# Patient Record
Sex: FEMALE | Race: WHITE | Employment: FULL TIME | URBAN - NONMETROPOLITAN AREA
[De-identification: names, ages, dates, MRNs, and addresses within clinical notes are randomized per-mention and may not be internally consistent; named-entity substitution may affect disease eponyms.]

---

## 2017-06-08 ENCOUNTER — TELEPHONE (OUTPATIENT)
Dept: INTERNAL MEDICINE | Age: 57
End: 2017-06-08

## 2017-06-08 DIAGNOSIS — Z12.31 SCREENING MAMMOGRAM, ENCOUNTER FOR: Primary | ICD-10-CM

## 2017-07-07 ENCOUNTER — HOSPITAL ENCOUNTER (OUTPATIENT)
Dept: WOMENS IMAGING | Age: 57
Discharge: HOME | End: 2017-07-07
Payer: COMMERCIAL

## 2017-07-07 DIAGNOSIS — Z12.31 SCREENING MAMMOGRAM, ENCOUNTER FOR: ICD-10-CM

## 2017-07-07 PROCEDURE — 77063 BREAST TOMOSYNTHESIS BI: CPT

## 2017-07-19 ENCOUNTER — HOSPITAL ENCOUNTER (OUTPATIENT)
Dept: WOMENS IMAGING | Age: 57
Discharge: HOME | End: 2017-07-19
Payer: COMMERCIAL

## 2017-07-19 DIAGNOSIS — R92.2 BREAST DENSITY: ICD-10-CM

## 2017-07-19 DIAGNOSIS — N64.89 BREAST ASYMMETRY: ICD-10-CM

## 2017-07-19 PROCEDURE — G0279 TOMOSYNTHESIS, MAMMO: HCPCS

## 2017-07-19 PROCEDURE — 76642 ULTRASOUND BREAST LIMITED: CPT

## 2017-07-20 ENCOUNTER — TELEPHONE (OUTPATIENT)
Dept: INTERNAL MEDICINE | Age: 57
End: 2017-07-20

## 2017-07-24 ENCOUNTER — TELEPHONE (OUTPATIENT)
Dept: INTERNAL MEDICINE | Age: 57
End: 2017-07-24

## 2017-07-24 DIAGNOSIS — R92.8 ABNORMAL MAMMOGRAM OF LEFT BREAST: Primary | ICD-10-CM

## 2017-07-25 ENCOUNTER — TELEPHONE (OUTPATIENT)
Dept: WOMENS IMAGING | Age: 57
End: 2017-07-25

## 2017-07-26 ENCOUNTER — OFFICE VISIT (OUTPATIENT)
Dept: SURGERY | Age: 57
End: 2017-07-26
Payer: COMMERCIAL

## 2017-07-26 VITALS
WEIGHT: 149 LBS | HEART RATE: 80 BPM | DIASTOLIC BLOOD PRESSURE: 72 MMHG | HEIGHT: 63 IN | SYSTOLIC BLOOD PRESSURE: 118 MMHG | BODY MASS INDEX: 26.4 KG/M2 | RESPIRATION RATE: 16 BRPM

## 2017-07-26 DIAGNOSIS — N63.0 BREAST MASS: Primary | ICD-10-CM

## 2017-07-26 PROCEDURE — 99202 OFFICE O/P NEW SF 15 MIN: CPT | Performed by: PHYSICIAN ASSISTANT

## 2017-07-26 RX ORDER — METFORMIN HYDROCHLORIDE 750 MG/1
750 TABLET, EXTENDED RELEASE ORAL
Refills: 2 | COMMUNITY
Start: 2017-05-04 | End: 2017-09-11 | Stop reason: SDUPTHER

## 2017-07-26 RX ORDER — ATORVASTATIN CALCIUM 40 MG/1
40 TABLET, FILM COATED ORAL DAILY
Refills: 2 | COMMUNITY
Start: 2017-05-04 | End: 2017-09-11 | Stop reason: SDUPTHER

## 2017-07-26 RX ORDER — IRBESARTAN 75 MG/1
TABLET ORAL DAILY
Refills: 2 | COMMUNITY
Start: 2017-05-04 | End: 2017-09-11 | Stop reason: SDUPTHER

## 2017-08-01 ENCOUNTER — HOSPITAL ENCOUNTER (OUTPATIENT)
Dept: WOMENS IMAGING | Age: 57
Discharge: HOME | End: 2017-08-01
Payer: COMMERCIAL

## 2017-08-01 DIAGNOSIS — N63.0 BREAST MASS: ICD-10-CM

## 2017-08-01 PROCEDURE — 19083 BX BREAST 1ST LESION US IMAG: CPT | Performed by: SURGERY

## 2017-08-01 PROCEDURE — G0206 DX MAMMO INCL CAD UNI: HCPCS

## 2017-08-01 PROCEDURE — 88305 TISSUE EXAM BY PATHOLOGIST: CPT

## 2017-08-01 PROCEDURE — 19083 BX BREAST 1ST LESION US IMAG: CPT

## 2017-08-04 ENCOUNTER — TELEPHONE (OUTPATIENT)
Dept: SURGERY | Age: 57
End: 2017-08-04

## 2017-08-08 ENCOUNTER — OFFICE VISIT (OUTPATIENT)
Dept: OBSTETRICS AND GYNECOLOGY | Facility: CLINIC | Age: 57
End: 2017-08-08

## 2017-08-08 VITALS
HEIGHT: 63 IN | SYSTOLIC BLOOD PRESSURE: 118 MMHG | BODY MASS INDEX: 26.05 KG/M2 | WEIGHT: 147 LBS | DIASTOLIC BLOOD PRESSURE: 82 MMHG

## 2017-08-08 DIAGNOSIS — Z01.419 WELL WOMAN EXAM WITH ROUTINE GYNECOLOGICAL EXAM: Primary | ICD-10-CM

## 2017-08-08 DIAGNOSIS — K42.9 UMBILICAL HERNIA WITHOUT OBSTRUCTION AND WITHOUT GANGRENE: ICD-10-CM

## 2017-08-08 PROCEDURE — G0123 SCREEN CERV/VAG THIN LAYER: HCPCS | Performed by: NURSE PRACTITIONER

## 2017-08-08 PROCEDURE — 99396 PREV VISIT EST AGE 40-64: CPT | Performed by: NURSE PRACTITIONER

## 2017-08-08 RX ORDER — METFORMIN HYDROCHLORIDE 750 MG/1
TABLET, EXTENDED RELEASE ORAL
Refills: 2 | COMMUNITY
Start: 2017-05-04

## 2017-08-08 RX ORDER — ATORVASTATIN CALCIUM 40 MG/1
TABLET, FILM COATED ORAL
Refills: 2 | COMMUNITY
Start: 2017-05-04

## 2017-08-08 RX ORDER — LIRAGLUTIDE 6 MG/ML
INJECTION SUBCUTANEOUS
Refills: 2 | COMMUNITY
Start: 2017-05-04

## 2017-08-08 RX ORDER — IRBESARTAN 75 MG/1
TABLET ORAL
Refills: 2 | COMMUNITY
Start: 2017-05-04

## 2017-08-08 NOTE — PROGRESS NOTES
Subjective   Carolina Pérez is a 57 y.o. female  YOB: 1960    Est PT is here today for yearly visit.  Pt states that she is doing good with no c/o  Pt does need order for Mammo today as well      Chief Complaint   Patient presents with   • Gynecologic Exam     Here for annual exam, pap smear.  LPS 16 WNL. Just had breast biopsy., negative. ([facility]).        HPI    The following portions of the patient's history were reviewed and updated as appropriate: allergies, current medications, past family history, past medical history, past social history, past surgical history and problem list.    No Known Allergies    Past Medical History:   Diagnosis Date   • Abnormal Pap smear of cervix    • Diabetes     Type 2   • Hyperlipidemia        Family History   Problem Relation Age of Onset   • No Known Problems Brother    • No Known Problems Sister    • Breast cancer Neg Hx    • Ovarian cancer Neg Hx    • Colon cancer Neg Hx        Social History     Social History   • Marital status:      Spouse name: N/A   • Number of children: N/A   • Years of education: N/A     Occupational History   • Not on file.     Social History Main Topics   • Smoking status: Never Smoker   • Smokeless tobacco: Never Used   • Alcohol use Yes      Comment: occ   • Drug use: No   • Sexual activity: Not on file     Other Topics Concern   • Not on file     Social History Narrative   • No narrative on file         Current Outpatient Prescriptions:   •  atorvastatin (LIPITOR) 40 MG tablet, , Disp: , Rfl: 2  •  irbesartan (AVAPRO) 75 MG tablet, , Disp: , Rfl: 2  •  metFORMIN ER (GLUCOPHAGE-XR) 750 MG 24 hr tablet, , Disp: , Rfl: 2  •  VICTOZA 18 MG/3ML solution pen-injector, , Disp: , Rfl: 2    Menstrual History:  OB History      Para Term  AB TAB SAB Ectopic Multiple Living    3 0 0 0 0 0 0 0 0 3           No LMP recorded. Patient is postmenopausal.    Sexual History:         Could not be calculated    Past Surgical  History:   Procedure Laterality Date   • BREAST BIOPSY Left     negative   • CHOLECYSTECTOMY     • WISDOM TOOTH EXTRACTION         Review of Systems   Constitutional: Negative for activity change, appetite change, fatigue and fever.   HENT: Negative for ear pain, hearing loss, sore throat and trouble swallowing.    Eyes: Negative for pain, discharge and visual disturbance.   Respiratory: Negative for apnea, cough, chest tightness and shortness of breath.    Cardiovascular: Negative for chest pain and palpitations.   Gastrointestinal: Negative for abdominal distention, abdominal pain, blood in stool, constipation, diarrhea, nausea and vomiting.   Endocrine: Negative for heat intolerance, polydipsia and polyuria.   Genitourinary: Negative for difficulty urinating, dyspareunia, dysuria, frequency, menstrual problem, pelvic pain, urgency, vaginal bleeding, vaginal discharge and vaginal pain.   Musculoskeletal: Negative for arthralgias, back pain, joint swelling and myalgias.   Skin: Negative for rash and wound.   Allergic/Immunologic: Negative for environmental allergies and immunocompromised state.   Neurological: Negative for dizziness, tremors, seizures, numbness and headaches.   Hematological: Negative for adenopathy. Does not bruise/bleed easily.   Psychiatric/Behavioral: Negative for agitation, confusion and sleep disturbance. The patient is not nervous/anxious.        Objective   Physical Exam   Constitutional: She is oriented to person, place, and time. She appears well-developed and well-nourished. No distress.   HENT:   Head: Normocephalic.   Right Ear: External ear normal.   Left Ear: External ear normal.   Eyes: Conjunctivae are normal. Right eye exhibits no discharge. Left eye exhibits no discharge. No scleral icterus.   Neck: Normal range of motion. Neck supple. Carotid bruit is not present. No tracheal deviation present. No thyromegaly present.   Cardiovascular: Normal rate, regular rhythm, normal heart  sounds and intact distal pulses.    No murmur heard.  Pulmonary/Chest: Effort normal and breath sounds normal. No respiratory distress. She has no wheezes. Right breast exhibits no inverted nipple, no mass, no nipple discharge, no skin change and no tenderness. Left breast exhibits no inverted nipple, no mass, no nipple discharge, no skin change and no tenderness. Breasts are symmetrical. There is no breast swelling.       Site of recent biopsy   Abdominal: Soft. She exhibits no distension and no mass. There is no tenderness. There is no guarding. No hernia. Hernia confirmed negative in the right inguinal area and confirmed negative in the left inguinal area.   Genitourinary: Rectum normal, vagina normal and uterus normal. Rectal exam shows no mass. No breast tenderness, discharge or bleeding. Pelvic exam was performed with patient supine. There is no rash, tenderness, lesion or injury on the right labia. There is no rash, tenderness, lesion or injury on the left labia. Uterus is not enlarged, not fixed and not tender. Cervix exhibits no motion tenderness, no discharge and no friability. Right adnexum displays no mass, no tenderness and no fullness. Left adnexum displays no mass, no tenderness and no fullness. No erythema, tenderness or bleeding in the vagina. No foreign body in the vagina. No signs of injury around the vagina. No vaginal discharge found.   Genitourinary Comments:   BSU normal  Urethral meatus  Normal  Perineum  Normal   Musculoskeletal: Normal range of motion. She exhibits no edema or tenderness.   Lymphadenopathy:        Head (right side): No submental, no submandibular, no tonsillar, no preauricular, no posterior auricular and no occipital adenopathy present.        Head (left side): No submental, no submandibular, no tonsillar, no preauricular, no posterior auricular and no occipital adenopathy present.     She has no cervical adenopathy.        Right cervical: No superficial cervical, no deep  "cervical and no posterior cervical adenopathy present.       Left cervical: No superficial cervical, no deep cervical and no posterior cervical adenopathy present.     She has no axillary adenopathy.        Right: No inguinal adenopathy present.        Left: No inguinal adenopathy present.   Neurological: She is alert and oriented to person, place, and time. Coordination normal.   Skin: Skin is warm and dry. No bruising and no rash noted. She is not diaphoretic. No erythema.   Psychiatric: She has a normal mood and affect. Her behavior is normal. Judgment and thought content normal.   Nursing note and vitals reviewed.        Vitals:    08/08/17 1001   BP: 118/82   BP Location: Left arm   Patient Position: Sitting   Cuff Size: Adult   Weight: 147 lb (66.7 kg)   Height: 63\" (160 cm)       Carolina was seen today for gynecologic exam.    Diagnoses and all orders for this visit:    Well woman exam with routine gynecological exam  Comments:  Normal well woman exam.  Thin prep done.    Orders:  -     Liquid-based Pap Smear, Screening    Umbilical hernia without obstruction and without gangrene  Comments:  Patient has an umbilical hernia that she has never had evaluated.  Referral made to gastroenterology.   Orders:  -     Ambulatory Referral to Gastroenterology        Body mass index is 26.04 kg/(m^2).     Non-Smoker    MyChart Instructions Given       "

## 2017-08-09 LAB
GEN CATEG CVX/VAG CYTO-IMP: NORMAL
LAB AP CASE REPORT: NORMAL
LAB AP GYN ADDITIONAL INFORMATION: NORMAL
Lab: NORMAL
PATH INTERP SPEC-IMP: NORMAL
STAT OF ADQ CVX/VAG CYTO-IMP: NORMAL

## 2017-08-14 ENCOUNTER — OFFICE VISIT (OUTPATIENT)
Dept: SURGERY | Age: 57
End: 2017-08-14
Payer: COMMERCIAL

## 2017-08-14 VITALS — SYSTOLIC BLOOD PRESSURE: 120 MMHG | DIASTOLIC BLOOD PRESSURE: 60 MMHG | HEART RATE: 80 BPM

## 2017-08-14 DIAGNOSIS — N60.12 FIBROCYSTIC CHANGES OF LEFT BREAST: Primary | ICD-10-CM

## 2017-08-14 PROCEDURE — 99212 OFFICE O/P EST SF 10 MIN: CPT | Performed by: PHYSICIAN ASSISTANT

## 2017-08-23 ENCOUNTER — OFFICE VISIT (OUTPATIENT)
Dept: GASTROENTEROLOGY | Facility: CLINIC | Age: 57
End: 2017-08-23

## 2017-08-23 VITALS
WEIGHT: 142 LBS | BODY MASS INDEX: 25.16 KG/M2 | HEART RATE: 76 BPM | TEMPERATURE: 98.2 F | SYSTOLIC BLOOD PRESSURE: 120 MMHG | DIASTOLIC BLOOD PRESSURE: 80 MMHG | HEIGHT: 63 IN

## 2017-08-23 DIAGNOSIS — R19.7 DIARRHEA, UNSPECIFIED TYPE: Primary | ICD-10-CM

## 2017-08-23 DIAGNOSIS — R10.30 LOWER ABDOMINAL PAIN: ICD-10-CM

## 2017-08-23 PROCEDURE — 99213 OFFICE O/P EST LOW 20 MIN: CPT | Performed by: NURSE PRACTITIONER

## 2017-08-23 RX ORDER — CHLORAL HYDRATE 500 MG
CAPSULE ORAL
COMMUNITY

## 2017-08-23 RX ORDER — PHENOL 1.4 %
600 AEROSOL, SPRAY (ML) MUCOUS MEMBRANE DAILY
COMMUNITY

## 2017-08-23 RX ORDER — MONTELUKAST SODIUM 4 MG/1
1 TABLET, CHEWABLE ORAL 3 TIMES DAILY
Qty: 90 TABLET | Refills: 1 | Status: SHIPPED | OUTPATIENT
Start: 2017-08-23 | End: 2017-09-05 | Stop reason: SDUPTHER

## 2017-08-23 NOTE — PROGRESS NOTES
"Chief Complaint   Patient presents with   • Diarrhea     had diarrhea bad last week not been eating anything much was told she has a hernia per dr. becker       Subjective     HPI    Pt states she was referred to our office by PCP to have \"hernia evaluated.\"  Pt currently denies heartburn or indigestion.  No dysphagia or odynophagia.  She has experienced sudden onset of diarrhea over past 2 weeks.  Stools alternate between watery and loose.  Diarrhea occurs whether or not she eats.  She has woke up a few times during night to pass stool.  Associated lower abdominal cramping.  Cramping is relieved after BM.  Cramping only occurs with diarrhea.  Denies use of NSAIDs.  No N/V.      CScope (Dr Chavez) 8/2016-normal (hx of tubular adenoma polyp in 2013)    Past Medical History:   Diagnosis Date   • Abnormal Pap smear of cervix    • Diabetes     Type 2   • Hyperlipidemia        Past Surgical History:   Procedure Laterality Date   • BREAST BIOPSY Left     negative   • CHOLECYSTECTOMY     • WISDOM TOOTH EXTRACTION         Outpatient Prescriptions Marked as Taking for the 8/23/17 encounter (Office Visit) with NOREEN Shabazz   Medication Sig Dispense Refill   • atorvastatin (LIPITOR) 40 MG tablet   2   • calcium carbonate (OS-NAHED) 600 MG tablet Take 600 mg by mouth Daily.     • Flaxseed, Linseed, (FLAX SEEDS PO) Take  by mouth.     • irbesartan (AVAPRO) 75 MG tablet   2   • metFORMIN ER (GLUCOPHAGE-XR) 750 MG 24 hr tablet   2   • Omega-3 Fatty Acids (FISH OIL) 1000 MG capsule capsule Take  by mouth Daily With Breakfast.     • VICTOZA 18 MG/3ML solution pen-injector   2       No Known Allergies    Social History     Social History   • Marital status:      Spouse name: N/A   • Number of children: N/A   • Years of education: N/A     Occupational History   • Not on file.     Social History Main Topics   • Smoking status: Never Smoker   • Smokeless tobacco: Never Used   • Alcohol use Yes      Comment: occ   • Drug " "use: No   • Sexual activity: Not on file     Other Topics Concern   • Not on file     Social History Narrative       Family History   Problem Relation Age of Onset   • Prostate cancer Father    • No Known Problems Brother    • No Known Problems Sister    • Breast cancer Neg Hx    • Ovarian cancer Neg Hx    • Colon cancer Neg Hx        Review of Systems   Constitutional: Negative for fatigue, fever and unexpected weight change.   HENT: Negative for hearing loss, sore throat and voice change.    Eyes: Negative for visual disturbance.   Respiratory: Negative for cough, shortness of breath and wheezing.    Cardiovascular: Negative for chest pain and palpitations.   Gastrointestinal: Negative for abdominal pain, blood in stool and vomiting.   Endocrine: Negative for polydipsia and polyuria.   Genitourinary: Negative for difficulty urinating, dysuria, hematuria and urgency.   Musculoskeletal: Negative for joint swelling and myalgias.   Skin: Negative for color change, rash and wound.   Neurological: Negative for dizziness, tremors, seizures and syncope.   Hematological: Does not bruise/bleed easily.   Psychiatric/Behavioral: Negative for agitation and confusion. The patient is not nervous/anxious.        Objective     Vitals:    08/23/17 0924   BP: 120/80   Pulse: 76   Temp: 98.2 °F (36.8 °C)   Weight: 142 lb (64.4 kg)   Height: 63\" (160 cm)     Body mass index is 25.15 kg/(m^2).    Physical Exam   Constitutional: She is oriented to person, place, and time. She appears well-developed and well-nourished.   HENT:   Head: Normocephalic and atraumatic.   Eyes: Conjunctivae are normal. Pupils are equal, round, and reactive to light. No scleral icterus.   Neck: No JVD present. No thyroid mass and no thyromegaly present.   Cardiovascular: Normal rate, regular rhythm and normal heart sounds.  Exam reveals no gallop and no friction rub.    No murmur heard.  Pulmonary/Chest: Effort normal and breath sounds normal. No accessory " muscle usage. No respiratory distress. She has no wheezes. She has no rales.   Abdominal: Soft. Bowel sounds are normal. She exhibits no distension, no ascites and no mass. There is no splenomegaly or hepatomegaly. There is no tenderness. There is no rebound and no guarding.   Genitourinary:   Genitourinary Comments: Rectal-Did not examine   Musculoskeletal: Normal range of motion. She exhibits no edema.   Neurological: She is alert and oriented to person, place, and time.   Deemed a reliable historian, able to converse without difficulty and able to move all extremities without difficulty   Skin: Skin is warm and dry.   Psychiatric: She has a normal mood and affect. Her behavior is normal.       Imaging Results (most recent)     None          Assessment/Plan     Carolina was seen today for diarrhea.    Diagnoses and all orders for this visit:    Diarrhea, unspecified type  -     Gastrointestinal Panel, PCR  -     Occult Blood X 1, Stool  -     colestipol (COLESTID) 1 g tablet; Take 1 tablet by mouth 3 (Three) Times a Day.    Lower abdominal pain    I will request records from PCP as pt is not currently exhibiting symptoms to warrant EGD.  I will discuss case further with Dr Chavez.  Instructed pt to take colestid 2x daily, 30 min prior to meal.  She may increase to three pills per day if sx persist.  * Surgery not found *    There are no Patient Instructions on file for this visit.

## 2017-08-24 ENCOUNTER — APPOINTMENT (OUTPATIENT)
Dept: LAB | Facility: HOSPITAL | Age: 57
End: 2017-08-24

## 2017-08-24 LAB — HEMOCCULT STL QL: NEGATIVE

## 2017-08-24 PROCEDURE — 82272 OCCULT BLD FECES 1-3 TESTS: CPT | Performed by: NURSE PRACTITIONER

## 2017-08-28 ENCOUNTER — TELEPHONE (OUTPATIENT)
Dept: GASTROENTEROLOGY | Facility: CLINIC | Age: 57
End: 2017-08-28

## 2017-08-28 PROCEDURE — 87077 CULTURE AEROBIC IDENTIFY: CPT | Performed by: NURSE PRACTITIONER

## 2017-08-28 PROCEDURE — 87081 CULTURE SCREEN ONLY: CPT | Performed by: NURSE PRACTITIONER

## 2017-08-28 PROCEDURE — 87507 IADNA-DNA/RNA PROBE TQ 12-25: CPT | Performed by: NURSE PRACTITIONER

## 2017-08-28 NOTE — TELEPHONE ENCOUNTER
"Please let her know stool was neg for blood, GI panel is still pending as she has not brought in sample for that.      I spoke to lab and they were not able to run both test off same sample due to \"solution\" added to GI panel container.  I still have not received records from her PCP.      At this time I do not have a reason to perform an EGD.  If her diarrhea persist and GI panel is neg a colonoscopy could be considered.  I did review case with Dr Chavez and he was in agreement.    ty  "

## 2017-08-29 ENCOUNTER — TELEPHONE (OUTPATIENT)
Dept: GASTROENTEROLOGY | Facility: CLINIC | Age: 57
End: 2017-08-29

## 2017-08-29 ENCOUNTER — APPOINTMENT (OUTPATIENT)
Dept: LAB | Facility: HOSPITAL | Age: 57
End: 2017-08-29

## 2017-08-29 ENCOUNTER — TRANSCRIBE ORDERS (OUTPATIENT)
Dept: ADMINISTRATIVE | Facility: HOSPITAL | Age: 57
End: 2017-08-29

## 2017-08-29 DIAGNOSIS — R19.7 DIARRHEA, UNSPECIFIED TYPE: Primary | ICD-10-CM

## 2017-08-29 LAB
ADV 40+41 DNA STL QL NAA+NON-PROBE: NOT DETECTED
ASTRO TYP 1-8 RNA STL QL NAA+NON-PROBE: NOT DETECTED
C CAYETANENSIS DNA STL QL NAA+NON-PROBE: NOT DETECTED
C DIFF TOX GENS STL QL NAA+PROBE: NOT DETECTED
CAMPY SP DNA.DIARRHEA STL QL NAA+PROBE: NOT DETECTED
CRYPTOSP STL CULT: NOT DETECTED
E COLI DNA SPEC QL NAA+PROBE: NOT DETECTED
E HISTOLYT AG STL-ACNC: NOT DETECTED
EAEC PAA PLAS AGGR+AATA ST NAA+NON-PRB: NOT DETECTED
EC STX1+STX2 GENES STL QL NAA+NON-PROBE: NOT DETECTED
EPEC EAE GENE STL QL NAA+NON-PROBE: NOT DETECTED
ETEC LTA+ST1A+ST1B TOX ST NAA+NON-PROBE: NOT DETECTED
G LAMBLIA DNA SPEC QL NAA+PROBE: NOT DETECTED
NOROVIRUS GI+II RNA STL QL NAA+NON-PROBE: NOT DETECTED
P SHIGELLOIDES DNA STL QL NAA+NON-PROBE: NOT DETECTED
RV RNA STL NAA+PROBE: NOT DETECTED
SALMONELLA DNA SPEC QL NAA+PROBE: DETECTED
SAPO I+II+IV+V RNA STL QL NAA+NON-PROBE: NOT DETECTED
SHIGELLA SP+EIEC IPAH ST NAA+NON-PROBE: NOT DETECTED
V CHOLERAE DNA SPEC QL NAA+PROBE: NOT DETECTED
VIBRIO DNA SPEC NAA+PROBE: NOT DETECTED
YERSINIA STL CULT: NOT DETECTED

## 2017-08-29 RX ORDER — CIPROFLOXACIN 500 MG/1
500 TABLET, FILM COATED ORAL 2 TIMES DAILY
Qty: 20 TABLET | Refills: 0 | Status: SHIPPED | OUTPATIENT
Start: 2017-08-29 | End: 2017-08-29 | Stop reason: SDUPTHER

## 2017-08-29 RX ORDER — CIPROFLOXACIN 500 MG/1
500 TABLET, FILM COATED ORAL 2 TIMES DAILY
Qty: 14 TABLET | Refills: 0 | Status: SHIPPED | OUTPATIENT
Start: 2017-08-29 | End: 2017-09-05

## 2017-08-29 NOTE — TELEPHONE ENCOUNTER
Cee from [facility] called and stated that they have the results of the gastrointestinal panel and this shoed she had salmonella

## 2017-08-29 NOTE — TELEPHONE ENCOUNTER
Pt notified of results.    She continues to experience at least 5 episodes of diarrhea daily.  No blood or melena stools  Antibiotics called to pharmacy,   Reviewed results/medications with dr plummer

## 2017-09-05 ENCOUNTER — TELEPHONE (OUTPATIENT)
Dept: GASTROENTEROLOGY | Facility: CLINIC | Age: 57
End: 2017-09-05

## 2017-09-05 DIAGNOSIS — R19.7 DIARRHEA, UNSPECIFIED TYPE: ICD-10-CM

## 2017-09-05 RX ORDER — MONTELUKAST SODIUM 4 MG/1
1 TABLET, CHEWABLE ORAL 3 TIMES DAILY
Qty: 90 TABLET | Refills: 1 | Status: SHIPPED | OUTPATIENT
Start: 2017-09-05 | End: 2018-08-09

## 2017-09-05 NOTE — TELEPHONE ENCOUNTER
Pt called and stated that she has finished her last ciprofloxacin and she does not feel any better she stated she id still having diarrhea 5 to 6 times a day and at night she can not control it that she is going in her sleep she also stated that her  has the same antibiotic and if you wanted her to take more of the same medication that she did not need anything called in but she stated this med did not seem to work

## 2017-09-05 NOTE — TELEPHONE ENCOUNTER
Spoke with pt  She will take Colestid and call in one week if not improved  She has been on lactose free diet

## 2017-09-05 NOTE — TELEPHONE ENCOUNTER
Do I need to schedule for Cscope (neg procedure 2016)  GI panel pos for salmonella    Further direction, please    ad

## 2017-09-06 RX ORDER — ASCORBIC ACID 500 MG
500 TABLET ORAL DAILY
COMMUNITY

## 2017-09-06 RX ORDER — PERPHENAZINE/AMITRIPTYLINE HCL 4MG-10MG
TABLET ORAL DAILY
COMMUNITY
End: 2017-09-11

## 2017-09-06 RX ORDER — FOLIC ACID 1 MG/1
1 TABLET ORAL DAILY
COMMUNITY

## 2017-09-06 RX ORDER — CHLORAL HYDRATE 500 MG
3000 CAPSULE ORAL DAILY
COMMUNITY
End: 2020-01-08

## 2017-09-06 RX ORDER — ECHINACEA 400 MG
CAPSULE ORAL
COMMUNITY
End: 2017-09-11

## 2017-09-08 DIAGNOSIS — E11.9 TYPE 2 DIABETES MELLITUS WITHOUT COMPLICATION, UNSPECIFIED LONG TERM INSULIN USE STATUS: ICD-10-CM

## 2017-09-08 DIAGNOSIS — E78.2 MIXED HYPERLIPIDEMIA: ICD-10-CM

## 2017-09-08 DIAGNOSIS — I10 ESSENTIAL HYPERTENSION: Primary | ICD-10-CM

## 2017-09-11 ENCOUNTER — OFFICE VISIT (OUTPATIENT)
Dept: INTERNAL MEDICINE | Age: 57
End: 2017-09-11
Payer: COMMERCIAL

## 2017-09-11 VITALS
BODY MASS INDEX: 25.52 KG/M2 | OXYGEN SATURATION: 98 % | SYSTOLIC BLOOD PRESSURE: 118 MMHG | HEIGHT: 63 IN | WEIGHT: 144 LBS | DIASTOLIC BLOOD PRESSURE: 64 MMHG | HEART RATE: 86 BPM

## 2017-09-11 DIAGNOSIS — K43.9 VENTRAL HERNIA WITHOUT OBSTRUCTION OR GANGRENE: ICD-10-CM

## 2017-09-11 DIAGNOSIS — E11.9 TYPE 2 DIABETES MELLITUS WITHOUT COMPLICATION, WITHOUT LONG-TERM CURRENT USE OF INSULIN (HCC): ICD-10-CM

## 2017-09-11 DIAGNOSIS — E78.2 MIXED HYPERLIPIDEMIA: ICD-10-CM

## 2017-09-11 DIAGNOSIS — E11.9 TYPE 2 DIABETES MELLITUS WITHOUT COMPLICATION, UNSPECIFIED LONG TERM INSULIN USE STATUS: ICD-10-CM

## 2017-09-11 DIAGNOSIS — I10 ESSENTIAL HYPERTENSION: ICD-10-CM

## 2017-09-11 DIAGNOSIS — I10 ESSENTIAL HYPERTENSION: Primary | ICD-10-CM

## 2017-09-11 LAB
ALBUMIN SERPL-MCNC: 4.6 G/DL (ref 3.5–5.2)
ALP BLD-CCNC: 97 U/L (ref 35–104)
ALT SERPL-CCNC: 39 U/L (ref 5–33)
ANION GAP SERPL CALCULATED.3IONS-SCNC: 17 MMOL/L (ref 7–19)
AST SERPL-CCNC: 26 U/L (ref 5–32)
BACTERIA SPEC AEROBE CULT: ABNORMAL
BILIRUB SERPL-MCNC: 1.2 MG/DL (ref 0.2–1.2)
BUN BLDV-MCNC: 9 MG/DL (ref 6–20)
CALCIUM SERPL-MCNC: 10.4 MG/DL (ref 8.6–10)
CHLORIDE BLD-SCNC: 102 MMOL/L (ref 98–111)
CO2: 27 MMOL/L (ref 22–29)
CREAT SERPL-MCNC: 0.4 MG/DL (ref 0.5–0.9)
GFR NON-AFRICAN AMERICAN: >60
GLUCOSE BLD-MCNC: 112 MG/DL (ref 74–109)
HBA1C MFR BLD: 6.2 %
LDL CHOLESTEROL DIRECT: 64 MG/DL
POTASSIUM SERPL-SCNC: 4 MMOL/L (ref 3.5–5)
SODIUM BLD-SCNC: 146 MMOL/L (ref 136–145)
TOTAL PROTEIN: 7.9 G/DL (ref 6.6–8.7)

## 2017-09-11 PROCEDURE — 99214 OFFICE O/P EST MOD 30 MIN: CPT | Performed by: INTERNAL MEDICINE

## 2017-09-11 RX ORDER — METFORMIN HYDROCHLORIDE 750 MG/1
750 TABLET, EXTENDED RELEASE ORAL
Qty: 90 TABLET | Refills: 3 | Status: SHIPPED | OUTPATIENT
Start: 2017-09-11 | End: 2018-03-14 | Stop reason: SDUPTHER

## 2017-09-11 RX ORDER — IRBESARTAN 75 MG/1
TABLET ORAL
Qty: 45 TABLET | Refills: 3 | Status: SHIPPED | OUTPATIENT
Start: 2017-09-11 | End: 2017-09-13 | Stop reason: SDUPTHER

## 2017-09-11 RX ORDER — MONTELUKAST SODIUM 4 MG/1
1 TABLET, CHEWABLE ORAL 3 TIMES DAILY
Refills: 0 | COMMUNITY
Start: 2017-09-05 | End: 2018-03-14

## 2017-09-11 RX ORDER — ATORVASTATIN CALCIUM 40 MG/1
40 TABLET, FILM COATED ORAL DAILY
Qty: 90 TABLET | Refills: 3 | Status: SHIPPED | OUTPATIENT
Start: 2017-09-11 | End: 2018-03-14 | Stop reason: SDUPTHER

## 2017-09-11 ASSESSMENT — PATIENT HEALTH QUESTIONNAIRE - PHQ9
2. FEELING DOWN, DEPRESSED OR HOPELESS: 0
SUM OF ALL RESPONSES TO PHQ QUESTIONS 1-9: 0
SUM OF ALL RESPONSES TO PHQ9 QUESTIONS 1 & 2: 0
1. LITTLE INTEREST OR PLEASURE IN DOING THINGS: 0

## 2017-09-11 ASSESSMENT — ENCOUNTER SYMPTOMS
TROUBLE SWALLOWING: 0
SHORTNESS OF BREATH: 0
ABDOMINAL PAIN: 0
COUGH: 0
RHINORRHEA: 0
SORE THROAT: 0
NAUSEA: 0

## 2017-09-13 DIAGNOSIS — E78.2 MIXED HYPERLIPIDEMIA: ICD-10-CM

## 2017-09-13 DIAGNOSIS — K43.9 VENTRAL HERNIA WITHOUT OBSTRUCTION OR GANGRENE: ICD-10-CM

## 2017-09-13 DIAGNOSIS — I10 ESSENTIAL HYPERTENSION: ICD-10-CM

## 2017-09-13 DIAGNOSIS — E11.9 TYPE 2 DIABETES MELLITUS WITHOUT COMPLICATION, WITHOUT LONG-TERM CURRENT USE OF INSULIN (HCC): ICD-10-CM

## 2017-09-13 RX ORDER — IRBESARTAN 75 MG/1
TABLET ORAL
Qty: 45 TABLET | Refills: 3 | Status: SHIPPED | OUTPATIENT
Start: 2017-09-13 | End: 2018-03-14 | Stop reason: SDUPTHER

## 2017-09-18 DIAGNOSIS — K43.9 VENTRAL HERNIA WITHOUT OBSTRUCTION OR GANGRENE: Primary | ICD-10-CM

## 2017-10-12 RX ORDER — PEN NEEDLE, DIABETIC 32GX 5/32"
NEEDLE, DISPOSABLE MISCELLANEOUS
Qty: 100 EACH | Refills: 5 | Status: SHIPPED | OUTPATIENT
Start: 2017-10-12 | End: 2019-02-13 | Stop reason: SDUPTHER

## 2017-10-13 ENCOUNTER — TELEPHONE (OUTPATIENT)
Dept: INTERNAL MEDICINE | Age: 57
End: 2017-10-13

## 2017-10-13 RX ORDER — AMOXICILLIN AND CLAVULANATE POTASSIUM 875; 125 MG/1; MG/1
1 TABLET, FILM COATED ORAL 2 TIMES DAILY
Qty: 14 TABLET | Refills: 0 | Status: SHIPPED | OUTPATIENT
Start: 2017-10-13 | End: 2017-10-20

## 2017-10-13 NOTE — TELEPHONE ENCOUNTER
Pt called saying they have a sore throat and coughing wants an antibiotic called in for them.  Please call at [phone]

## 2018-01-03 ENCOUNTER — TELEPHONE (OUTPATIENT)
Dept: SURGERY | Age: 58
End: 2018-01-03

## 2018-01-03 NOTE — TELEPHONE ENCOUNTER
Called patient to give her the appt information for her mammogram and follow up appt with Felisa Ospina. She was driving and asked if I would just give her the dates and then leave the appt information on her vm. When I gave her the dates she said she was working on that day so I gave her the number to call to reschedule appts.

## 2018-01-10 ENCOUNTER — HOSPITAL ENCOUNTER (OUTPATIENT)
Dept: WOMENS IMAGING | Age: 58
Discharge: HOME | End: 2018-01-10
Payer: COMMERCIAL

## 2018-01-10 DIAGNOSIS — N60.12 FIBROCYSTIC CHANGES OF LEFT BREAST: ICD-10-CM

## 2018-01-10 PROCEDURE — 77065 DX MAMMO INCL CAD UNI: CPT

## 2018-01-19 ENCOUNTER — TELEPHONE (OUTPATIENT)
Dept: SURGERY | Age: 58
End: 2018-01-19

## 2018-03-14 ENCOUNTER — OFFICE VISIT (OUTPATIENT)
Dept: INTERNAL MEDICINE | Age: 58
End: 2018-03-14
Payer: COMMERCIAL

## 2018-03-14 VITALS
SYSTOLIC BLOOD PRESSURE: 124 MMHG | DIASTOLIC BLOOD PRESSURE: 74 MMHG | OXYGEN SATURATION: 97 % | HEIGHT: 63 IN | WEIGHT: 146 LBS | BODY MASS INDEX: 25.87 KG/M2 | HEART RATE: 70 BPM

## 2018-03-14 DIAGNOSIS — I65.22 ATHEROSCLEROSIS OF LEFT CAROTID ARTERY: ICD-10-CM

## 2018-03-14 DIAGNOSIS — I10 ESSENTIAL HYPERTENSION: ICD-10-CM

## 2018-03-14 DIAGNOSIS — K43.9 VENTRAL HERNIA WITHOUT OBSTRUCTION OR GANGRENE: ICD-10-CM

## 2018-03-14 DIAGNOSIS — E78.2 MIXED HYPERLIPIDEMIA: ICD-10-CM

## 2018-03-14 DIAGNOSIS — Z98.890 HISTORY OF BREAST BIOPSY: ICD-10-CM

## 2018-03-14 DIAGNOSIS — E11.9 TYPE 2 DIABETES MELLITUS WITHOUT COMPLICATION, WITHOUT LONG-TERM CURRENT USE OF INSULIN (HCC): ICD-10-CM

## 2018-03-14 LAB
ALBUMIN SERPL-MCNC: 4.4 G/DL (ref 3.5–5.2)
ALP BLD-CCNC: 130 U/L (ref 35–104)
ALT SERPL-CCNC: 23 U/L (ref 5–33)
ANION GAP SERPL CALCULATED.3IONS-SCNC: 16 MMOL/L (ref 7–19)
AST SERPL-CCNC: 20 U/L (ref 5–32)
BILIRUB SERPL-MCNC: 0.9 MG/DL (ref 0.2–1.2)
BUN BLDV-MCNC: 11 MG/DL (ref 8–23)
CALCIUM SERPL-MCNC: 9.9 MG/DL (ref 8.8–10.2)
CHLORIDE BLD-SCNC: 100 MMOL/L (ref 98–111)
CHOLESTEROL, TOTAL: 158 MG/DL (ref 160–199)
CO2: 26 MMOL/L (ref 22–29)
CREAT SERPL-MCNC: <0.5 MG/DL (ref 0.5–0.9)
CREATININE URINE: 26.6 MG/DL (ref 4.2–622)
GFR NON-AFRICAN AMERICAN: >60
GLUCOSE BLD-MCNC: 142 MG/DL (ref 74–109)
HBA1C MFR BLD: 7.2 %
HCT VFR BLD CALC: 42.4 % (ref 37–47)
HDLC SERPL-MCNC: 49 MG/DL (ref 65–121)
HEMOGLOBIN: 14.1 G/DL (ref 12–16)
LDL CHOLESTEROL CALCULATED: 87 MG/DL
MCH RBC QN AUTO: 30.8 PG (ref 27–31)
MCHC RBC AUTO-ENTMCNC: 33.3 G/DL (ref 33–37)
MCV RBC AUTO: 92.6 FL (ref 81–99)
MICROALBUMIN UR-MCNC: <1.2 MG/DL (ref 0–19)
MICROALBUMIN/CREAT UR-RTO: NORMAL MG/G
PDW BLD-RTO: 12.7 % (ref 11.5–14.5)
PLATELET # BLD: 295 K/UL (ref 130–400)
PMV BLD AUTO: 9.6 FL (ref 9.4–12.3)
POTASSIUM SERPL-SCNC: 4.2 MMOL/L (ref 3.5–5)
RBC # BLD: 4.58 M/UL (ref 4.2–5.4)
SODIUM BLD-SCNC: 142 MMOL/L (ref 136–145)
TOTAL PROTEIN: 7.2 G/DL (ref 6.6–8.7)
TRIGL SERPL-MCNC: 112 MG/DL (ref 0–149)
TSH SERPL DL<=0.05 MIU/L-ACNC: 1.14 UIU/ML (ref 0.27–4.2)
WBC # BLD: 7.3 K/UL (ref 4.8–10.8)

## 2018-03-14 PROCEDURE — 99214 OFFICE O/P EST MOD 30 MIN: CPT | Performed by: INTERNAL MEDICINE

## 2018-03-14 RX ORDER — IRBESARTAN 75 MG/1
TABLET ORAL
Qty: 45 TABLET | Refills: 3 | Status: SHIPPED | OUTPATIENT
Start: 2018-03-14 | End: 2018-08-07 | Stop reason: SDUPTHER

## 2018-03-14 RX ORDER — ATORVASTATIN CALCIUM 40 MG/1
40 TABLET, FILM COATED ORAL DAILY
Qty: 90 TABLET | Refills: 3 | Status: SHIPPED | OUTPATIENT
Start: 2018-03-14 | End: 2018-08-07 | Stop reason: SDUPTHER

## 2018-03-14 RX ORDER — METFORMIN HYDROCHLORIDE 750 MG/1
750 TABLET, EXTENDED RELEASE ORAL
Qty: 90 TABLET | Refills: 3 | Status: SHIPPED | OUTPATIENT
Start: 2018-03-14 | End: 2018-08-07 | Stop reason: SDUPTHER

## 2018-03-14 ASSESSMENT — ENCOUNTER SYMPTOMS
NAUSEA: 0
COUGH: 0
SHORTNESS OF BREATH: 0
ABDOMINAL PAIN: 0
TROUBLE SWALLOWING: 0
RHINORRHEA: 0
SORE THROAT: 0

## 2018-03-14 NOTE — PROGRESS NOTES
[facility] Internal Medicine    Chief Complaint   Patient presents with    Other     Pt is here for 5 month follow up of HTN and DM and review of recent labs. Pt denies any c/o. HPI:Gel returns reassessment of several problems, including carotid artery disease, hypertension, osteopenia, type II diabetes. She is doing very well on a regimen of metformin and Victoza, tolerating this, with excellent glycemic control. Blood pressures in a good range, she has no current vaginal symptoms such as chest pain, numbness, unilateral symptoms. Her blood pressure is nicely controlled    Dr. fregoso in [facility] examines her eyes      Past Medical History:   Diagnosis Date    Abdominal hernia     Carotid artery plaque      <50% rt;  50-69% left    Essential hypertension     H/O abnormal cervical Papanicolaou smear     Mixed hyperlipidemia     Osteopenia     Simple goiter     Type 2 diabetes mellitus without complication (HCC)     Umbilical hernia       Family History   Problem Relation Age of Onset    Diabetes Father     Heart Disease Father     High Blood Pressure Father     Cancer Father      Pancreatic cancer-  at 80    Coronary Art Dis Father     Coronary Art Dis Mother       Social History     Social History    Marital status:      Spouse name: N/A    Number of children: N/A    Years of education: N/A     Occupational History    Not on file.      Social History Main Topics    Smoking status: Never Smoker    Smokeless tobacco: Never Used    Alcohol use No    Drug use: No    Sexual activity: Not on file     Other Topics Concern    Not on file     Social History Narrative    No narrative on file      No Known Allergies   Current Outpatient Prescriptions   Medication Sig Dispense Refill    irbesartan (AVAPRO) 75 MG tablet 1/2 tablet daily 45 tablet 3    atorvastatin (LIPITOR) 40 MG tablet Take 1 tablet by mouth daily 90 tablet 3    Liraglutide (VICTOZA) 18 MG/3ML [facility]

## 2018-06-22 ENCOUNTER — TELEPHONE (OUTPATIENT)
Dept: SURGERY | Age: 58
End: 2018-06-22

## 2018-07-18 ENCOUNTER — HOSPITAL ENCOUNTER (OUTPATIENT)
Dept: WOMENS IMAGING | Age: 58
Discharge: HOME | End: 2018-07-18
Payer: COMMERCIAL

## 2018-07-18 DIAGNOSIS — Z12.31 ENCOUNTER FOR SCREENING MAMMOGRAM FOR MALIGNANT NEOPLASM OF BREAST: ICD-10-CM

## 2018-07-18 PROCEDURE — 77063 BREAST TOMOSYNTHESIS BI: CPT

## 2018-07-20 ENCOUNTER — TELEPHONE (OUTPATIENT)
Dept: WOMENS IMAGING | Age: 58
End: 2018-07-20

## 2018-07-24 ENCOUNTER — HOSPITAL ENCOUNTER (OUTPATIENT)
Dept: WOMENS IMAGING | Age: 58
Discharge: HOME | End: 2018-07-24
Payer: COMMERCIAL

## 2018-07-24 DIAGNOSIS — N63.0 BREAST MASS: Primary | ICD-10-CM

## 2018-07-24 DIAGNOSIS — N64.89 BREAST ASYMMETRY: ICD-10-CM

## 2018-07-24 PROCEDURE — G0279 TOMOSYNTHESIS, MAMMO: HCPCS

## 2018-07-24 PROCEDURE — 76642 ULTRASOUND BREAST LIMITED: CPT

## 2018-08-01 ENCOUNTER — HOSPITAL ENCOUNTER (OUTPATIENT)
Dept: WOMENS IMAGING | Age: 58
Discharge: HOME | End: 2018-08-01
Payer: COMMERCIAL

## 2018-08-01 DIAGNOSIS — N63.0 BREAST MASS: ICD-10-CM

## 2018-08-01 PROCEDURE — 88342 IMHCHEM/IMCYTCHM 1ST ANTB: CPT

## 2018-08-01 PROCEDURE — 88305 TISSUE EXAM BY PATHOLOGIST: CPT

## 2018-08-01 PROCEDURE — 77065 DX MAMMO INCL CAD UNI: CPT

## 2018-08-01 PROCEDURE — 2709999900 US BREAST BIOPSY W LOC DEVICE 1ST LESION LEFT

## 2018-08-03 ENCOUNTER — TELEPHONE (OUTPATIENT)
Dept: SURGERY | Age: 58
End: 2018-08-03

## 2018-08-03 ENCOUNTER — LAB REQUISITION (OUTPATIENT)
Dept: LAB | Facility: HOSPITAL | Age: 58
End: 2018-08-03

## 2018-08-03 DIAGNOSIS — Z00.00 ROUTINE GENERAL MEDICAL EXAMINATION AT A HEALTH CARE FACILITY: ICD-10-CM

## 2018-08-03 DIAGNOSIS — C50.412 MALIGNANT NEOPLASM OF UPPER-OUTER QUADRANT OF LEFT FEMALE BREAST, UNSPECIFIED ESTROGEN RECEPTOR STATUS (HCC): Primary | ICD-10-CM

## 2018-08-03 PROCEDURE — 88342 IMHCHEM/IMCYTCHM 1ST ANTB: CPT

## 2018-08-03 NOTE — TELEPHONE ENCOUNTER
Spoke with pt. Let her know that report was not back but preliminary looked positive for cancer. I'll call her back on Monday to give her report.

## 2018-08-07 ENCOUNTER — OFFICE VISIT (OUTPATIENT)
Dept: INTERNAL MEDICINE | Age: 58
End: 2018-08-07
Payer: COMMERCIAL

## 2018-08-07 VITALS
WEIGHT: 144.5 LBS | DIASTOLIC BLOOD PRESSURE: 70 MMHG | HEART RATE: 84 BPM | BODY MASS INDEX: 25.6 KG/M2 | SYSTOLIC BLOOD PRESSURE: 132 MMHG | HEIGHT: 63 IN | OXYGEN SATURATION: 98 %

## 2018-08-07 DIAGNOSIS — Z00.00 ROUTINE GENERAL MEDICAL EXAMINATION AT A HEALTH CARE FACILITY: Primary | ICD-10-CM

## 2018-08-07 DIAGNOSIS — I65.22 ATHEROSCLEROSIS OF LEFT CAROTID ARTERY: ICD-10-CM

## 2018-08-07 DIAGNOSIS — E78.2 MIXED HYPERLIPIDEMIA: ICD-10-CM

## 2018-08-07 DIAGNOSIS — I10 ESSENTIAL HYPERTENSION: ICD-10-CM

## 2018-08-07 DIAGNOSIS — Z23 NEED FOR PROPHYLACTIC VACCINATION AND INOCULATION AGAINST VARICELLA: ICD-10-CM

## 2018-08-07 DIAGNOSIS — Z23 NEED FOR PROPHYLACTIC VACCINATION AGAINST STREPTOCOCCUS PNEUMONIAE (PNEUMOCOCCUS): ICD-10-CM

## 2018-08-07 DIAGNOSIS — K43.9 VENTRAL HERNIA WITHOUT OBSTRUCTION OR GANGRENE: ICD-10-CM

## 2018-08-07 DIAGNOSIS — Z23 NEED FOR PROPHYLACTIC VACCINATION AGAINST DIPHTHERIA-TETANUS-PERTUSSIS (DTP): ICD-10-CM

## 2018-08-07 DIAGNOSIS — Z98.890 HISTORY OF BREAST BIOPSY: ICD-10-CM

## 2018-08-07 DIAGNOSIS — E11.9 TYPE 2 DIABETES MELLITUS WITHOUT COMPLICATION, WITHOUT LONG-TERM CURRENT USE OF INSULIN (HCC): ICD-10-CM

## 2018-08-07 DIAGNOSIS — C50.412 MALIGNANT NEOPLASM OF UPPER-OUTER QUADRANT OF LEFT FEMALE BREAST, UNSPECIFIED ESTROGEN RECEPTOR STATUS (HCC): Primary | ICD-10-CM

## 2018-08-07 DIAGNOSIS — C50.412 CARCINOMA OF UPPER-OUTER QUADRANT OF LEFT FEMALE BREAST, UNSPECIFIED ESTROGEN RECEPTOR STATUS (HCC): ICD-10-CM

## 2018-08-07 LAB
ALBUMIN SERPL-MCNC: 4.4 G/DL (ref 3.5–5.2)
ALP BLD-CCNC: 119 U/L (ref 35–104)
ALT SERPL-CCNC: 41 U/L (ref 5–33)
ANION GAP SERPL CALCULATED.3IONS-SCNC: 14 MMOL/L (ref 7–19)
AST SERPL-CCNC: 30 U/L (ref 5–32)
BASOPHILS ABSOLUTE: 0 K/UL (ref 0–0.2)
BASOPHILS RELATIVE PERCENT: 0.7 % (ref 0–1)
BILIRUB SERPL-MCNC: 1.1 MG/DL (ref 0.2–1.2)
BUN BLDV-MCNC: 12 MG/DL (ref 8–23)
CALCIUM SERPL-MCNC: 10 MG/DL (ref 8.8–10.2)
CHLORIDE BLD-SCNC: 102 MMOL/L (ref 98–111)
CHOLESTEROL, TOTAL: 113 MG/DL (ref 160–199)
CO2: 25 MMOL/L (ref 22–29)
CREAT SERPL-MCNC: 0.5 MG/DL (ref 0.5–0.9)
CREATININE URINE: 107.5 MG/DL (ref 4.2–622)
EOSINOPHILS ABSOLUTE: 0.2 K/UL (ref 0–0.6)
EOSINOPHILS RELATIVE PERCENT: 2.7 % (ref 0–5)
GFR NON-AFRICAN AMERICAN: >60
GLUCOSE BLD-MCNC: 125 MG/DL (ref 74–109)
HBA1C MFR BLD: 6.8 % (ref 4–6)
HCT VFR BLD CALC: 39.8 % (ref 37–47)
HDLC SERPL-MCNC: 40 MG/DL (ref 65–121)
HEMOGLOBIN: 13.2 G/DL (ref 12–16)
LDL CHOLESTEROL CALCULATED: 54 MG/DL
LYMPHOCYTES ABSOLUTE: 2.6 K/UL (ref 1.1–4.5)
LYMPHOCYTES RELATIVE PERCENT: 44.6 % (ref 20–40)
MCH RBC QN AUTO: 30.9 PG (ref 27–31)
MCHC RBC AUTO-ENTMCNC: 33.2 G/DL (ref 33–37)
MCV RBC AUTO: 93.2 FL (ref 81–99)
MICROALBUMIN UR-MCNC: <1.2 MG/DL (ref 0–19)
MICROALBUMIN/CREAT UR-RTO: NORMAL MG/G
MONOCYTES ABSOLUTE: 0.5 K/UL (ref 0–0.9)
MONOCYTES RELATIVE PERCENT: 8 % (ref 0–10)
NEUTROPHILS ABSOLUTE: 2.6 K/UL (ref 1.5–7.5)
NEUTROPHILS RELATIVE PERCENT: 43.8 % (ref 50–65)
PDW BLD-RTO: 12.4 % (ref 11.5–14.5)
PLATELET # BLD: 269 K/UL (ref 130–400)
PMV BLD AUTO: 9.7 FL (ref 9.4–12.3)
POTASSIUM SERPL-SCNC: 3.9 MMOL/L (ref 3.5–5)
RBC # BLD: 4.27 M/UL (ref 4.2–5.4)
SODIUM BLD-SCNC: 141 MMOL/L (ref 136–145)
TOTAL PROTEIN: 7.2 G/DL (ref 6.6–8.7)
TRIGL SERPL-MCNC: 94 MG/DL (ref 0–149)
WBC # BLD: 5.9 K/UL (ref 4.8–10.8)

## 2018-08-07 PROCEDURE — 90472 IMMUNIZATION ADMIN EACH ADD: CPT | Performed by: PHYSICIAN ASSISTANT

## 2018-08-07 PROCEDURE — 90471 IMMUNIZATION ADMIN: CPT | Performed by: PHYSICIAN ASSISTANT

## 2018-08-07 PROCEDURE — 90732 PPSV23 VACC 2 YRS+ SUBQ/IM: CPT | Performed by: PHYSICIAN ASSISTANT

## 2018-08-07 PROCEDURE — 99214 OFFICE O/P EST MOD 30 MIN: CPT | Performed by: PHYSICIAN ASSISTANT

## 2018-08-07 PROCEDURE — 90715 TDAP VACCINE 7 YRS/> IM: CPT | Performed by: PHYSICIAN ASSISTANT

## 2018-08-07 RX ORDER — IRBESARTAN 75 MG/1
TABLET ORAL
Qty: 45 TABLET | Refills: 3 | Status: SHIPPED | OUTPATIENT
Start: 2018-08-07 | End: 2019-01-09 | Stop reason: SDUPTHER

## 2018-08-07 RX ORDER — METFORMIN HYDROCHLORIDE 750 MG/1
750 TABLET, EXTENDED RELEASE ORAL
Qty: 90 TABLET | Refills: 3 | Status: SHIPPED | OUTPATIENT
Start: 2018-08-07 | End: 2019-01-09 | Stop reason: SDUPTHER

## 2018-08-07 RX ORDER — GLUCOSAMINE HCL/CHONDROITIN SU 500-400 MG
CAPSULE ORAL
Qty: 100 STRIP | Refills: 3 | Status: SHIPPED | OUTPATIENT
Start: 2018-08-07 | End: 2021-09-30 | Stop reason: SDUPTHER

## 2018-08-07 RX ORDER — ATORVASTATIN CALCIUM 40 MG/1
40 TABLET, FILM COATED ORAL DAILY
Qty: 90 TABLET | Refills: 3 | Status: SHIPPED | OUTPATIENT
Start: 2018-08-07 | End: 2019-01-09 | Stop reason: SDUPTHER

## 2018-08-07 ASSESSMENT — ENCOUNTER SYMPTOMS
WHEEZING: 0
CONSTIPATION: 0
EYE PAIN: 0
COLOR CHANGE: 0
DIARRHEA: 0
RHINORRHEA: 0
PHOTOPHOBIA: 0
VOMITING: 0
SHORTNESS OF BREATH: 0
CHEST TIGHTNESS: 0
ABDOMINAL PAIN: 0
SINUS PRESSURE: 0
SORE THROAT: 0
NAUSEA: 0
COUGH: 0
EYE REDNESS: 0

## 2018-08-07 NOTE — PROGRESS NOTES
[facility]  [address] [address]  Dept: [phone]  Dept Fax: [phone]: [phone]      [facility] INTERNAL MEDICINE  OFFICE NOTE      Chief Complaint   Patient presents with    Other     5 month f/u , needs new script for blood sugar monitor        Humble Junior is a 59y.o. year old female who is seen for 5 month follow-up for chronic conditions hypertension, hyperlipidemia, type 2 diabetes, coronary artery disease.,  Breast cancer. Patient's blood pressure seems to be stable at this time. Patient's current medication regiment seems to be adequate. Patient denies any chest pain, shortness of breath, palpitations. Patient states compliant with taking medication. Patient's blood pressure seems fairly well controlled on Avapro 75 mg half a tablet daily. Patient had carotid ultrasound with a 50% to 69% stenosis on the left carotid artery we will go and repeat the ultrasound of the carotid arteries to make sure there is no further progression. Patient's hyperlipidemia seems to be fairly well controlled on current medication regimen continue as directed. Patient denies any side effects from lipid lowering medication. Patient states trying to properly eat and exercise as directed. Patient's diabetes seems fairly well controlled on current medication regimen. Patient's current A1c   Lab Results   Component Value Date    LABA1C 6.8 (H) 08/07/2018    . patient denies any episodes of hypoglycemia. Patient continues to follow proper diabetic diet. Patient is exercising regularly. Patient states regular checking feet. Patient doing well on metformin and Victoza. Patient's currently seeing Dr. Karolina Barton in [facility] for eyes. Patient states that he saw him on 29 June 2018. Patient stated that she has follow-up with him. Patient states he saw something in her left eye but she didn't going to details.   Patient states does need a new tocometer machine and strips. Patient states was diagnosed with breast cancer as a 7 mm nodule and is scheduled to have a mammogram.  Patient is being seen by Dr. Sarwat Serrano. Patient states had a biopsy last year. And had an abdominal and after abnormal ultrasound. Pathology showed infiltrating mammillary carcinoma favor grade 1 0.7 cm in greatest linear dimension and both cores. Patient states is been eating well and going to the gym. Active Ambulatory Problems     Diagnosis Date Noted    Type 2 diabetes mellitus without complication (HCC)     Mixed hyperlipidemia     Essential hypertension     Abdominal hernia     History of breast biopsy 03/14/2018    Carotid artery plaque      Resolved Ambulatory Problems     Diagnosis Date Noted    Breast mass      Past Medical History:   Diagnosis Date    Abdominal hernia     [facility] ([facility]     Carotid artery plaque     Essential hypertension     H/O abnormal cervical Papanicolaou smear     Mixed hyperlipidemia     Osteopenia     Simple goiter     Type 2 diabetes mellitus without complication ([address]     Umbilical hernia        Past Medical History:   Diagnosis Date    Abdominal hernia     Cancer ([facility]     breast cancer    Carotid artery plaque     9/16 <50% rt;  50-69% left    Essential hypertension     H/O abnormal cervical Papanicolaou smear     Mixed hyperlipidemia     Osteopenia     Simple goiter     Type 2 diabetes mellitus without complication ([address]     Umbilical hernia        Prior to Visit Medications    Medication Sig Taking?  Authorizing Provider   atorvastatin (LIPITOR) 40 MG tablet Take 1 tablet by mouth daily Yes FLAQUITA Gibbs   irbesartan (AVAPRO) 75 MG tablet 1/2 tablet daily Yes FLAQUITA Gibbs   Liraglutide (VICTOZA) 18 MG/3ML SOPN SC injection Inject 1.2 mg into the skin daily Yes FLAQUITA Gibbs   metFORMIN (GLUCOPHAGE-XR) 750 MG extended release tablet Take 1 tablet by mouth daily (with BUN 12 2018    CREATININE 0.5 2018    GLUCOSE 125 (H) 2018    CALCIUM 10.0 2018    PROT 7.2 2018    LABALBU 4.4 2018    BILITOT 1.1 2018    ALKPHOS 119 (H) 2018    AST 30 2018    ALT 41 (H) 2018    LABGLOM >60 2018       Lab Results   Component Value Date    WBC 5.9 2018    HGB 13.2 2018    HCT 39.8 2018    MCV 93.2 2018     2018     Lab Results   Component Value Date    LABA1C 6.8 (H) 2018     No results found for: EAG  Lab Results   Component Value Date    CHOL 113 (L) 2018    CHOL 158 (L) 2018     Lab Results   Component Value Date    TRIG 94 2018    TRIG 112 2018     Lab Results   Component Value Date    HDL 40 (L) 2018    HDL 49 (L) 2018     Lab Results   Component Value Date    LDLCALC 54 2018    LDLCALC 87 2018     No results found for: LABVLDL, VLDL  No results found for: [facility]  Lab Results   Component Value Date    TSH 1.140 2018  9:49 AM - [address]     [facility]                                       [address] [address]  Department of Pathology  FINAL SURGICAL PATHOLOGY REPORT  Patient Name: Jaxon Gusman               Accession No:  [id number]   Age Sex:   1960    58 Y F        Pt Type:  O     [facility]                                             Location:  Account #     [de-identified]                 Collected:     2018  Med Rec #      [id number]                    Received:      2018  Attend Phys:                               Completed:     2018  Perform Phys: Betsy Collins    FINAL DIAGNOSIS:    Breast, left breast mass core biopsies at 2:00 position:   1.  Infiltrating mammary carcinoma, no special type, favor grade 1.   2.  Invasive carcinoma measures 0.7 cm in greatest linear dimension and  is present in both cores. COMMENT:   The specimen will be sent for immunoprognostic markers and  those results will follow as a separate report.      CBG/CBG    PREOP DIAGNOSIS:  LEFT BREAST MASS    SPECIMEN(S):  BREAST, CORE BIOPSY, LEFT BREAST BIOPSY 2 O'CLOCK    GROSS DESCRIPTION:   A specimen labeled with the patient's name and  designated \"left breast mass 2 o'clock\" is received fresh on moistened  filter paper in a petri dish and consists of 2 cylindrical cores of  yellow/tan fibrofatty breast parenchyma ranging in length from 1.8 down  to 1.7 cm and they reach 0.3 cm in diameter. No radiograph accompanies  the cores, so they are entirely submitted otherwise unaltered in a single  block. The estimated cold ischemic time is approximately 25 minutes and  the total formalin fixation time is approximately 9 hours, 45 minutes. CBG/WOLKHADAR    MICROSCOPIC DESCRIPTION:  Microscopic examination reveals sections of  cores of breast parenchyma demonstrating an infiltrative epithelial  lesion comprised of single file infiltrating strands of atypical  epithelial cells with high nuclear to cytoplasmic ratios and mildly  pleomorphic round hyperchromatic nuclei.  After reviewing the H&E stained  slides, an immunohistochemical stain for E-cadherin is performed using an  appropriately staining positive control.  The tumor cells stain  positively. CPT: 13628 X1   [id number] Andrea Ha MD  (Electronic Signature)  08/06/2018     Narrative   MAMMOGRAM POST BX CLIP PLACEMENT LEFT 8/1/2018 8:36 AM   HISTORY: N63.0   COMPARISON: 7/18/2018 and 7/24/2018   FINDINGS:   Standard 2-D mammography of the left breast was obtained in the CC and   MLO projections. Images demonstrate a biopsy clip in the region of the   previously seen mass. Postbiopsy changes are seen around the biopsy   clip. No other significant changes are noted.       Impression   1.  Postbiopsy changes with clip placement in the

## 2018-08-07 NOTE — TELEPHONE ENCOUNTER
Patient was informed:    MRI scheduled on 8/20/2018 at 10:00 Am -All instructions for test were given.   Left Breast US Scheduled at [facility] on 8/29/2018 @ 4 Pm and   Breast talk with Dr. Yesenia Neumann will follow at 5 PM

## 2018-08-09 ENCOUNTER — OFFICE VISIT (OUTPATIENT)
Dept: OBSTETRICS AND GYNECOLOGY | Facility: CLINIC | Age: 58
End: 2018-08-09

## 2018-08-09 VITALS
SYSTOLIC BLOOD PRESSURE: 130 MMHG | HEIGHT: 63 IN | WEIGHT: 143 LBS | BODY MASS INDEX: 25.34 KG/M2 | DIASTOLIC BLOOD PRESSURE: 84 MMHG

## 2018-08-09 DIAGNOSIS — Z12.4 CERVICAL CANCER SCREENING: ICD-10-CM

## 2018-08-09 DIAGNOSIS — Z01.419 WELL WOMAN EXAM WITH ROUTINE GYNECOLOGICAL EXAM: Primary | ICD-10-CM

## 2018-08-09 PROCEDURE — 99396 PREV VISIT EST AGE 40-64: CPT | Performed by: NURSE PRACTITIONER

## 2018-08-09 PROCEDURE — G0123 SCREEN CERV/VAG THIN LAYER: HCPCS | Performed by: NURSE PRACTITIONER

## 2018-08-09 RX ORDER — LANCETS
EACH MISCELLANEOUS
COMMUNITY

## 2018-08-09 RX ORDER — FOLIC ACID 1 MG/1
1 TABLET ORAL DAILY
COMMUNITY

## 2018-08-09 NOTE — PROGRESS NOTES
Attempted to obtain health maintenance information, patient unable to provide answers for the following items Hep C Screening.

## 2018-08-09 NOTE — PROGRESS NOTES
Tracey Pérez is a 58 y.o. female  YOB: 1960        Chief Complaint   Patient presents with   • Gynecologic Exam     Patient here for yearly exam. Last pap was done on 08/08/2017 which was normal. Patient has been having mammograms through Dr Merrill at [facility]. Patient has been recently dx with Grade I breast cancer in her left breast. Patient is being followed by Dr Figueroa at [facility].        Gynecologic Exam   The patient's pertinent negatives include no pelvic pain or vaginal discharge. Pertinent negatives include no back pain, constipation, diarrhea or rash.       The following portions of the patient's history were reviewed and updated as appropriate: allergies, current medications, past family history, past medical history, past social history, past surgical history and problem list.    No Known Allergies    Past Medical History:   Diagnosis Date   • Abnormal Pap smear of cervix     LGSIL   • Breast cancer (CMS/[facility])    • Cancer (CMS/HCC) 08/2018    left breast   • Cervical dysplasia    • Diabetes (CMS/[facility])     Type 2   • Hyperlipidemia        Family History   Problem Relation Age of Onset   • Prostate cancer Father    • No Known Problems Brother    • No Known Problems Sister    • Breast cancer Neg Hx    • Ovarian cancer Neg Hx    • Colon cancer Neg Hx    • Uterine cancer Neg Hx    • Melanoma Neg Hx        Social History     Social History   • Marital status:      Spouse name: N/A   • Number of children: N/A   • Years of education: N/A     Occupational History   • Not on file.     Social History Main Topics   • Smoking status: Never Smoker   • Smokeless tobacco: Never Used   • Alcohol use Yes      Comment: occ   • Drug use: No   • Sexual activity: Not on file     Other Topics Concern   • Not on file     Social History Narrative   • No narrative on file         Current Outpatient Prescriptions:   •  atorvastatin (LIPITOR) 40 MG tablet, , Disp: , Rfl: 2  •  calcium carbonate  (OS-NAHED) 600 MG tablet, Take 600 mg by mouth Daily., Disp: , Rfl:   •  Flaxseed, Linseed, (FLAX SEEDS PO), Take  by mouth., Disp: , Rfl:   •  folic acid (FOLVITE) 1 MG tablet, Take 1 mg by mouth Daily., Disp: , Rfl:   •  irbesartan (AVAPRO) 75 MG tablet, , Disp: , Rfl: 2  •  Lancets (ONETOUCH ULTRASOFT) lancets, OneTouch UltraSoft Lancets, Disp: , Rfl:   •  metFORMIN ER (GLUCOPHAGE-XR) 750 MG 24 hr tablet, , Disp: , Rfl: 2  •  Omega-3 Fatty Acids (FISH OIL) 1000 MG capsule capsule, Take  by mouth Daily With Breakfast., Disp: , Rfl:   •  Tdap (BOOSTRIX) 5-2.5-18.5 LF-MCG/0.5 injection, Boostrix Tdap 2.5 Lf unit-8 mcg-5 Lf/0.5 mL intramuscular syringe, Disp: , Rfl:   •  VICTOZA 18 MG/3ML solution pen-injector, , Disp: , Rfl: 2    No LMP recorded. Patient is postmenopausal.    Sexual History:         Could not be calculated    Past Surgical History:   Procedure Laterality Date   • BREAST BIOPSY Left     negative   • CHOLECYSTECTOMY     • COLPOSCOPY      LGSIL PAP   • WISDOM TOOTH EXTRACTION         Review of Systems   Constitutional: Negative for activity change and unexpected weight loss.   HENT: Negative for congestion.    Cardiovascular: Negative for chest pain.   Gastrointestinal: Negative for blood in stool, constipation and diarrhea.   Endocrine: Negative for cold intolerance and heat intolerance.   Genitourinary: Negative for dyspareunia, pelvic pain and vaginal discharge.   Musculoskeletal: Negative for arthralgias, back pain, neck pain and neck stiffness.   Skin: Negative for rash.   Neurological: Negative for dizziness and headache.   Psychiatric/Behavioral: Negative for sleep disturbance. The patient is not nervous/anxious.        Objective   Physical Exam   Constitutional: She is oriented to person, place, and time. She appears well-developed and well-nourished. No distress.   HENT:   Head: Normocephalic.   Right Ear: External ear normal.   Left Ear: External ear normal.   Nose: Nose normal.   Mouth/Throat:  Oropharynx is clear and moist.   Eyes: Conjunctivae are normal. Right eye exhibits no discharge. Left eye exhibits no discharge. No scleral icterus.   Neck: Normal range of motion. Neck supple. Carotid bruit is not present. No tracheal deviation present. No thyromegaly present.   Cardiovascular: Normal rate, regular rhythm, normal heart sounds and intact distal pulses.    No murmur heard.  Pulmonary/Chest: Effort normal and breath sounds normal. No respiratory distress. She has no wheezes. Right breast exhibits no inverted nipple, no mass, no nipple discharge, no skin change and no tenderness. Left breast exhibits no inverted nipple, no mass, no nipple discharge, no skin change and no tenderness. Breasts are symmetrical. There is no breast swelling.   Abdominal: Soft. She exhibits no distension and no mass. There is no tenderness. There is no guarding. No hernia. Hernia confirmed negative in the right inguinal area and confirmed negative in the left inguinal area.   Genitourinary: Rectum normal, vagina normal and uterus normal. Rectal exam shows no mass. No breast tenderness, discharge or bleeding. Pelvic exam was performed with patient supine. There is no rash, tenderness, lesion or injury on the right labia. There is no rash, tenderness, lesion or injury on the left labia. Uterus is not enlarged, not fixed and not tender. Cervix exhibits no motion tenderness, no discharge and no friability. Right adnexum displays no mass, no tenderness and no fullness. Left adnexum displays no mass, no tenderness and no fullness. No erythema, tenderness or bleeding in the vagina. No foreign body in the vagina. No signs of injury around the vagina. No vaginal discharge found.   Genitourinary Comments:   BSU normal  Urethral meatus  Normal  Perineum  Normal   Musculoskeletal: Normal range of motion. She exhibits no edema or tenderness.   Lymphadenopathy:        Head (right side): No submental, no submandibular, no tonsillar, no  "preauricular, no posterior auricular and no occipital adenopathy present.        Head (left side): No submental, no submandibular, no tonsillar, no preauricular, no posterior auricular and no occipital adenopathy present.     She has no cervical adenopathy.        Right cervical: No superficial cervical, no deep cervical and no posterior cervical adenopathy present.       Left cervical: No superficial cervical, no deep cervical and no posterior cervical adenopathy present.     She has no axillary adenopathy.        Right: No inguinal adenopathy present.        Left: No inguinal adenopathy present.   Neurological: She is alert and oriented to person, place, and time. Coordination normal.   Skin: Skin is warm and dry. No bruising and no rash noted. She is not diaphoretic. No erythema.   Psychiatric: She has a normal mood and affect. Her behavior is normal. Judgment and thought content normal.   Nursing note and vitals reviewed.        Vitals:    08/09/18 1014   BP: 130/84   Weight: 64.9 kg (143 lb)   Height: 160 cm (63\")       Carolina was seen today for gynecologic exam.    Diagnoses and all orders for this visit:    Well woman exam with routine gynecological exam  Comments:  Normal well woman exam.  Thin prep pap smear done.    Orders:  -     Liquid-based Pap Smear, Screening    Cervical cancer screening  Comments:  Thin prep pap smear done.   Orders:  -     Liquid-based Pap Smear, Screening          Patient's Body mass index is 25.33 kg/m². BMI is within normal parameters. No follow-up required.             Non-Smoker    MyChart Instructions Given       "

## 2018-08-10 LAB
LAB AP CASE REPORT: NORMAL
PATH REPORT.FINAL DX SPEC: NORMAL

## 2018-08-13 ENCOUNTER — HOSPITAL ENCOUNTER (OUTPATIENT)
Dept: VASCULAR LAB | Age: 58
Discharge: HOME | End: 2018-08-13
Payer: COMMERCIAL

## 2018-08-13 DIAGNOSIS — I65.22 ATHEROSCLEROSIS OF LEFT CAROTID ARTERY: ICD-10-CM

## 2018-08-13 PROCEDURE — 93880 EXTRACRANIAL BILAT STUDY: CPT

## 2018-08-15 LAB
GEN CATEG CVX/VAG CYTO-IMP: NORMAL
LAB AP CASE REPORT: NORMAL
LAB AP GYN ADDITIONAL INFORMATION: NORMAL
LAB AP GYN OTHER FINDINGS: NORMAL
PATH INTERP SPEC-IMP: NORMAL
STAT OF ADQ CVX/VAG CYTO-IMP: NORMAL

## 2018-08-20 ENCOUNTER — HOSPITAL ENCOUNTER (OUTPATIENT)
Dept: MRI IMAGING | Age: 58
Discharge: HOME | End: 2018-08-20
Payer: COMMERCIAL

## 2018-08-20 DIAGNOSIS — C50.412 MALIGNANT NEOPLASM OF UPPER-OUTER QUADRANT OF LEFT FEMALE BREAST, UNSPECIFIED ESTROGEN RECEPTOR STATUS (HCC): ICD-10-CM

## 2018-08-20 PROCEDURE — A9577 INJ MULTIHANCE: HCPCS | Performed by: PHYSICIAN ASSISTANT

## 2018-08-20 PROCEDURE — 6360000004 HC RX CONTRAST MEDICATION: Performed by: PHYSICIAN ASSISTANT

## 2018-08-20 PROCEDURE — C8908 MRI W/O FOL W/CONT, BREAST,: HCPCS

## 2018-08-20 RX ADMIN — GADOBENATE DIMEGLUMINE 13 ML: 529 INJECTION, SOLUTION INTRAVENOUS at 11:13

## 2018-08-21 ENCOUNTER — TELEPHONE (OUTPATIENT)
Dept: OTHER | Age: 58
End: 2018-08-21

## 2018-08-29 ENCOUNTER — INITIAL CONSULT (OUTPATIENT)
Dept: SURGERY | Age: 58
End: 2018-08-29
Payer: COMMERCIAL

## 2018-08-29 ENCOUNTER — HOSPITAL ENCOUNTER (OUTPATIENT)
Dept: WOMENS IMAGING | Age: 58
Discharge: HOME | End: 2018-08-29
Payer: COMMERCIAL

## 2018-08-29 DIAGNOSIS — C50.412 MALIGNANT NEOPLASM OF UPPER-OUTER QUADRANT OF LEFT FEMALE BREAST, UNSPECIFIED ESTROGEN RECEPTOR STATUS (HCC): ICD-10-CM

## 2018-08-29 DIAGNOSIS — C50.812 MALIGNANT NEOPLASM OF OVERLAPPING SITES OF LEFT BREAST IN FEMALE, ESTROGEN RECEPTOR POSITIVE (HCC): ICD-10-CM

## 2018-08-29 DIAGNOSIS — Z17.0 MALIGNANT NEOPLASM OF OVERLAPPING SITES OF LEFT BREAST IN FEMALE, ESTROGEN RECEPTOR POSITIVE (HCC): ICD-10-CM

## 2018-08-29 PROCEDURE — 99215 OFFICE O/P EST HI 40 MIN: CPT | Performed by: SURGERY

## 2018-08-29 PROCEDURE — 99354 PR PROLONGED SVC OUTPATIENT SETTING 1ST HOUR: CPT | Performed by: SURGERY

## 2018-09-11 PROBLEM — Z17.0 MALIGNANT NEOPLASM OF OVERLAPPING SITES OF LEFT BREAST IN FEMALE, ESTROGEN RECEPTOR POSITIVE (HCC): Status: ACTIVE | Noted: 2018-09-11

## 2018-09-11 PROBLEM — C50.812 MALIGNANT NEOPLASM OF OVERLAPPING SITES OF LEFT BREAST IN FEMALE, ESTROGEN RECEPTOR POSITIVE (HCC): Status: ACTIVE | Noted: 2018-09-11

## 2018-09-18 ENCOUNTER — HOSPITAL ENCOUNTER (OUTPATIENT)
Dept: PREADMISSION TESTING | Age: 58
Discharge: HOME | End: 2018-09-22
Payer: COMMERCIAL

## 2018-09-18 VITALS — HEIGHT: 63 IN | WEIGHT: 145 LBS | BODY MASS INDEX: 25.69 KG/M2

## 2018-09-18 LAB
EKG P AXIS: 15 DEGREES
EKG P-R INTERVAL: 198 MS
EKG Q-T INTERVAL: 384 MS
EKG QRS DURATION: 92 MS
EKG QTC CALCULATION (BAZETT): 398 MS
EKG T AXIS: -19 DEGREES

## 2018-09-18 PROCEDURE — 93005 ELECTROCARDIOGRAM TRACING: CPT

## 2018-09-27 ENCOUNTER — HOSPITAL ENCOUNTER (OUTPATIENT)
Dept: WOMENS IMAGING | Age: 58
Discharge: HOME | End: 2018-09-27
Payer: COMMERCIAL

## 2018-09-27 DIAGNOSIS — C50.412 MALIGNANT NEOPLASM OF UPPER-OUTER QUADRANT OF LEFT FEMALE BREAST, UNSPECIFIED ESTROGEN RECEPTOR STATUS (HCC): ICD-10-CM

## 2018-09-27 PROCEDURE — 76642 ULTRASOUND BREAST LIMITED: CPT

## 2018-09-28 ENCOUNTER — HOSPITAL ENCOUNTER (OUTPATIENT)
Dept: NUCLEAR MEDICINE | Age: 58
Discharge: HOME | End: 2018-09-30
Payer: COMMERCIAL

## 2018-09-28 ENCOUNTER — ANESTHESIA (OUTPATIENT)
Dept: OPERATING ROOM | Age: 58
End: 2018-09-28
Payer: COMMERCIAL

## 2018-09-28 ENCOUNTER — HOSPITAL ENCOUNTER (OUTPATIENT)
Age: 58
Setting detail: OUTPATIENT SURGERY
Discharge: HOME | End: 2018-09-28
Attending: SURGERY | Admitting: SURGERY
Payer: COMMERCIAL

## 2018-09-28 ENCOUNTER — HOSPITAL ENCOUNTER (OUTPATIENT)
Dept: ULTRASOUND IMAGING | Age: 58
Discharge: HOME | End: 2018-09-28
Payer: COMMERCIAL

## 2018-09-28 ENCOUNTER — ANESTHESIA EVENT (OUTPATIENT)
Dept: OPERATING ROOM | Age: 58
End: 2018-09-28
Payer: COMMERCIAL

## 2018-09-28 VITALS
SYSTOLIC BLOOD PRESSURE: 168 MMHG | TEMPERATURE: 97.6 F | OXYGEN SATURATION: 96 % | HEART RATE: 82 BPM | WEIGHT: 140 LBS | BODY MASS INDEX: 24.8 KG/M2 | RESPIRATION RATE: 16 BRPM | DIASTOLIC BLOOD PRESSURE: 73 MMHG | HEIGHT: 63 IN

## 2018-09-28 VITALS
SYSTOLIC BLOOD PRESSURE: 136 MMHG | TEMPERATURE: 97 F | DIASTOLIC BLOOD PRESSURE: 52 MMHG | RESPIRATION RATE: 2 BRPM | OXYGEN SATURATION: 97 %

## 2018-09-28 DIAGNOSIS — C50.412 MALIGNANT NEOPLASM OF UPPER-OUTER QUADRANT OF LEFT FEMALE BREAST, UNSPECIFIED ESTROGEN RECEPTOR STATUS (HCC): ICD-10-CM

## 2018-09-28 DIAGNOSIS — Z17.0 MALIGNANT NEOPLASM OF OVERLAPPING SITES OF LEFT BREAST IN FEMALE, ESTROGEN RECEPTOR POSITIVE (HCC): Primary | ICD-10-CM

## 2018-09-28 DIAGNOSIS — C50.812 MALIGNANT NEOPLASM OF OVERLAPPING SITES OF LEFT BREAST IN FEMALE, ESTROGEN RECEPTOR POSITIVE (HCC): Primary | ICD-10-CM

## 2018-09-28 LAB
GLUCOSE BLD-MCNC: 153 MG/DL (ref 70–99)
PERFORMED ON: ABNORMAL

## 2018-09-28 PROCEDURE — 3600000005 HC SURGERY LEVEL 5 BASE: Performed by: SURGERY

## 2018-09-28 PROCEDURE — 19301 PARTIAL MASTECTOMY: CPT | Performed by: SURGERY

## 2018-09-28 PROCEDURE — 2580000003 HC RX 258: Performed by: ANESTHESIOLOGY

## 2018-09-28 PROCEDURE — 7100000001 HC PACU RECOVERY - ADDTL 15 MIN: Performed by: SURGERY

## 2018-09-28 PROCEDURE — 14301 TIS TRNFR ANY 30.1-60 SQ CM: CPT | Performed by: SURGERY

## 2018-09-28 PROCEDURE — 19285 PERQ DEV BREAST 1ST US IMAG: CPT

## 2018-09-28 PROCEDURE — 2709999900 HC NON-CHARGEABLE SUPPLY: Performed by: SURGERY

## 2018-09-28 PROCEDURE — 6360000002 HC RX W HCPCS

## 2018-09-28 PROCEDURE — 19499 UNLISTED PROCEDURE BREAST: CPT | Performed by: SURGERY

## 2018-09-28 PROCEDURE — 2500000003 HC RX 250 WO HCPCS

## 2018-09-28 PROCEDURE — 14302 TIS TRNFR ADDL 30 SQ CM: CPT | Performed by: SURGERY

## 2018-09-28 PROCEDURE — 77424 IO RAD TX DELIVERY BY X-RAY: CPT | Performed by: SURGERY

## 2018-09-28 PROCEDURE — 7100000000 HC PACU RECOVERY - FIRST 15 MIN: Performed by: SURGERY

## 2018-09-28 PROCEDURE — C1728 CATH, BRACHYTX SEED ADM: HCPCS | Performed by: SURGERY

## 2018-09-28 PROCEDURE — 38792 RA TRACER ID OF SENTINL NODE: CPT

## 2018-09-28 PROCEDURE — 6370000000 HC RX 637 (ALT 250 FOR IP): Performed by: ANESTHESIOLOGY

## 2018-09-28 PROCEDURE — 82948 REAGENT STRIP/BLOOD GLUCOSE: CPT

## 2018-09-28 PROCEDURE — 3600000015 HC SURGERY LEVEL 5 ADDTL 15MIN: Performed by: SURGERY

## 2018-09-28 PROCEDURE — A9520 TC99 TILMANOCEPT DIAG 0.5MCI: HCPCS | Performed by: SURGERY

## 2018-09-28 PROCEDURE — 3700000001 HC ADD 15 MINUTES (ANESTHESIA): Performed by: SURGERY

## 2018-09-28 PROCEDURE — 19285 PERQ DEV BREAST 1ST US IMAG: CPT | Performed by: SURGERY

## 2018-09-28 PROCEDURE — 3700000000 HC ANESTHESIA ATTENDED CARE: Performed by: SURGERY

## 2018-09-28 PROCEDURE — 2580000003 HC RX 258: Performed by: SURGERY

## 2018-09-28 PROCEDURE — 6360000002 HC RX W HCPCS: Performed by: ANESTHESIOLOGY

## 2018-09-28 PROCEDURE — 88307 TISSUE EXAM BY PATHOLOGIST: CPT

## 2018-09-28 PROCEDURE — 7100000010 HC PHASE II RECOVERY - FIRST 15 MIN: Performed by: SURGERY

## 2018-09-28 PROCEDURE — 3430000000 HC RX DIAGNOSTIC RADIOPHARMACEUTICAL: Performed by: SURGERY

## 2018-09-28 PROCEDURE — 7100000011 HC PHASE II RECOVERY - ADDTL 15 MIN: Performed by: SURGERY

## 2018-09-28 PROCEDURE — 6360000002 HC RX W HCPCS: Performed by: SURGERY

## 2018-09-28 PROCEDURE — A4648 IMPLANTABLE TISSUE MARKER: HCPCS | Performed by: SURGERY

## 2018-09-28 PROCEDURE — 19297 PLACE BREAST CATH FOR RAD: CPT | Performed by: SURGERY

## 2018-09-28 PROCEDURE — 2500000003 HC RX 250 WO HCPCS: Performed by: SURGERY

## 2018-09-28 DEVICE — THE MARKER IS A RADIOGRAPHIC IMPLANTABLE MARKER USED TO MARK SOFT TISSUE.IT IS COMPRISED OF A BIOABSORBABLE SPACER THAT HOLDS RADIOPAQUE MARKER CLIPS.
Type: IMPLANTABLE DEVICE | Site: BREAST | Status: FUNCTIONAL
Brand: BIOZORB MARKER

## 2018-09-28 RX ORDER — HYDROCODONE BITARTRATE AND ACETAMINOPHEN 5; 325 MG/1; MG/1
1 TABLET ORAL EVERY 6 HOURS PRN
Qty: 20 TABLET | Refills: 0 | Status: SHIPPED | OUTPATIENT
Start: 2018-09-28 | End: 2018-11-02

## 2018-09-28 RX ORDER — SODIUM CHLORIDE, SODIUM LACTATE, POTASSIUM CHLORIDE, CALCIUM CHLORIDE 600; 310; 30; 20 MG/100ML; MG/100ML; MG/100ML; MG/100ML
INJECTION, SOLUTION INTRAVENOUS CONTINUOUS
Status: DISCONTINUED | OUTPATIENT
Start: 2018-09-28 | End: 2018-09-28 | Stop reason: HOSPADM

## 2018-09-28 RX ORDER — MIDAZOLAM HYDROCHLORIDE 1 MG/ML
2 INJECTION INTRAMUSCULAR; INTRAVENOUS
Status: DISCONTINUED | OUTPATIENT
Start: 2018-09-28 | End: 2018-09-28 | Stop reason: HOSPADM

## 2018-09-28 RX ORDER — PROMETHAZINE HYDROCHLORIDE 25 MG/ML
6.25 INJECTION, SOLUTION INTRAMUSCULAR; INTRAVENOUS
Status: DISCONTINUED | OUTPATIENT
Start: 2018-09-28 | End: 2018-09-28 | Stop reason: HOSPADM

## 2018-09-28 RX ORDER — HYDRALAZINE HYDROCHLORIDE 20 MG/ML
5 INJECTION INTRAMUSCULAR; INTRAVENOUS EVERY 10 MIN PRN
Status: DISCONTINUED | OUTPATIENT
Start: 2018-09-28 | End: 2018-09-28 | Stop reason: HOSPADM

## 2018-09-28 RX ORDER — SODIUM CHLORIDE 0.9 % (FLUSH) 0.9 %
10 SYRINGE (ML) INJECTION PRN
Status: DISCONTINUED | OUTPATIENT
Start: 2018-09-28 | End: 2018-09-28 | Stop reason: HOSPADM

## 2018-09-28 RX ORDER — ISOSULFAN BLUE 50 MG/5ML
INJECTION, SOLUTION SUBCUTANEOUS PRN
Status: DISCONTINUED | OUTPATIENT
Start: 2018-09-28 | End: 2018-09-28 | Stop reason: HOSPADM

## 2018-09-28 RX ORDER — PROPOFOL 10 MG/ML
INJECTION, EMULSION INTRAVENOUS PRN
Status: DISCONTINUED | OUTPATIENT
Start: 2018-09-28 | End: 2018-09-28 | Stop reason: SDUPTHER

## 2018-09-28 RX ORDER — MORPHINE SULFATE/0.9% NACL/PF 1 MG/ML
4 SYRINGE (ML) INJECTION EVERY 5 MIN PRN
Status: DISCONTINUED | OUTPATIENT
Start: 2018-09-28 | End: 2018-09-28 | Stop reason: HOSPADM

## 2018-09-28 RX ORDER — LIDOCAINE HYDROCHLORIDE 10 MG/ML
INJECTION, SOLUTION EPIDURAL; INFILTRATION; INTRACAUDAL; PERINEURAL PRN
Status: DISCONTINUED | OUTPATIENT
Start: 2018-09-28 | End: 2018-09-28 | Stop reason: SDUPTHER

## 2018-09-28 RX ORDER — FENTANYL CITRATE 50 UG/ML
INJECTION, SOLUTION INTRAMUSCULAR; INTRAVENOUS PRN
Status: DISCONTINUED | OUTPATIENT
Start: 2018-09-28 | End: 2018-09-28 | Stop reason: SDUPTHER

## 2018-09-28 RX ORDER — METOCLOPRAMIDE HYDROCHLORIDE 5 MG/ML
10 INJECTION INTRAMUSCULAR; INTRAVENOUS
Status: DISCONTINUED | OUTPATIENT
Start: 2018-09-28 | End: 2018-09-28 | Stop reason: HOSPADM

## 2018-09-28 RX ORDER — DIPHENHYDRAMINE HYDROCHLORIDE 50 MG/ML
12.5 INJECTION INTRAMUSCULAR; INTRAVENOUS
Status: DISCONTINUED | OUTPATIENT
Start: 2018-09-28 | End: 2018-09-28 | Stop reason: HOSPADM

## 2018-09-28 RX ORDER — SCOLOPAMINE TRANSDERMAL SYSTEM 1 MG/1
1 PATCH, EXTENDED RELEASE TRANSDERMAL
Status: DISCONTINUED | OUTPATIENT
Start: 2018-09-28 | End: 2018-09-28 | Stop reason: HOSPADM

## 2018-09-28 RX ORDER — CEPHALEXIN 500 MG/1
500 CAPSULE ORAL 3 TIMES DAILY
Qty: 30 CAPSULE | Refills: 0 | Status: SHIPPED | OUTPATIENT
Start: 2018-09-28 | End: 2018-10-08

## 2018-09-28 RX ORDER — ONDANSETRON 2 MG/ML
INJECTION INTRAMUSCULAR; INTRAVENOUS PRN
Status: DISCONTINUED | OUTPATIENT
Start: 2018-09-28 | End: 2018-09-28 | Stop reason: SDUPTHER

## 2018-09-28 RX ORDER — MIDAZOLAM HYDROCHLORIDE 1 MG/ML
INJECTION INTRAMUSCULAR; INTRAVENOUS PRN
Status: DISCONTINUED | OUTPATIENT
Start: 2018-09-28 | End: 2018-09-28 | Stop reason: SDUPTHER

## 2018-09-28 RX ORDER — ENALAPRILAT 2.5 MG/2ML
1.25 INJECTION INTRAVENOUS
Status: DISCONTINUED | OUTPATIENT
Start: 2018-09-28 | End: 2018-09-28 | Stop reason: HOSPADM

## 2018-09-28 RX ORDER — MORPHINE SULFATE/0.9% NACL/PF 1 MG/ML
2 SYRINGE (ML) INJECTION EVERY 5 MIN PRN
Status: DISCONTINUED | OUTPATIENT
Start: 2018-09-28 | End: 2018-09-28 | Stop reason: HOSPADM

## 2018-09-28 RX ORDER — SODIUM CHLORIDE 0.9 % (FLUSH) 0.9 %
10 SYRINGE (ML) INJECTION EVERY 12 HOURS SCHEDULED
Status: DISCONTINUED | OUTPATIENT
Start: 2018-09-28 | End: 2018-09-28 | Stop reason: HOSPADM

## 2018-09-28 RX ORDER — FENTANYL CITRATE 50 UG/ML
50 INJECTION, SOLUTION INTRAMUSCULAR; INTRAVENOUS
Status: DISCONTINUED | OUTPATIENT
Start: 2018-09-28 | End: 2018-09-28 | Stop reason: HOSPADM

## 2018-09-28 RX ORDER — FENTANYL CITRATE 50 UG/ML
25 INJECTION, SOLUTION INTRAMUSCULAR; INTRAVENOUS
Status: DISCONTINUED | OUTPATIENT
Start: 2018-09-28 | End: 2018-09-28 | Stop reason: HOSPADM

## 2018-09-28 RX ORDER — MEPERIDINE HYDROCHLORIDE 50 MG/ML
12.5 INJECTION INTRAMUSCULAR; INTRAVENOUS; SUBCUTANEOUS EVERY 5 MIN PRN
Status: DISCONTINUED | OUTPATIENT
Start: 2018-09-28 | End: 2018-09-28 | Stop reason: HOSPADM

## 2018-09-28 RX ORDER — METOCLOPRAMIDE HYDROCHLORIDE 5 MG/ML
10 INJECTION INTRAMUSCULAR; INTRAVENOUS ONCE
Status: COMPLETED | OUTPATIENT
Start: 2018-09-28 | End: 2018-09-28

## 2018-09-28 RX ORDER — LABETALOL HYDROCHLORIDE 5 MG/ML
5 INJECTION, SOLUTION INTRAVENOUS EVERY 10 MIN PRN
Status: DISCONTINUED | OUTPATIENT
Start: 2018-09-28 | End: 2018-09-28 | Stop reason: HOSPADM

## 2018-09-28 RX ORDER — LIDOCAINE HYDROCHLORIDE 10 MG/ML
1 INJECTION, SOLUTION EPIDURAL; INFILTRATION; INTRACAUDAL; PERINEURAL
Status: DISCONTINUED | OUTPATIENT
Start: 2018-09-28 | End: 2018-09-28 | Stop reason: HOSPADM

## 2018-09-28 RX ORDER — DEXAMETHASONE SODIUM PHOSPHATE 10 MG/ML
INJECTION INTRAMUSCULAR; INTRAVENOUS PRN
Status: DISCONTINUED | OUTPATIENT
Start: 2018-09-28 | End: 2018-09-28 | Stop reason: SDUPTHER

## 2018-09-28 RX ADMIN — FENTANYL CITRATE 50 MCG: 50 INJECTION INTRAMUSCULAR; INTRAVENOUS at 12:40

## 2018-09-28 RX ADMIN — LIDOCAINE HYDROCHLORIDE 50 MG: 10 INJECTION, SOLUTION EPIDURAL; INFILTRATION; INTRACAUDAL; PERINEURAL at 12:16

## 2018-09-28 RX ADMIN — SODIUM CHLORIDE, SODIUM LACTATE, POTASSIUM CHLORIDE, AND CALCIUM CHLORIDE: 600; 310; 30; 20 INJECTION, SOLUTION INTRAVENOUS at 13:05

## 2018-09-28 RX ADMIN — HYDROMORPHONE HYDROCHLORIDE 1 MG: 1 INJECTION, SOLUTION INTRAMUSCULAR; INTRAVENOUS; SUBCUTANEOUS at 15:04

## 2018-09-28 RX ADMIN — ONDANSETRON HYDROCHLORIDE 4 MG: 2 INJECTION, SOLUTION INTRAMUSCULAR; INTRAVENOUS at 15:04

## 2018-09-28 RX ADMIN — METOCLOPRAMIDE 10 MG: 5 INJECTION, SOLUTION INTRAMUSCULAR; INTRAVENOUS at 09:52

## 2018-09-28 RX ADMIN — FENTANYL CITRATE 50 MCG: 50 INJECTION INTRAMUSCULAR; INTRAVENOUS at 12:00

## 2018-09-28 RX ADMIN — SODIUM CHLORIDE, SODIUM LACTATE, POTASSIUM CHLORIDE, AND CALCIUM CHLORIDE: 600; 310; 30; 20 INJECTION, SOLUTION INTRAVENOUS at 09:47

## 2018-09-28 RX ADMIN — FENTANYL CITRATE 50 MCG: 50 INJECTION INTRAMUSCULAR; INTRAVENOUS at 13:05

## 2018-09-28 RX ADMIN — MIDAZOLAM 2 MG: 1 INJECTION INTRAMUSCULAR; INTRAVENOUS at 11:55

## 2018-09-28 RX ADMIN — FENTANYL CITRATE 50 MCG: 50 INJECTION INTRAMUSCULAR; INTRAVENOUS at 14:00

## 2018-09-28 RX ADMIN — DEXAMETHASONE SODIUM PHOSPHATE 10 MG: 10 INJECTION INTRAMUSCULAR; INTRAVENOUS at 12:34

## 2018-09-28 RX ADMIN — FENTANYL CITRATE 50 MCG: 50 INJECTION INTRAMUSCULAR; INTRAVENOUS at 12:15

## 2018-09-28 RX ADMIN — PROPOFOL 160 MG: 10 INJECTION, EMULSION INTRAVENOUS at 12:16

## 2018-09-28 RX ADMIN — TILMANOCEPT 0.5 MILLICURIE: KIT at 13:23

## 2018-09-28 RX ADMIN — Medication 2 G: at 12:00

## 2018-09-28 ASSESSMENT — LIFESTYLE VARIABLES: SMOKING_STATUS: 0

## 2018-09-28 ASSESSMENT — PAIN SCALES - GENERAL
PAINLEVEL_OUTOF10: 0
PAINLEVEL_OUTOF10: 0

## 2018-09-28 ASSESSMENT — PAIN - FUNCTIONAL ASSESSMENT: PAIN_FUNCTIONAL_ASSESSMENT: 0-10

## 2018-09-28 NOTE — CONSULTS
POCT Glucose    Collection Time: 09/28/18  9:45 AM   Result Value Ref Range    POC Glucose 153 (H) 70 - 99 mg/dl    Performed on [employer]        IMAGING:  Us Breast Limited Left    Result Date: 9/27/2018  EXAMINATION: Limited left breast ultrasound 9/27/2018 HISTORY: Sonography was utilized for surgical planning for tomorrow's lumpectomy. The clip marking the biopsy site is visualized with the skin overlying the clip marked. The films are available to the OR for review. Signed by Dr Cherlyn Leventhal on 9/27/2018 2:49 PM      IMPRESSION AND PLAN:  Stage: IA (T1c N0 M0)    After full discussion of treatment options, the patient has elected to proceed with left breast lumpectomy and left axillary sentinel lymph node biopsy. This will be followed by consideration of intraoperative radiation therapy using the [facility] electronic brachytherapy system.        Panda Brock MD    2/50/6727 12:05 PM

## 2018-09-28 NOTE — H&P
discussed the BrCa genetic analysis and why it is  appropriate for her. We discussed breast MRI and how it assists in evaluation of breast cancers and the results of her MRI if done.       We discussed the surgical options including simple mastectomy and lumpectomy with  sentinel lymph node biopsy as well as the possibility of axillary lymph node dissection. We explained in depth why breast conservation therapy requires radiation treatments for the majority of women. These treatments may be external beam for 6 weeks, partial breast for 5 days,  or intraoperative. I explained that most women treated for invasive malignancy do receive systemic therapy, hormonal therapy or  chemotherapy postoperatively depending upon the final pathology, the lymph node status, and the hormone receptor status. I discussed Oncotype Dx, Mammoprint, and Adjuvant Online as tools which aid in the decision for chemotherapy or hormonal therapy. We also discussed the possibility of breast reconstruction if a mastectomy was required. .  I explained to her the different techniques including placement of a subpectoral implant with a Alloderm sling  versus TRAM flap reconstruction as welll as other methods of reconstruction. She does not wish to pursue reconstruction at this time.         After a prolonged discussion lasting 90 minutes  we felt it was most appropriate that she undergo left lumpectomy, and sentinel node biopsy, with preop ultrasound and and intraop ultrasound guided needle localization with IORT             We discussed the risks and benefits of the surgery including but not limited to bleeding, infection, pain, lymphedema, numbness, and also the risks of general anesthesia including pneumonia, blood clots, heart attack, stroke, and death.   She expresses good understanding and is agreeable to proceed with surgery.       We will schedule this to be done when convenient  at [facility].

## 2018-09-28 NOTE — ANESTHESIA POSTPROCEDURE EVALUATION
Department of Anesthesiology  Postprocedure Note    Patient: Cayetano Irby  MRN: [id number]  YOB: 1960  Date of evaluation: 9/28/2018  Time:  3:34 PM     Procedure Summary     Date:  09/28/18 Room / Location:  [facility] OR [address] [place] [address] OR    Anesthesia Start:  1454 Anesthesia Stop:  7233    Procedure:  BREAST LUMPECTOMY AND INTRAOPERATIVE ULTRASOUND GUIDED NEEDLE LOCALIZATION AND IORT (Left ) Diagnosis:  (U24.448)    Surgeon:  Arabella Cross MD Responsible Provider:  JAVAN Hoskins CRNA    Anesthesia Type:  general ASA Status:  3          Anesthesia Type: general    Julio Phase I:      Julio Phase II:      Last vitals: Reviewed and per EMR flowsheets.        Anesthesia Post Evaluation

## 2018-09-28 NOTE — ANESTHESIA PRE PROCEDURE
Department of Anesthesiology  Preprocedure Note       Name:  Humble Junior   Age:  59 y.o.  :  1960                                          MRN:  [id number]         Date:  2018      Surgeon: Debbie Lowery):  Amberly Carrion MD    Procedure: Procedure(s):  BREAST LUMPECTOMY WITH SENTINAL NODE BIOPSY AND INTRA OP ULTRASOUND GUIDED NEEDLE LOCALIZATION AND IORT    Medications prior to admission:   Prior to Admission medications    Medication Sig Start Date End Date Taking? Authorizing Provider   atorvastatin (LIPITOR) 40 MG tablet Take 1 tablet by mouth daily 18   FLAQUITA Browning   irbesartan (AVAPRO) 75 MG tablet 1/2 tablet daily 18   FLAQUITA Browning   Liraglutide (VICTOZA) 18 MG/3ML SOPN SC injection Inject 1.2 mg into the skin daily 18   FLAQUITA Browning   metFORMIN (GLUCOPHAGE-XR) 750 MG extended release tablet Take 1 tablet by mouth daily (with breakfast) 18   FLAQUITA Browning   blood glucose monitor kit and supplies 1 kit by Other route daily Test 1 times a day & as needed for symptoms of irregular blood glucose. 18   FLAQUITA Browning   blood glucose monitor strips Test blood sugars daily.  18   FLAQUITA Browning   BD PEN NEEDLE PRIYANKA U/F 32G X 4 MM MISC USE AS DIRECTED WITH VICTOZA 10/12/17   Netta Kirk MD   Multiple Minerals-Vitamins (CITRACAL PLUS PO) Take 2 tablets by mouth 2 times daily    Historical Provider, MD   Omega-3 Fatty Acids (FISH OIL) 1000 MG CAPS Take 3,000 mg by mouth daily    Historical Provider, MD   folic acid (FOLVITE) 1 MG tablet Take 1 mg by mouth daily    Historical Provider, MD   Ascorbic Acid (VITAMIN C) 500 MG tablet Take 500 mg by mouth daily    Historical Provider, MD       Current medications:    Current Facility-Administered Medications   Medication Dose Route Frequency Provider Last Rate Last Dose    HYDROmorphone (DILAUDID) injection 0.25 mg  0.25 mg Intravenous Q5 Min PRN JAVAN Villalta - ANAI        HYDROmorphone (DILAUDID) injection 0.5 mg  0.5 mg Intravenous Q5 Min PRN Elinda Brilliant, APRN - CRNA        morphine injection 2 mg  2 mg Intravenous Q5 Min PRN Elinda Brilliant, APRN - CRNA        morphine injection 4 mg  4 mg Intravenous Q5 Min PRN Elinda Brilliant, APRN - CRNA        diphenhydrAMINE (BENADRYL) injection 12.5 mg  12.5 mg Intravenous Once PRN Elinda Brilliant, APRN - CRNA        promethazine (PHENERGAN) injection 6.25 mg  6.25 mg Intravenous Once PRN Elinda Brilliant, APRN - CRNA        metoclopramide (REGLAN) injection 10 mg  10 mg Intravenous Once PRN Elinda Brilliant, APRN - CRNA        labetalol (NORMODYNE;TRANDATE) injection 5 mg  5 mg Intravenous Q10 Min PRN Elinda Brilliant, APRN - CRNA        hydrALAZINE (APRESOLINE) injection 5 mg  5 mg Intravenous Q10 Min PRN Elinda Brilliant, APRN - CRNA        enalaprilat (VASOTEC) injection 1.25 mg  1.25 mg Intravenous Once PRN Elinda Brilliant, APRN - CRNA        meperidine (DEMEROL) injection 12.5 mg  12.5 mg Intravenous Q5 Min PRN Elinda Brilliant, APRN - CRNA           Allergies:  No Known Allergies    Problem List:    Patient Active Problem List   Diagnosis Code    Type 2 diabetes mellitus without complication ([facility] I62.1    Mixed hyperlipidemia E78.2    Essential hypertension I10    Abdominal hernia K46.9    History of breast biopsy Z98.890    Carotid artery plaque I65.29    Malignant neoplasm of overlapping sites of left breast in female, estrogen receptor positive ([facility] C50.812, Z17.0       Past Medical History:        Diagnosis Date    Abdominal hernia     Cancer ([facility]     breast cancer    Carotid artery plaque     9/16 <50% rt;  50-69% left    Essential hypertension     H/O abnormal cervical Papanicolaou smear     Mixed hyperlipidemia     Osteopenia     Simple goiter     Type 2 diabetes mellitus without complication ([facility]     Umbilical hernia        Past Surgical History:        Procedure Laterality Date    CHOLECYSTECTOMY  1997    COLONOSCOPY      COLPOSCOPY

## 2018-09-28 NOTE — BRIEF OP NOTE
Brief Postoperative Note      DATE OF PROCEDURE: 9/28/2018     SURGEON: Destiny Mcadams MD    PREOPERATIVE DIAGNOSIS: C50.412    POSTOPERATIVE DIAGNOSIS: Same     OPERATION: Procedure(s):  BREAST LUMPECTOMY AND INTRAOPERATIVE ULTRASOUND GUIDED NEEDLE LOCALIZATION AND IORT    ANESTHESIA: General    ESTIMATED BLOOD LOSS: Minimal    COMPLICATIONS: None. SPECIMENS:   ID Type Source Tests Collected by Time Destination   B : 2 - LEFT BREAST LUMP Tissue Breast SURGICAL PATHOLOGY Destiny Mcadams MD 9/28/2018 1100    C : 3 - ADDITIONAL MARGINS CRANIAL  Tissue Breast SURGICAL PATHOLOGY Destiny Mcadams MD 9/28/2018 1321    D : 4 - ADDITIONAL MARGINS CAUDAL DEEP Tissue Breast SURGICAL PATHOLOGY Destiny Mcadams MD 9/28/2018 1323        DRAINS: HANNAH  The patient tolerated the procedure well.     Electronically signed by Destiny Mcadams MD  on 9/28/2018 at 4:24 PM

## 2018-10-03 ENCOUNTER — OFFICE VISIT (OUTPATIENT)
Dept: SURGERY | Age: 58
End: 2018-10-03

## 2018-10-03 VITALS — HEART RATE: 84 BPM | DIASTOLIC BLOOD PRESSURE: 70 MMHG | SYSTOLIC BLOOD PRESSURE: 138 MMHG

## 2018-10-03 DIAGNOSIS — Z17.0 MALIGNANT NEOPLASM OF OVERLAPPING SITES OF LEFT BREAST IN FEMALE, ESTROGEN RECEPTOR POSITIVE (HCC): Primary | ICD-10-CM

## 2018-10-03 DIAGNOSIS — C50.812 MALIGNANT NEOPLASM OF OVERLAPPING SITES OF LEFT BREAST IN FEMALE, ESTROGEN RECEPTOR POSITIVE (HCC): Primary | ICD-10-CM

## 2018-10-03 DIAGNOSIS — Z98.890 STATUS POST LEFT BREAST LUMPECTOMY: ICD-10-CM

## 2018-10-03 PROCEDURE — 99024 POSTOP FOLLOW-UP VISIT: CPT | Performed by: SURGERY

## 2018-10-10 ENCOUNTER — OFFICE VISIT (OUTPATIENT)
Dept: SURGERY | Age: 58
End: 2018-10-10

## 2018-10-10 VITALS
SYSTOLIC BLOOD PRESSURE: 130 MMHG | BODY MASS INDEX: 25.52 KG/M2 | DIASTOLIC BLOOD PRESSURE: 70 MMHG | WEIGHT: 144 LBS | HEIGHT: 63 IN | HEART RATE: 80 BPM

## 2018-10-10 DIAGNOSIS — Z98.890 STATUS POST LEFT BREAST LUMPECTOMY: Primary | ICD-10-CM

## 2018-10-10 PROCEDURE — 99024 POSTOP FOLLOW-UP VISIT: CPT | Performed by: PHYSICIAN ASSISTANT

## 2018-10-26 ENCOUNTER — OFFICE VISIT (OUTPATIENT)
Dept: SURGERY | Age: 58
End: 2018-10-26

## 2018-10-26 VITALS — HEIGHT: 63 IN | BODY MASS INDEX: 25.69 KG/M2 | WEIGHT: 145 LBS

## 2018-10-26 DIAGNOSIS — Z98.890 STATUS POST LEFT BREAST LUMPECTOMY: Primary | ICD-10-CM

## 2018-10-26 PROCEDURE — 99024 POSTOP FOLLOW-UP VISIT: CPT | Performed by: PHYSICIAN ASSISTANT

## 2018-10-30 NOTE — PROGRESS NOTES
HISTORY OF PRESENT ILLNESS:    Ms. Deya García  is recently status post ultrasound guided breast biopsy  on the left which revealed a 1.1  cm low grade  invasive mammary carcinoma. ER is positive at 99%. KS is positive at 21%. Her-2 is negative by IHC. Ki67 is 7% and low    MRI 8/20/2018     Impression   1. Postbiopsy changes in the left breast are consistent with the   biopsy-proven invasive breast cancer. 2. No additional sites of suspicious enhancement are identified in   bilateral breasts. 3. No MRI evidence of lymphadenopathy       She is now status post left lumpectomy and IORT on 9/28/2018  There was a failure of the NeoProbe and we were unable to get a sentinel node. After discussion with her today and with Dr. Michael Giron we will attempt rebiopsy of the sentinel node in about a month. PATHOLOGY REVEALS:  FINAL DIAGNOSIS:       A. 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 (pT1b, pNX). Tumor measures 0.9cm in greatest dimension. Tumor is 0.2cm from closest inked margin (anterior). Margins of excision are negative.       B. Left breast, additional margin cranial, excision:       Benign breast parenchyma.       Negative for malignancy.       C. Left breast, additional caudal deep margin, excision:       Benign breast parenchyma.       Negative for malignancy. PHYSICAL EXAM:  The  wounds look good with no evidence of infection, fluid accumulation, or skin necrosis. .    IMPRESSION:    Doing well s/p left lumpectomy with IORT on 9/28/2018     PLAN:  Schedule her sentinel node biopsy in the next week or so      I spent over 50% of this visit counseling patient. 30 minutes of face to face time with patient.

## 2018-10-31 ENCOUNTER — OFFICE VISIT (OUTPATIENT)
Dept: SURGERY | Age: 58
End: 2018-10-31

## 2018-10-31 VITALS — DIASTOLIC BLOOD PRESSURE: 70 MMHG | SYSTOLIC BLOOD PRESSURE: 138 MMHG | HEART RATE: 72 BPM

## 2018-10-31 DIAGNOSIS — C50.812 MALIGNANT NEOPLASM OF OVERLAPPING SITES OF LEFT BREAST IN FEMALE, ESTROGEN RECEPTOR POSITIVE (HCC): Primary | ICD-10-CM

## 2018-10-31 DIAGNOSIS — Z17.0 MALIGNANT NEOPLASM OF OVERLAPPING SITES OF LEFT BREAST IN FEMALE, ESTROGEN RECEPTOR POSITIVE (HCC): Primary | ICD-10-CM

## 2018-10-31 DIAGNOSIS — Z98.890 STATUS POST LEFT BREAST LUMPECTOMY: ICD-10-CM

## 2018-10-31 PROCEDURE — 99024 POSTOP FOLLOW-UP VISIT: CPT | Performed by: SURGERY

## 2018-11-05 ENCOUNTER — ANESTHESIA EVENT (OUTPATIENT)
Dept: OPERATING ROOM | Age: 58
End: 2018-11-05
Payer: COMMERCIAL

## 2018-11-06 ENCOUNTER — APPOINTMENT (OUTPATIENT)
Dept: NUCLEAR MEDICINE | Age: 58
End: 2018-11-06
Payer: COMMERCIAL

## 2018-11-06 ENCOUNTER — ANESTHESIA (OUTPATIENT)
Dept: OPERATING ROOM | Age: 58
End: 2018-11-06
Payer: COMMERCIAL

## 2018-11-06 ENCOUNTER — HOSPITAL ENCOUNTER (OUTPATIENT)
Age: 58
Setting detail: OUTPATIENT SURGERY
Discharge: HOME | End: 2018-11-06
Attending: SURGERY | Admitting: SURGERY
Payer: COMMERCIAL

## 2018-11-06 VITALS
RESPIRATION RATE: 10 BRPM | DIASTOLIC BLOOD PRESSURE: 46 MMHG | OXYGEN SATURATION: 99 % | SYSTOLIC BLOOD PRESSURE: 104 MMHG

## 2018-11-06 VITALS
HEIGHT: 63 IN | RESPIRATION RATE: 18 BRPM | TEMPERATURE: 98.1 F | DIASTOLIC BLOOD PRESSURE: 74 MMHG | WEIGHT: 144 LBS | HEART RATE: 72 BPM | BODY MASS INDEX: 25.52 KG/M2 | SYSTOLIC BLOOD PRESSURE: 151 MMHG | OXYGEN SATURATION: 100 %

## 2018-11-06 LAB
GLUCOSE BLD-MCNC: 117 MG/DL (ref 70–99)
PERFORMED ON: ABNORMAL

## 2018-11-06 PROCEDURE — 3600000014 HC SURGERY LEVEL 4 ADDTL 15MIN: Performed by: SURGERY

## 2018-11-06 PROCEDURE — 7100000001 HC PACU RECOVERY - ADDTL 15 MIN: Performed by: SURGERY

## 2018-11-06 PROCEDURE — 6360000002 HC RX W HCPCS: Performed by: SURGERY

## 2018-11-06 PROCEDURE — A9541 TC99M SULFUR COLLOID: HCPCS | Performed by: SURGERY

## 2018-11-06 PROCEDURE — 6360000002 HC RX W HCPCS: Performed by: NURSE ANESTHETIST, CERTIFIED REGISTERED

## 2018-11-06 PROCEDURE — 38900 IO MAP OF SENT LYMPH NODE: CPT | Performed by: SURGERY

## 2018-11-06 PROCEDURE — 7100000011 HC PHASE II RECOVERY - ADDTL 15 MIN: Performed by: SURGERY

## 2018-11-06 PROCEDURE — 3700000001 HC ADD 15 MINUTES (ANESTHESIA): Performed by: SURGERY

## 2018-11-06 PROCEDURE — 2709999900 HC NON-CHARGEABLE SUPPLY: Performed by: SURGERY

## 2018-11-06 PROCEDURE — 3700000000 HC ANESTHESIA ATTENDED CARE: Performed by: SURGERY

## 2018-11-06 PROCEDURE — 3600000004 HC SURGERY LEVEL 4 BASE: Performed by: SURGERY

## 2018-11-06 PROCEDURE — 38525 BIOPSY/REMOVAL LYMPH NODES: CPT | Performed by: SURGERY

## 2018-11-06 PROCEDURE — 2500000003 HC RX 250 WO HCPCS: Performed by: SURGERY

## 2018-11-06 PROCEDURE — 88307 TISSUE EXAM BY PATHOLOGIST: CPT

## 2018-11-06 PROCEDURE — 2580000003 HC RX 258: Performed by: SURGERY

## 2018-11-06 PROCEDURE — 7100000010 HC PHASE II RECOVERY - FIRST 15 MIN: Performed by: SURGERY

## 2018-11-06 PROCEDURE — 82948 REAGENT STRIP/BLOOD GLUCOSE: CPT

## 2018-11-06 PROCEDURE — 7100000000 HC PACU RECOVERY - FIRST 15 MIN: Performed by: SURGERY

## 2018-11-06 PROCEDURE — 3430000000 HC RX DIAGNOSTIC RADIOPHARMACEUTICAL: Performed by: SURGERY

## 2018-11-06 PROCEDURE — A9520 TC99 TILMANOCEPT DIAG 0.5MCI: HCPCS | Performed by: SURGERY

## 2018-11-06 PROCEDURE — 2500000003 HC RX 250 WO HCPCS: Performed by: NURSE ANESTHETIST, CERTIFIED REGISTERED

## 2018-11-06 PROCEDURE — 38792 RA TRACER ID OF SENTINL NODE: CPT

## 2018-11-06 RX ORDER — LIDOCAINE HYDROCHLORIDE 10 MG/ML
1 INJECTION, SOLUTION EPIDURAL; INFILTRATION; INTRACAUDAL; PERINEURAL ONCE
Status: COMPLETED | OUTPATIENT
Start: 2018-11-06 | End: 2018-11-06

## 2018-11-06 RX ORDER — DIPHENHYDRAMINE HYDROCHLORIDE 50 MG/ML
12.5 INJECTION INTRAMUSCULAR; INTRAVENOUS
Status: DISCONTINUED | OUTPATIENT
Start: 2018-11-06 | End: 2018-11-06 | Stop reason: HOSPADM

## 2018-11-06 RX ORDER — CEFAZOLIN SODIUM 1 G/3ML
INJECTION, POWDER, FOR SOLUTION INTRAMUSCULAR; INTRAVENOUS PRN
Status: DISCONTINUED | OUTPATIENT
Start: 2018-11-06 | End: 2018-11-06 | Stop reason: SDUPTHER

## 2018-11-06 RX ORDER — ONDANSETRON 2 MG/ML
INJECTION INTRAMUSCULAR; INTRAVENOUS PRN
Status: DISCONTINUED | OUTPATIENT
Start: 2018-11-06 | End: 2018-11-06 | Stop reason: SDUPTHER

## 2018-11-06 RX ORDER — PROMETHAZINE HYDROCHLORIDE 25 MG/ML
6.25 INJECTION, SOLUTION INTRAMUSCULAR; INTRAVENOUS
Status: DISCONTINUED | OUTPATIENT
Start: 2018-11-06 | End: 2018-11-06 | Stop reason: HOSPADM

## 2018-11-06 RX ORDER — HYDRALAZINE HYDROCHLORIDE 20 MG/ML
5 INJECTION INTRAMUSCULAR; INTRAVENOUS EVERY 10 MIN PRN
Status: DISCONTINUED | OUTPATIENT
Start: 2018-11-06 | End: 2018-11-06 | Stop reason: HOSPADM

## 2018-11-06 RX ORDER — MORPHINE SULFATE/0.9% NACL/PF 1 MG/ML
4 SYRINGE (ML) INJECTION EVERY 5 MIN PRN
Status: DISCONTINUED | OUTPATIENT
Start: 2018-11-06 | End: 2018-11-06 | Stop reason: HOSPADM

## 2018-11-06 RX ORDER — LABETALOL HYDROCHLORIDE 5 MG/ML
5 INJECTION, SOLUTION INTRAVENOUS EVERY 10 MIN PRN
Status: DISCONTINUED | OUTPATIENT
Start: 2018-11-06 | End: 2018-11-06 | Stop reason: HOSPADM

## 2018-11-06 RX ORDER — MIDAZOLAM HYDROCHLORIDE 1 MG/ML
INJECTION INTRAMUSCULAR; INTRAVENOUS PRN
Status: DISCONTINUED | OUTPATIENT
Start: 2018-11-06 | End: 2018-11-06 | Stop reason: SDUPTHER

## 2018-11-06 RX ORDER — MONTELUKAST SODIUM 10 MG/1
10 TABLET ORAL DAILY
COMMUNITY
End: 2019-01-09 | Stop reason: SDUPTHER

## 2018-11-06 RX ORDER — MEPERIDINE HYDROCHLORIDE 50 MG/ML
12.5 INJECTION INTRAMUSCULAR; INTRAVENOUS; SUBCUTANEOUS EVERY 5 MIN PRN
Status: DISCONTINUED | OUTPATIENT
Start: 2018-11-06 | End: 2018-11-06 | Stop reason: HOSPADM

## 2018-11-06 RX ORDER — LIDOCAINE HYDROCHLORIDE 10 MG/ML
INJECTION, SOLUTION INFILTRATION; PERINEURAL PRN
Status: DISCONTINUED | OUTPATIENT
Start: 2018-11-06 | End: 2018-11-06 | Stop reason: SDUPTHER

## 2018-11-06 RX ORDER — ENALAPRILAT 2.5 MG/2ML
1.25 INJECTION INTRAVENOUS
Status: DISCONTINUED | OUTPATIENT
Start: 2018-11-06 | End: 2018-11-06 | Stop reason: HOSPADM

## 2018-11-06 RX ORDER — PROPOFOL 10 MG/ML
INJECTION, EMULSION INTRAVENOUS PRN
Status: DISCONTINUED | OUTPATIENT
Start: 2018-11-06 | End: 2018-11-06 | Stop reason: SDUPTHER

## 2018-11-06 RX ORDER — DEXAMETHASONE SODIUM PHOSPHATE 10 MG/ML
INJECTION INTRAMUSCULAR; INTRAVENOUS PRN
Status: DISCONTINUED | OUTPATIENT
Start: 2018-11-06 | End: 2018-11-06 | Stop reason: SDUPTHER

## 2018-11-06 RX ORDER — ISOSULFAN BLUE 50 MG/5ML
INJECTION, SOLUTION SUBCUTANEOUS PRN
Status: DISCONTINUED | OUTPATIENT
Start: 2018-11-06 | End: 2018-11-06 | Stop reason: HOSPADM

## 2018-11-06 RX ORDER — SODIUM CHLORIDE, SODIUM LACTATE, POTASSIUM CHLORIDE, CALCIUM CHLORIDE 600; 310; 30; 20 MG/100ML; MG/100ML; MG/100ML; MG/100ML
INJECTION, SOLUTION INTRAVENOUS CONTINUOUS
Status: DISCONTINUED | OUTPATIENT
Start: 2018-11-06 | End: 2018-11-06 | Stop reason: HOSPADM

## 2018-11-06 RX ORDER — FENTANYL CITRATE 50 UG/ML
INJECTION, SOLUTION INTRAMUSCULAR; INTRAVENOUS PRN
Status: DISCONTINUED | OUTPATIENT
Start: 2018-11-06 | End: 2018-11-06 | Stop reason: SDUPTHER

## 2018-11-06 RX ORDER — METOCLOPRAMIDE HYDROCHLORIDE 5 MG/ML
10 INJECTION INTRAMUSCULAR; INTRAVENOUS
Status: DISCONTINUED | OUTPATIENT
Start: 2018-11-06 | End: 2018-11-06 | Stop reason: HOSPADM

## 2018-11-06 RX ORDER — MORPHINE SULFATE/0.9% NACL/PF 1 MG/ML
2 SYRINGE (ML) INJECTION EVERY 5 MIN PRN
Status: DISCONTINUED | OUTPATIENT
Start: 2018-11-06 | End: 2018-11-06 | Stop reason: HOSPADM

## 2018-11-06 RX ADMIN — TILMANOCEPT 0.5 MILLICURIE: KIT at 08:54

## 2018-11-06 RX ADMIN — Medication 500 MICRO CURIE: at 10:21

## 2018-11-06 RX ADMIN — CEFAZOLIN 1000 MG: 330 INJECTION, POWDER, FOR SOLUTION INTRAMUSCULAR; INTRAVENOUS at 10:36

## 2018-11-06 RX ADMIN — ONDANSETRON HYDROCHLORIDE 4 MG: 2 INJECTION, SOLUTION INTRAMUSCULAR; INTRAVENOUS at 10:27

## 2018-11-06 RX ADMIN — FENTANYL CITRATE 25 MCG: 50 INJECTION INTRAMUSCULAR; INTRAVENOUS at 10:47

## 2018-11-06 RX ADMIN — MIDAZOLAM 2 MG: 1 INJECTION INTRAMUSCULAR; INTRAVENOUS at 10:13

## 2018-11-06 RX ADMIN — SODIUM CHLORIDE, POTASSIUM CHLORIDE, SODIUM LACTATE AND CALCIUM CHLORIDE: 600; 310; 30; 20 INJECTION, SOLUTION INTRAVENOUS at 08:42

## 2018-11-06 RX ADMIN — PROPOFOL 150 MG: 10 INJECTION, EMULSION INTRAVENOUS at 10:19

## 2018-11-06 RX ADMIN — DEXAMETHASONE SODIUM PHOSPHATE 10 MG: 10 INJECTION INTRAMUSCULAR; INTRAVENOUS at 10:27

## 2018-11-06 RX ADMIN — LIDOCAINE HYDROCHLORIDE 1 ML: 10 INJECTION, SOLUTION EPIDURAL; INFILTRATION; INTRACAUDAL; PERINEURAL at 08:42

## 2018-11-06 RX ADMIN — FENTANYL CITRATE 50 MCG: 50 INJECTION INTRAMUSCULAR; INTRAVENOUS at 10:28

## 2018-11-06 RX ADMIN — LIDOCAINE HYDROCHLORIDE 5 ML: 10 INJECTION, SOLUTION INFILTRATION; PERINEURAL at 10:19

## 2018-11-06 ASSESSMENT — PAIN - FUNCTIONAL ASSESSMENT: PAIN_FUNCTIONAL_ASSESSMENT: 0-10

## 2018-11-06 ASSESSMENT — PAIN SCALES - GENERAL
PAINLEVEL_OUTOF10: 1

## 2018-11-06 ASSESSMENT — LIFESTYLE VARIABLES: SMOKING_STATUS: 0

## 2018-11-06 NOTE — ANESTHESIA PRE PROCEDURE
Department of Anesthesiology  Preprocedure Note       Name:  Dee Person   Age:  59 y.o.  :  1960                                          MRN:  [id number]         Date:  2018      Surgeon: Yash Rendon):  Zeyad Keith MD    Procedure: Procedure(s):  Breast biopsy sentinel node (left breast)    Medications prior to admission:   Prior to Admission medications    Medication Sig Start Date End Date Taking? Authorizing Provider   montelukast (SINGULAIR) 10 MG tablet Take 10 mg by mouth daily    Historical Provider, MD   atorvastatin (LIPITOR) 40 MG tablet Take 1 tablet by mouth daily 18   Graylon Saint, PA   irbesartan (AVAPRO) 75 MG tablet 1/2 tablet daily 18   Graylon Saint, PA   Liraglutide (VICTOZA) 18 MG/3ML SOPN SC injection Inject 1.2 mg into the skin daily 18   Graylon Saint, PA   metFORMIN (GLUCOPHAGE-XR) 750 MG extended release tablet Take 1 tablet by mouth daily (with breakfast) 18   Graylon Saint, PA   blood glucose monitor kit and supplies 1 kit by Other route daily Test 1 times a day & as needed for symptoms of irregular blood glucose. 18   Graylon Saint, PA   blood glucose monitor strips Test blood sugars daily. 18   Graylon Saint, PA   BD PEN NEEDLE PRIYANKA U/F 32G X 4 MM MISC USE AS DIRECTED WITH VICTOZA 10/12/17   Sally Terrell MD   Multiple Minerals-Vitamins (CITRACAL PLUS PO) Take 2 tablets by mouth 2 times daily    Historical Provider, MD   Omega-3 Fatty Acids (FISH OIL) 1000 MG CAPS Take 3,000 mg by mouth daily    Historical Provider, MD   folic acid (FOLVITE) 1 MG tablet Take 1 mg by mouth daily    Historical Provider, MD   Ascorbic Acid (VITAMIN C) 500 MG tablet Take 500 mg by mouth daily    Historical Provider, MD       Current medications:    No current facility-administered medications for this visit. No current outpatient prescriptions on file.      Facility-Administered Medications Ordered in Other Visits   Medication Dose Route Frequency

## 2018-11-06 NOTE — H&P
including what is known about the causes of breast cancer, its relationship to fibrocystic disease, its relationship to hormone replacement therapy, and some of the genetic aspects involved in familial breast cancers.  We discussed the BrCa genetic analysis and why it is  appropriate for her.  We discussed breast MRI and how it assists in evaluation of breast cancers and the results of her MRI if done.       We discussed the surgical options including simple mastectomy and lumpectomy with  sentinel lymph node biopsy as well as the possibility of axillary lymph node dissection.  We explained in depth why breast conservation therapy requires radiation treatments for the majority of women.   These treatments may be external beam for 6 weeks, partial breast for 5 days,  or intraoperative.   I explained that most women treated for invasive malignancy do receive systemic therapy, hormonal therapy or  chemotherapy postoperatively depending upon the final pathology, the lymph node status, and the hormone receptor status.  I discussed Oncotype Dx, Mammoprint, and Adjuvant Online as tools which aid in the decision for chemotherapy or hormonal therapy.  We also discussed the possibility of breast reconstruction if a mastectomy was required.  .  I explained to her the different techniques including placement of a subpectoral implant with a Alloderm sling  versus TRAM flap reconstruction as welll as other methods of reconstruction.  She does not wish to pursue reconstruction at this time.         After a prolonged discussion lasting 90 minutes  we felt it was most appropriate that she undergo left lumpectomy, and sentinel node biopsy, with preop ultrasound and and intraop ultrasound guided needle localization with IORT             We discussed the risks and benefits of the surgery including but not limited to bleeding, infection, pain, lymphedema, numbness, and also the risks of general anesthesia including pneumonia, blood clots, heart attack, stroke, and death.  She expresses good understanding and is agreeable to proceed with surgery.       We will schedule this to be done when [facility].      At that surgery there was mechanical failure of the [place] node gamma probe and she returns for sentinel node biopsy.

## 2018-11-07 NOTE — OP NOTE
for  hemostasis. We also injected once she was asleep 3 mL of isosulfan blue  to facilitate axillary reverse mapping. We then completed our excision  and reviewed the specimen pathology. We had clearly 2 lymph nodes. We  did use gamma probe and the specimen was hot at about 30 to 40 counts  and there was no significant residual radiation in the axilla. We then closed with 2-0 and 3-0 Vicryl and 4-0 Monocryl for the skin. Estimated blood loss, minimal.  Complications, none. She tolerated all  of this quite well.         Rama Butler MD    D: 11/06/2018 13:04:50      T: 11/06/2018 19:15:52     [id number]  Job#: [id number]     Doc#: [id number]    CC:

## 2018-11-14 ENCOUNTER — OFFICE VISIT (OUTPATIENT)
Dept: SURGERY | Age: 58
End: 2018-11-14

## 2018-11-14 VITALS — SYSTOLIC BLOOD PRESSURE: 110 MMHG | HEART RATE: 72 BPM | DIASTOLIC BLOOD PRESSURE: 60 MMHG

## 2018-11-14 DIAGNOSIS — Z17.0 MALIGNANT NEOPLASM OF OVERLAPPING SITES OF LEFT BREAST IN FEMALE, ESTROGEN RECEPTOR POSITIVE (HCC): Primary | ICD-10-CM

## 2018-11-14 DIAGNOSIS — C50.812 MALIGNANT NEOPLASM OF OVERLAPPING SITES OF LEFT BREAST IN FEMALE, ESTROGEN RECEPTOR POSITIVE (HCC): Primary | ICD-10-CM

## 2018-11-14 DIAGNOSIS — Z98.890 STATUS POST LEFT BREAST LUMPECTOMY: ICD-10-CM

## 2018-11-14 PROCEDURE — 99024 POSTOP FOLLOW-UP VISIT: CPT | Performed by: SURGERY

## 2018-11-15 ENCOUNTER — TELEPHONE (OUTPATIENT)
Dept: SURGERY | Age: 58
End: 2018-11-15

## 2019-01-09 ENCOUNTER — OFFICE VISIT (OUTPATIENT)
Dept: INTERNAL MEDICINE | Age: 59
End: 2019-01-09
Payer: COMMERCIAL

## 2019-01-09 VITALS
BODY MASS INDEX: 26.22 KG/M2 | HEIGHT: 63 IN | HEART RATE: 94 BPM | WEIGHT: 148 LBS | SYSTOLIC BLOOD PRESSURE: 136 MMHG | DIASTOLIC BLOOD PRESSURE: 74 MMHG | OXYGEN SATURATION: 98 %

## 2019-01-09 DIAGNOSIS — E11.9 TYPE 2 DIABETES MELLITUS WITHOUT COMPLICATION, WITHOUT LONG-TERM CURRENT USE OF INSULIN (HCC): ICD-10-CM

## 2019-01-09 DIAGNOSIS — R30.0 DYSURIA: ICD-10-CM

## 2019-01-09 DIAGNOSIS — I10 ESSENTIAL HYPERTENSION: ICD-10-CM

## 2019-01-09 DIAGNOSIS — N30.01 ACUTE CYSTITIS WITH HEMATURIA: Primary | ICD-10-CM

## 2019-01-09 DIAGNOSIS — E78.2 MIXED HYPERLIPIDEMIA: ICD-10-CM

## 2019-01-09 LAB
ALBUMIN SERPL-MCNC: 4.5 G/DL (ref 3.5–5.2)
ALP BLD-CCNC: 117 U/L (ref 35–104)
ALT SERPL-CCNC: 36 U/L (ref 5–33)
ANION GAP SERPL CALCULATED.3IONS-SCNC: 16 MMOL/L (ref 7–19)
APPEARANCE FLUID: CLEAR
AST SERPL-CCNC: 30 U/L (ref 5–32)
BASOPHILS ABSOLUTE: 0 K/UL (ref 0–0.2)
BASOPHILS RELATIVE PERCENT: 0.5 % (ref 0–1)
BILIRUB SERPL-MCNC: 0.7 MG/DL (ref 0.2–1.2)
BILIRUBIN, POC: NORMAL
BLOOD URINE, POC: NORMAL
BUN BLDV-MCNC: 12 MG/DL (ref 8–23)
CALCIUM SERPL-MCNC: 10.5 MG/DL (ref 8.8–10.2)
CHLORIDE BLD-SCNC: 106 MMOL/L (ref 98–111)
CHOLESTEROL, TOTAL: 149 MG/DL (ref 160–199)
CLARITY, POC: CLEAR
CO2: 23 MMOL/L (ref 22–29)
COLOR, POC: YELLOW
CREAT SERPL-MCNC: 0.5 MG/DL (ref 0.5–0.9)
EOSINOPHILS ABSOLUTE: 0.2 K/UL (ref 0–0.6)
EOSINOPHILS RELATIVE PERCENT: 2.3 % (ref 0–5)
GFR NON-AFRICAN AMERICAN: >60
GLUCOSE BLD-MCNC: 139 MG/DL (ref 74–109)
GLUCOSE URINE, POC: NORMAL
HBA1C MFR BLD: 7.4 % (ref 4–6)
HCT VFR BLD CALC: 44.8 % (ref 37–47)
HDLC SERPL-MCNC: 52 MG/DL (ref 65–121)
HEMOGLOBIN: 14.4 G/DL (ref 12–16)
KETONES, POC: NORMAL
LDL CHOLESTEROL CALCULATED: 72 MG/DL
LEUKOCYTE EST, POC: NORMAL
LYMPHOCYTES ABSOLUTE: 2.4 K/UL (ref 1.1–4.5)
LYMPHOCYTES RELATIVE PERCENT: 32.4 % (ref 20–40)
MCH RBC QN AUTO: 30.5 PG (ref 27–31)
MCHC RBC AUTO-ENTMCNC: 32.1 G/DL (ref 33–37)
MCV RBC AUTO: 94.9 FL (ref 81–99)
MONOCYTES ABSOLUTE: 0.4 K/UL (ref 0–0.9)
MONOCYTES RELATIVE PERCENT: 6 % (ref 0–10)
NEUTROPHILS ABSOLUTE: 4.3 K/UL (ref 1.5–7.5)
NEUTROPHILS RELATIVE PERCENT: 58.5 % (ref 50–65)
NITRITE, POC: NORMAL
PDW BLD-RTO: 12.3 % (ref 11.5–14.5)
PH, POC: 6.5
PLATELET # BLD: 246 K/UL (ref 130–400)
PMV BLD AUTO: 10.1 FL (ref 9.4–12.3)
POTASSIUM SERPL-SCNC: 4.5 MMOL/L (ref 3.5–5)
PROTEIN, POC: NORMAL
RBC # BLD: 4.72 M/UL (ref 4.2–5.4)
SODIUM BLD-SCNC: 145 MMOL/L (ref 136–145)
SPECIFIC GRAVITY, POC: 1.01
TOTAL PROTEIN: 8 G/DL (ref 6.6–8.7)
TRIGL SERPL-MCNC: 125 MG/DL (ref 0–149)
TSH SERPL DL<=0.05 MIU/L-ACNC: 0.91 UIU/ML (ref 0.27–4.2)
UROBILINOGEN, POC: 0.2
WBC # BLD: 7.3 K/UL (ref 4.8–10.8)

## 2019-01-09 PROCEDURE — 81002 URINALYSIS NONAUTO W/O SCOPE: CPT | Performed by: NURSE PRACTITIONER

## 2019-01-09 PROCEDURE — 99396 PREV VISIT EST AGE 40-64: CPT | Performed by: NURSE PRACTITIONER

## 2019-01-09 RX ORDER — ATORVASTATIN CALCIUM 40 MG/1
40 TABLET, FILM COATED ORAL DAILY
Qty: 90 TABLET | Refills: 3 | Status: SHIPPED | OUTPATIENT
Start: 2019-01-09 | End: 2019-10-25 | Stop reason: SDUPTHER

## 2019-01-09 RX ORDER — IRBESARTAN 75 MG/1
TABLET ORAL
Qty: 45 TABLET | Refills: 3 | Status: SHIPPED | OUTPATIENT
Start: 2019-01-09 | End: 2019-04-15 | Stop reason: SDUPTHER

## 2019-01-09 RX ORDER — NITROFURANTOIN 25; 75 MG/1; MG/1
100 CAPSULE ORAL 2 TIMES DAILY
Qty: 20 CAPSULE | Refills: 0 | Status: SHIPPED | OUTPATIENT
Start: 2019-01-09 | End: 2019-01-19

## 2019-01-09 RX ORDER — MONTELUKAST SODIUM 10 MG/1
10 TABLET ORAL DAILY
Qty: 90 TABLET | Refills: 3 | Status: SHIPPED | OUTPATIENT
Start: 2019-01-09 | End: 2019-12-06 | Stop reason: SDUPTHER

## 2019-01-09 RX ORDER — METFORMIN HYDROCHLORIDE 750 MG/1
750 TABLET, EXTENDED RELEASE ORAL
Qty: 90 TABLET | Refills: 3 | Status: SHIPPED | OUTPATIENT
Start: 2019-01-09 | End: 2020-01-14

## 2019-01-09 RX ORDER — LETROZOLE 2.5 MG/1
2.5 TABLET, FILM COATED ORAL DAILY
Refills: 3 | COMMUNITY
Start: 2018-11-27 | End: 2019-04-03 | Stop reason: SDUPTHER

## 2019-01-09 ASSESSMENT — ENCOUNTER SYMPTOMS
COUGH: 0
SHORTNESS OF BREATH: 0
COLOR CHANGE: 0
BACK PAIN: 0
NAUSEA: 0
RHINORRHEA: 0
PHOTOPHOBIA: 0
VOICE CHANGE: 0
VOMITING: 0

## 2019-01-09 ASSESSMENT — PATIENT HEALTH QUESTIONNAIRE - PHQ9
SUM OF ALL RESPONSES TO PHQ9 QUESTIONS 1 & 2: 0
SUM OF ALL RESPONSES TO PHQ QUESTIONS 1-9: 0
1. LITTLE INTEREST OR PLEASURE IN DOING THINGS: 0
2. FEELING DOWN, DEPRESSED OR HOPELESS: 0
SUM OF ALL RESPONSES TO PHQ QUESTIONS 1-9: 0

## 2019-01-11 LAB — URINE CULTURE, ROUTINE: NORMAL

## 2019-01-25 ENCOUNTER — TELEPHONE (OUTPATIENT)
Dept: SURGERY | Age: 59
End: 2019-01-25

## 2019-01-30 ENCOUNTER — OFFICE VISIT (OUTPATIENT)
Dept: SURGERY | Age: 59
End: 2019-01-30

## 2019-01-30 VITALS — SYSTOLIC BLOOD PRESSURE: 130 MMHG | DIASTOLIC BLOOD PRESSURE: 78 MMHG | HEART RATE: 76 BPM

## 2019-01-30 DIAGNOSIS — Z98.890 STATUS POST LEFT BREAST LUMPECTOMY: ICD-10-CM

## 2019-01-30 DIAGNOSIS — C50.812 MALIGNANT NEOPLASM OF OVERLAPPING SITES OF LEFT BREAST IN FEMALE, ESTROGEN RECEPTOR POSITIVE (HCC): Primary | ICD-10-CM

## 2019-01-30 DIAGNOSIS — Z17.0 MALIGNANT NEOPLASM OF OVERLAPPING SITES OF LEFT BREAST IN FEMALE, ESTROGEN RECEPTOR POSITIVE (HCC): Primary | ICD-10-CM

## 2019-01-30 PROCEDURE — 99024 POSTOP FOLLOW-UP VISIT: CPT | Performed by: SURGERY

## 2019-02-04 ENCOUNTER — TELEPHONE (OUTPATIENT)
Dept: SURGERY | Age: 59
End: 2019-02-04

## 2019-02-05 ENCOUNTER — TELEPHONE (OUTPATIENT)
Dept: SURGERY | Age: 59
End: 2019-02-05

## 2019-02-13 RX ORDER — PEN NEEDLE, DIABETIC 32GX 5/32"
NEEDLE, DISPOSABLE MISCELLANEOUS
Qty: 100 EACH | Refills: 5 | Status: SHIPPED | OUTPATIENT
Start: 2019-02-13

## 2019-04-03 ENCOUNTER — TELEPHONE (OUTPATIENT)
Dept: SURGERY | Age: 59
End: 2019-04-03

## 2019-04-03 RX ORDER — LETROZOLE 2.5 MG/1
2.5 TABLET, FILM COATED ORAL DAILY
Qty: 90 TABLET | Refills: 3 | Status: SHIPPED | OUTPATIENT
Start: 2019-04-03 | End: 2019-11-22 | Stop reason: SDUPTHER

## 2019-04-03 NOTE — TELEPHONE ENCOUNTER
Patients appointments have been rescheduled and also a 90 day supply of Letrozole was sent to her Mail in pharmacy   All information was confirmed with patient

## 2019-04-03 NOTE — TELEPHONE ENCOUNTER
Patient called in stating she was originally scheduled for mammo on 3/12 and f/u with Dr. Rush Kingsley on 3/18, however she fell 5 weeks ago and hurt shoulder, goes to Dr. Brooklynn Cortes tomorrow to see how healing, she is wanting to schedule her mammogram and needs to discuss her medication Letrozole, please call her to discuss these issues.     CC: Adri Coker

## 2019-04-15 DIAGNOSIS — I10 ESSENTIAL HYPERTENSION: ICD-10-CM

## 2019-04-15 RX ORDER — IRBESARTAN 75 MG/1
TABLET ORAL
Qty: 15 TABLET | Refills: 0 | Status: SHIPPED | OUTPATIENT
Start: 2019-04-15 | End: 2019-05-16 | Stop reason: SDUPTHER

## 2019-04-15 RX ORDER — IRBESARTAN 75 MG/1
TABLET ORAL
Qty: 45 TABLET | Refills: 3 | Status: SHIPPED | OUTPATIENT
Start: 2019-04-15 | End: 2019-04-15 | Stop reason: SDUPTHER

## 2019-04-15 NOTE — TELEPHONE ENCOUNTER
Patient came in to office requesting a refill be sent to her mail order and local pharmacy because she was completely out of medication.

## 2019-04-24 NOTE — PROGRESS NOTES
HISTORY OF PRESENT ILLNESS:    Ms. Leanna Wynn  is status post ultrasound guided breast biopsy  on the left which revealed a 1.1  cm low grade  invasive mammary carcinoma. ER is positive at 99%. NC is positive at 21%. Her-2 is negative by IHC. Ki67 is 7% and low    MRI 8/20/2018     Impression   1. Postbiopsy changes in the left breast are consistent with the   biopsy-proven invasive breast cancer. 2. No additional sites of suspicious enhancement are identified in   bilateral breasts. 3. No MRI evidence of lymphadenopathy       She is now status post left lumpectomy and IORT on 9/28/2018  There was a failure of the NeoProbe and we were unable to get a sentinel node. After discussion with her and with Dr. Aydee Llanos we attempted rebiopsy of the sentinel node     PATHOLOGY REVEALS:  FINAL DIAGNOSIS:       A. 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 (pT1b, pNX). Tumor measures 0.9cm in greatest dimension. Tumor is 0.2cm from closest inked margin (anterior). Margins of excision are negative.       B. Left breast, additional margin cranial, excision:       Benign breast parenchyma.       Negative for malignancy.       C. Left breast, additional caudal deep margin, excision:       Benign breast parenchyma.       Negative for malignancy. She underwent sentinel node biopsy on 57/7/2585 without complications. She now comes in for follow-up. FINAL DIAGNOSIS:    Lymph nodes, left axilla, sentinel lymph node biopsy: Five lymph nodes  negative for metastatic carcinoma.     She has no new complaints today. She denies weight loss or any other systemic symptoms.      Mammogram-4/25/2019  Narrative   EXAMINATION: [facility] DIGITAL DIAGNOSTIC UNILATERAL LEFT 4/25/2019 4:00 PM   HISTORY: 59-year-old female with a personal history of left breast   carcinoma, previous lumpectomy and radiation therapy in September, 2018. Report: Today's examination consists of craniocaudal, oblique and   exaggerated craniocaudal views of the left breast with 3-D claudy   synthesis, a focal spot compression view of the left lateral breast   was also obtained in the craniocaudal dimension with 3-D claudy   synthesis. The true lateral view of the lumpectomy bed was obtained. Ultrasound of left breast was also performed today. Comparison is made   with the unilateral left mammograms on 7/24/2018, bilateral screening   mammograms 7/18/2018 and diagnostic mammograms on 1/10/2018. The lumpectomy bed is very posterior in the lateral breast and   somewhat difficult to image, however no mass or distortion is seen in   that region. There is a small area of potential architectural   distortion anterior to the lumpectomy bed, approximately 4 cm from the   nipple, which only shows partial effacement on spot compression. Ultrasound was performed, showing no mass in that location, though   there is a very subtle area of distortion, possibly scar tissue. Short   interval follow-up is recommended with repeat ultrasound and focal   spot compression views in 6 months.        Impression   1. Post therapeutic changes in the left upper outer quadrant   compatible with previous lumpectomy and radiation therapy. Six-month   follow-up spot compression views and ultrasound are recommended for a   small area of distortion along the anterior margin of the lumpectomy   bed, possibly scar tissue. No mass is seen in this location. Alternatively, MRI could be performed if clinically desired. BI-RADS   Category 3, probably benign. Ultrasound-4/25/2019     EXAMINATION: US BREAST LIMITED LEFT 4/25/2019 3:55 PM   HISTORY: Personal history of left breast cancer, previous lumpectomy   and radiation therapy, with a questionable area of architectural   distortion anterior to the lumpectomy bed on mammograms.    Report: Sonographic images of the left breast were obtained in the   upper outer quadrant about 4 cm from the nipple and are compared with   diagnostic mammograms performed today. There is a vague area of   distortion in this region, along the anterior margin of the lumpectomy   bed, which may represent scar tissue. No mass is identified.       Impression   Small vague area of distortion anterior to the lumpectomy   bed, possibly scar tissue. No mass is seen. This is approximately 4 cm   from the nipple in the left breast at 2:00. Repeat spot compression   mammogram views and ultrasound are recommended in 6 months. Alternatively, MRI of the breasts could be performed if clinically   desired. BI-RADS Category 3, probably benign. PHYSICAL EXAM:  The left lumpectomy incision is looking good. There are fibrocystic changes and appropriate post operative changes. There are no dominant masses, no skin or nipple changes, and no axillary adenopathy. There is nothing suspicious for new carcinoma and no evidence of local tumor recurrence. .    IMPRESSION:    Doing well s/p left lumpectomy with IORT on 9/28/2018     PLAN:  She has started on letrozole  Follow-up with me in 6 months with bilateral mammograms  I spent over 50% of this visit counseling patient. 25 minutes of face to face time with patient.

## 2019-04-25 ENCOUNTER — OFFICE VISIT (OUTPATIENT)
Dept: SURGERY | Age: 59
End: 2019-04-25
Payer: COMMERCIAL

## 2019-04-25 ENCOUNTER — HOSPITAL ENCOUNTER (OUTPATIENT)
Dept: WOMENS IMAGING | Age: 59
Discharge: HOME | End: 2019-04-25
Payer: COMMERCIAL

## 2019-04-25 VITALS — DIASTOLIC BLOOD PRESSURE: 70 MMHG | SYSTOLIC BLOOD PRESSURE: 124 MMHG | HEART RATE: 76 BPM

## 2019-04-25 DIAGNOSIS — Z98.890 STATUS POST LEFT BREAST LUMPECTOMY: Primary | ICD-10-CM

## 2019-04-25 DIAGNOSIS — C50.812 MALIGNANT NEOPLASM OF OVERLAPPING SITES OF LEFT BREAST IN FEMALE, ESTROGEN RECEPTOR POSITIVE (HCC): ICD-10-CM

## 2019-04-25 DIAGNOSIS — Z17.0 MALIGNANT NEOPLASM OF OVERLAPPING SITES OF LEFT BREAST IN FEMALE, ESTROGEN RECEPTOR POSITIVE (HCC): ICD-10-CM

## 2019-04-25 DIAGNOSIS — Z98.890 STATUS POST LEFT BREAST LUMPECTOMY: ICD-10-CM

## 2019-04-25 DIAGNOSIS — R92.8 ABNORMAL MAMMOGRAM: ICD-10-CM

## 2019-04-25 PROCEDURE — 76642 ULTRASOUND BREAST LIMITED: CPT

## 2019-04-25 PROCEDURE — 99213 OFFICE O/P EST LOW 20 MIN: CPT | Performed by: SURGERY

## 2019-04-25 PROCEDURE — G0279 TOMOSYNTHESIS, MAMMO: HCPCS

## 2019-05-11 DIAGNOSIS — I10 ESSENTIAL HYPERTENSION: ICD-10-CM

## 2019-05-13 RX ORDER — IRBESARTAN 75 MG/1
TABLET ORAL
Qty: 15 TABLET | Refills: 5 | OUTPATIENT
Start: 2019-05-13

## 2019-05-14 DIAGNOSIS — Z85.3 PERSONAL HISTORY OF BREAST CANCER: Primary | ICD-10-CM

## 2019-05-16 ENCOUNTER — TELEPHONE (OUTPATIENT)
Dept: INTERNAL MEDICINE | Age: 59
End: 2019-05-16

## 2019-05-16 RX ORDER — IRBESARTAN 75 MG/1
TABLET ORAL
Qty: 15 TABLET | Refills: 0 | Status: SHIPPED | OUTPATIENT
Start: 2019-05-16 | End: 2019-10-25 | Stop reason: SDUPTHER

## 2019-05-16 NOTE — TELEPHONE ENCOUNTER
Spoke with pt, states she is out of this medication because her mail order pharmacy has not had this in stock and it will not get to her house for another five or so days. She wants to know if she just needs to wait until it comes from the mail order pharmacy.

## 2019-05-16 NOTE — TELEPHONE ENCOUNTER
She will have to have the pharmacy transfer the Rx to a pharmacy that has the Rx after she finds one that has the medication, they are likely to be out by the time I fax the medication as many pharmacies are running low on this medication.  I can call her if you would like,Thank You

## 2019-05-16 NOTE — TELEPHONE ENCOUNTER
Spoke with pt. She will call [facility] to ensure they still have some in stock, if so she will call [facility] and have them transfer the Rx.

## 2019-05-29 ENCOUNTER — HOSPITAL ENCOUNTER (OUTPATIENT)
Dept: HOSPITAL 58 - OUTPT | Age: 59
End: 2019-05-29
Attending: OTOLARYNGOLOGY

## 2019-05-29 DIAGNOSIS — H91.90: Primary | ICD-10-CM

## 2019-07-09 ENCOUNTER — OFFICE VISIT (OUTPATIENT)
Dept: INTERNAL MEDICINE | Age: 59
End: 2019-07-09
Payer: COMMERCIAL

## 2019-07-09 VITALS
HEART RATE: 88 BPM | OXYGEN SATURATION: 94 % | BODY MASS INDEX: 25.34 KG/M2 | DIASTOLIC BLOOD PRESSURE: 84 MMHG | HEIGHT: 63 IN | SYSTOLIC BLOOD PRESSURE: 136 MMHG | WEIGHT: 143 LBS

## 2019-07-09 DIAGNOSIS — E11.9 TYPE 2 DIABETES MELLITUS WITHOUT COMPLICATION, WITHOUT LONG-TERM CURRENT USE OF INSULIN (HCC): ICD-10-CM

## 2019-07-09 DIAGNOSIS — Z17.0 MALIGNANT NEOPLASM OF OVERLAPPING SITES OF LEFT BREAST IN FEMALE, ESTROGEN RECEPTOR POSITIVE (HCC): ICD-10-CM

## 2019-07-09 DIAGNOSIS — E78.2 MIXED HYPERLIPIDEMIA: ICD-10-CM

## 2019-07-09 DIAGNOSIS — I10 ESSENTIAL HYPERTENSION: ICD-10-CM

## 2019-07-09 DIAGNOSIS — C50.812 MALIGNANT NEOPLASM OF OVERLAPPING SITES OF LEFT BREAST IN FEMALE, ESTROGEN RECEPTOR POSITIVE (HCC): ICD-10-CM

## 2019-07-09 DIAGNOSIS — S49.91XD INJURY OF RIGHT SHOULDER, SUBSEQUENT ENCOUNTER: ICD-10-CM

## 2019-07-09 DIAGNOSIS — I10 ESSENTIAL HYPERTENSION: Primary | ICD-10-CM

## 2019-07-09 LAB
ALBUMIN SERPL-MCNC: 4.4 G/DL (ref 3.5–5.2)
ALP BLD-CCNC: 130 U/L (ref 35–104)
ALT SERPL-CCNC: 24 U/L (ref 5–33)
ANION GAP SERPL CALCULATED.3IONS-SCNC: 15 MMOL/L (ref 7–19)
AST SERPL-CCNC: 16 U/L (ref 5–32)
BASOPHILS ABSOLUTE: 0 K/UL (ref 0–0.2)
BASOPHILS RELATIVE PERCENT: 0.6 % (ref 0–1)
BILIRUB SERPL-MCNC: 0.5 MG/DL (ref 0.2–1.2)
BUN BLDV-MCNC: 13 MG/DL (ref 8–23)
CALCIUM SERPL-MCNC: 10 MG/DL (ref 8.8–10.2)
CHLORIDE BLD-SCNC: 100 MMOL/L (ref 98–111)
CHOLESTEROL, TOTAL: 163 MG/DL (ref 160–199)
CO2: 27 MMOL/L (ref 22–29)
CREAT SERPL-MCNC: 0.5 MG/DL (ref 0.5–0.9)
EOSINOPHILS ABSOLUTE: 0.2 K/UL (ref 0–0.6)
EOSINOPHILS RELATIVE PERCENT: 3.3 % (ref 0–5)
GFR NON-AFRICAN AMERICAN: >60
GLUCOSE BLD-MCNC: 176 MG/DL (ref 74–109)
HBA1C MFR BLD: 7.4 % (ref 4–6)
HCT VFR BLD CALC: 41.2 % (ref 37–47)
HDLC SERPL-MCNC: 54 MG/DL (ref 65–121)
HEMOGLOBIN: 13.6 G/DL (ref 12–16)
LDL CHOLESTEROL CALCULATED: 88 MG/DL
LYMPHOCYTES ABSOLUTE: 2.5 K/UL (ref 1.1–4.5)
LYMPHOCYTES RELATIVE PERCENT: 48.4 % (ref 20–40)
MCH RBC QN AUTO: 31.1 PG (ref 27–31)
MCHC RBC AUTO-ENTMCNC: 33 G/DL (ref 33–37)
MCV RBC AUTO: 94.1 FL (ref 81–99)
MONOCYTES ABSOLUTE: 0.4 K/UL (ref 0–0.9)
MONOCYTES RELATIVE PERCENT: 7.8 % (ref 0–10)
NEUTROPHILS ABSOLUTE: 2 K/UL (ref 1.5–7.5)
NEUTROPHILS RELATIVE PERCENT: 39.7 % (ref 50–65)
PDW BLD-RTO: 11.9 % (ref 11.5–14.5)
PLATELET # BLD: 263 K/UL (ref 130–400)
PMV BLD AUTO: 9.7 FL (ref 9.4–12.3)
POTASSIUM SERPL-SCNC: 4.3 MMOL/L (ref 3.5–5)
RBC # BLD: 4.38 M/UL (ref 4.2–5.4)
SODIUM BLD-SCNC: 142 MMOL/L (ref 136–145)
TOTAL PROTEIN: 7.2 G/DL (ref 6.6–8.7)
TRIGL SERPL-MCNC: 106 MG/DL (ref 0–149)
TSH SERPL DL<=0.05 MIU/L-ACNC: 1.81 UIU/ML (ref 0.27–4.2)
WBC # BLD: 5.1 K/UL (ref 4.8–10.8)

## 2019-07-09 PROCEDURE — 99214 OFFICE O/P EST MOD 30 MIN: CPT | Performed by: NURSE PRACTITIONER

## 2019-07-09 RX ORDER — CYCLOBENZAPRINE HCL 10 MG
10 TABLET ORAL NIGHTLY PRN
Qty: 30 TABLET | Refills: 0 | Status: SHIPPED | OUTPATIENT
Start: 2019-07-09 | End: 2019-07-19

## 2019-07-09 ASSESSMENT — ENCOUNTER SYMPTOMS
NAUSEA: 0
COLOR CHANGE: 0
COUGH: 0
BACK PAIN: 0
VOMITING: 0
SHORTNESS OF BREATH: 0
VOICE CHANGE: 0
RHINORRHEA: 0
PHOTOPHOBIA: 0

## 2019-07-09 NOTE — PROGRESS NOTES
[facility]  [address]  [address] [place] [address]  Dept: [phone]  Dept Fax: [phone]  Loc: [phone]    Lindsey Singleton is a 62 y.o. female who presents today for her medical conditions/complaints as noted below. Lindsey Singleton is c/o of Hypertension (6 month follow up)        HPI:     HPI   Chief Complaint   Patient presents with    Hypertension     6 month follow up       Patient presents today for hypertension, hyperlipidemia and diabetes follow-up. Patient is currently on victoza and metformin. She has recently history of breast cancer; has been seeing Dr Les Infante and Dr Liz Marcus; she will follow-up next month and will get a repeat mammogram and DEXA. She actually fractured right shoulder in February due to fall. She is trying to transition to sleeping in her bed; she has seen Dr Jacob Hernandez 59 and shoulder is healing but she is not sleeping and still does not have full ROM. She is doing exercises at home. She is going to return to work in August. She is a ; she is now driving and doing okay. We do not have her A1C back today; she is taking just metformin once per day; she prefers to not do twice daily because of GI side effects. She is hoping once she returns to the gym her blood sugar and weight will improve. She is up to date on colonoscopy; she had eye exam 3 weeks ago. Pap will be at next visit. Treatment Adherence:   Medication compliance:  compliant most of the time  Diet compliance:  compliant most of the time  Weight trend: stable  Current exercise: no regular exercise  Barriers: none    Diabetes Mellitus Type 2: Current symptoms/problems include none. Home blood sugar records: fasting range: 150s  Any episodes of hypoglycemia? no  Eye exam current (within one year): yes  Tobacco history: She  reports that she has never smoked. She has never used smokeless tobacco.   Daily Aspirin?  Yes    Hypertension:  Home blood pressure monitoring: No.  She is adherent to a low sodium diet. Patient denies chest pain, shortness of breath and headache. Antihypertensive medication side effects: no medication side effects noted. Use of agents associated with hypertension: none. Hyperlipidemia:  No new myalgias or GI upset on atorvastatin (Lipitor).        Lab Results   Component Value Date    LABA1C 7.4 (H) 01/09/2019    LABA1C 6.8 (H) 08/07/2018    LABA1C 7.2 (H) 03/14/2018     Lab Results   Component Value Date    LABMICR <1.20 08/07/2018    CREATININE 0.5 07/09/2019     Lab Results   Component Value Date    ALT 24 07/09/2019    AST 16 07/09/2019     Lab Results   Component Value Date    CHOL 163 07/09/2019    TRIG 106 07/09/2019    HDL 54 (L) 07/09/2019    LDLCALC 88 07/09/2019    LDLDIRECT 64 (L) 09/11/2017        Past Medical History:   Diagnosis Date    Abdominal hernia     Cancer ([facility] [address]     breast cancer    Carotid artery plaque     9/16 <50% rt;  50-69% left    Essential hypertension     H/O abnormal cervical Papanicolaou smear     Mixed hyperlipidemia     Osteopenia     Simple goiter     Type 2 diabetes mellitus without complication ([address]     Umbilical hernia       Past Surgical History:   Procedure Laterality Date    CHOLECYSTECTOMY  1997    COLONOSCOPY      COLPOSCOPY      6/07 Dr Janki Barakat, no dysplasia    DE BX/REMV,LYMPH NODE,DEEP AXILL Left 11/6/2018    BREAST BIOPSY SENTINEL NODE performed by Daryl Mckenna MD at [place] [address], PARTIAL Left 9/28/2018    BREAST LUMPECTOMY AND INTRAOPERATIVE ULTRASOUND GUIDED NEEDLE LOCALIZATION AND IORT performed by Daryl Mckenna MD at [address] [place] [address] 7/9/2019 4/25/2019 1/30/2019 1/9/2019 11/14/2018 25/6/7235   SYSTOLIC 892 [phone] 400 -   DIASTOLIC 84 70 78 74 60 -   Site Left Upper Arm Left Upper Arm Right Upper Arm Right Upper Arm Right Upper Arm -   Position Sitting Sitting Sitting Sitting Sitting -   Cuff Size - Medium Adult Medium Adult - Medium Adult -   Pulse 88 76 76 94 72 -   Resp - - - - - 10   SpO2 94 - - 98 - 99   Weight 143 lb - - 148 lb - -   Height 5' 3\" - - 5' 3\" - -   BMI (wt*703/ht~2) 25.33 kg/m2 - - 26.21 kg/m2 - -   Some recent data might be hidden       Family History   Problem Relation Age of Onset    Diabetes Father     Heart Disease Father     High Blood Pressure Father     Cancer Father         Pancreatic cancer-  at 80    Coronary Art Dis Father     Coronary Art Dis Mother        Social History     Tobacco Use    Smoking status: Never Smoker    Smokeless tobacco: Never Used   Substance Use Topics    Alcohol use: No      Current Outpatient Medications   Medication Sig Dispense Refill    Cyanocobalamin (VITAMIN B 12 PO) Take by mouth      cyclobenzaprine (FLEXERIL) 10 MG tablet Take 1 tablet by mouth nightly as needed for Muscle spasms 30 tablet 0    irbesartan (AVAPRO) 75 MG tablet 1/2 tablet daily 15 tablet 0    letrozole (FEMARA) 2.5 MG tablet Take 1 tablet by mouth daily 90 tablet 3    BD PEN NEEDLE PRIYANKA U/F 32G X 4 MM MISC USE AS DIRECTED WITH VICTOZA 100 each 5    Flaxseed, Linseed, (FLAXSEED OIL PO) Take by mouth      Nutritional Supplements (DHEA PO) Take by mouth      COCONUT OIL PO Take by mouth      Calcium Citrate-Vitamin D (CALCIUM CITRATE + D PO) Take by mouth      montelukast (SINGULAIR) 10 MG tablet Take 1 tablet by mouth daily 90 tablet 3    Liraglutide (VICTOZA) 18 MG/3ML SOPN SC injection Inject 1.2 mg into the skin daily 9 pen 3    metFORMIN (GLUCOPHAGE-XR) 750 MG extended release tablet Take 1 tablet by mouth daily (with breakfast) 90 tablet 3    atorvastatin (LIPITOR) 40 MG tablet Take 1 tablet by mouth daily 90 tablet 3    blood glucose monitor kit and supplies 1 kit by Other route daily Test 1 times a day & as needed for symptoms of irregular blood glucose. 1 kit 0    blood glucose monitor strips Test blood sugars daily.  100 strip 3    Omega-3 Fatty Acids (FISH OIL) 1000 MG CAPS Take 3,000 mg

## 2019-07-29 ENCOUNTER — TELEPHONE (OUTPATIENT)
Dept: INTERNAL MEDICINE | Age: 59
End: 2019-07-29

## 2019-07-31 ENCOUNTER — OFFICE VISIT (OUTPATIENT)
Dept: INTERNAL MEDICINE | Age: 59
End: 2019-07-31
Payer: COMMERCIAL

## 2019-07-31 VITALS
HEART RATE: 87 BPM | TEMPERATURE: 98.3 F | SYSTOLIC BLOOD PRESSURE: 124 MMHG | DIASTOLIC BLOOD PRESSURE: 62 MMHG | OXYGEN SATURATION: 97 %

## 2019-07-31 DIAGNOSIS — J02.9 PHARYNGITIS, UNSPECIFIED ETIOLOGY: Primary | ICD-10-CM

## 2019-07-31 PROCEDURE — 99213 OFFICE O/P EST LOW 20 MIN: CPT | Performed by: NURSE PRACTITIONER

## 2019-07-31 RX ORDER — AMOXICILLIN AND CLAVULANATE POTASSIUM 875; 125 MG/1; MG/1
1 TABLET, FILM COATED ORAL 2 TIMES DAILY
Qty: 20 TABLET | Refills: 0 | Status: SHIPPED | OUTPATIENT
Start: 2019-07-31 | End: 2020-01-08 | Stop reason: ALTCHOICE

## 2019-07-31 ASSESSMENT — ENCOUNTER SYMPTOMS
COUGH: 0
COLOR CHANGE: 0
BACK PAIN: 0
PHOTOPHOBIA: 0
SHORTNESS OF BREATH: 0
VOMITING: 0
RHINORRHEA: 0
VOICE CHANGE: 0
NAUSEA: 0
SORE THROAT: 1

## 2019-07-31 NOTE — PROGRESS NOTES
tablet by mouth 2 times daily     Dispense:  20 tablet     Refill:  0         ORDERS:  No orders of the defined types were placed in this encounter. Follow-up:  Return if symptoms worsen or fail to improve. PATIENT INSTRUCTIONS:  Patient Instructions   1. Begin Augmentin as directed. 2. May use flonase daily. 3. Call for worsened symptoms. 4. Next visit will be in [id number]. Electronically signed by JAVAN Cuenca on 7/31/2019 at 1:27 PM    EMR Dragon/transcription disclaimer:  Much of thisencounter note is electronic transcription/translation of spoken language to printed texts. The electronic translation of spoken language may be erroneous, or at times, nonsensical words or phrases may be inadvertentlytranscribed.   Although I have reviewed the note for such errors, some may still exist.

## 2019-09-13 ENCOUNTER — TELEPHONE (OUTPATIENT)
Dept: SURGERY | Age: 59
End: 2019-09-13

## 2019-09-30 DIAGNOSIS — E11.9 TYPE 2 DIABETES MELLITUS WITHOUT COMPLICATION, WITHOUT LONG-TERM CURRENT USE OF INSULIN (HCC): ICD-10-CM

## 2019-10-01 RX ORDER — LIRAGLUTIDE 6 MG/ML
INJECTION SUBCUTANEOUS
Qty: 18 ML | Refills: 5 | Status: SHIPPED | OUTPATIENT
Start: 2019-10-01 | End: 2020-08-06

## 2019-10-25 DIAGNOSIS — I10 ESSENTIAL HYPERTENSION: ICD-10-CM

## 2019-10-25 DIAGNOSIS — E78.2 MIXED HYPERLIPIDEMIA: ICD-10-CM

## 2019-10-25 RX ORDER — ATORVASTATIN CALCIUM 40 MG/1
40 TABLET, FILM COATED ORAL DAILY
Qty: 90 TABLET | Refills: 0 | Status: SHIPPED | OUTPATIENT
Start: 2019-10-25 | End: 2020-02-06

## 2019-10-25 RX ORDER — IRBESARTAN 75 MG/1
TABLET ORAL
Qty: 45 TABLET | Refills: 1 | Status: SHIPPED | OUTPATIENT
Start: 2019-10-25 | End: 2020-04-16

## 2019-10-25 RX ORDER — IRBESARTAN 75 MG/1
TABLET ORAL
Qty: 45 TABLET | Refills: 1 | Status: SHIPPED | OUTPATIENT
Start: 2019-10-25 | End: 2019-10-25 | Stop reason: SDUPTHER

## 2019-10-28 ENCOUNTER — HOSPITAL ENCOUNTER (OUTPATIENT)
Dept: WOMENS IMAGING | Age: 59
Discharge: HOME | End: 2019-10-28
Payer: COMMERCIAL

## 2019-10-28 ENCOUNTER — OFFICE VISIT (OUTPATIENT)
Dept: SURGERY | Age: 59
End: 2019-10-28
Payer: COMMERCIAL

## 2019-10-28 VITALS — SYSTOLIC BLOOD PRESSURE: 142 MMHG | HEART RATE: 72 BPM | DIASTOLIC BLOOD PRESSURE: 80 MMHG

## 2019-10-28 DIAGNOSIS — Z98.890 STATUS POST LEFT BREAST LUMPECTOMY: ICD-10-CM

## 2019-10-28 DIAGNOSIS — Z17.0 MALIGNANT NEOPLASM OF OVERLAPPING SITES OF LEFT BREAST IN FEMALE, ESTROGEN RECEPTOR POSITIVE (HCC): Primary | ICD-10-CM

## 2019-10-28 DIAGNOSIS — Z85.3 PERSONAL HISTORY OF BREAST CANCER: ICD-10-CM

## 2019-10-28 DIAGNOSIS — C50.812 MALIGNANT NEOPLASM OF OVERLAPPING SITES OF LEFT BREAST IN FEMALE, ESTROGEN RECEPTOR POSITIVE (HCC): Primary | ICD-10-CM

## 2019-10-28 PROCEDURE — 99214 OFFICE O/P EST MOD 30 MIN: CPT | Performed by: SURGERY

## 2019-10-28 PROCEDURE — G0279 TOMOSYNTHESIS, MAMMO: HCPCS

## 2019-11-22 RX ORDER — LETROZOLE 2.5 MG/1
2.5 TABLET, FILM COATED ORAL DAILY
Qty: 90 TABLET | Refills: 0 | Status: SHIPPED | OUTPATIENT
Start: 2019-11-22 | End: 2020-02-10

## 2019-11-25 RX ORDER — LANCETS
EACH MISCELLANEOUS
Qty: 100 EACH | Refills: 3 | Status: SHIPPED | OUTPATIENT
Start: 2019-11-25

## 2019-12-09 RX ORDER — MONTELUKAST SODIUM 10 MG/1
10 TABLET ORAL DAILY
Qty: 90 TABLET | Refills: 3 | Status: SHIPPED | OUTPATIENT
Start: 2019-12-09 | End: 2020-05-08 | Stop reason: SDUPTHER

## 2020-01-08 ENCOUNTER — OFFICE VISIT (OUTPATIENT)
Dept: PRIMARY CARE CLINIC | Age: 60
End: 2020-01-08
Payer: COMMERCIAL

## 2020-01-08 ENCOUNTER — TELEPHONE (OUTPATIENT)
Dept: PRIMARY CARE CLINIC | Age: 60
End: 2020-01-08

## 2020-01-08 VITALS
SYSTOLIC BLOOD PRESSURE: 118 MMHG | HEART RATE: 77 BPM | TEMPERATURE: 98 F | OXYGEN SATURATION: 97 % | BODY MASS INDEX: 25.87 KG/M2 | HEIGHT: 63 IN | DIASTOLIC BLOOD PRESSURE: 62 MMHG | WEIGHT: 146 LBS

## 2020-01-08 DIAGNOSIS — I10 ESSENTIAL HYPERTENSION: ICD-10-CM

## 2020-01-08 DIAGNOSIS — E78.2 MIXED HYPERLIPIDEMIA: ICD-10-CM

## 2020-01-08 DIAGNOSIS — E11.9 TYPE 2 DIABETES MELLITUS WITHOUT COMPLICATION, WITHOUT LONG-TERM CURRENT USE OF INSULIN (HCC): ICD-10-CM

## 2020-01-08 LAB
ALBUMIN SERPL-MCNC: 4.6 G/DL (ref 3.5–5.2)
ALP BLD-CCNC: 142 U/L (ref 35–104)
ALT SERPL-CCNC: 25 U/L (ref 5–33)
ANION GAP SERPL CALCULATED.3IONS-SCNC: 18 MMOL/L (ref 7–19)
AST SERPL-CCNC: 21 U/L (ref 5–32)
BASOPHILS ABSOLUTE: 0 K/UL (ref 0–0.2)
BASOPHILS RELATIVE PERCENT: 0.5 % (ref 0–1)
BILIRUB SERPL-MCNC: 0.9 MG/DL (ref 0.2–1.2)
BUN BLDV-MCNC: 13 MG/DL (ref 8–23)
CALCIUM SERPL-MCNC: 9.8 MG/DL (ref 8.8–10.2)
CHLORIDE BLD-SCNC: 103 MMOL/L (ref 98–111)
CHOLESTEROL, TOTAL: 170 MG/DL (ref 160–199)
CO2: 24 MMOL/L (ref 22–29)
CREAT SERPL-MCNC: 0.5 MG/DL (ref 0.5–0.9)
EOSINOPHILS ABSOLUTE: 0.2 K/UL (ref 0–0.6)
EOSINOPHILS RELATIVE PERCENT: 2.4 % (ref 0–5)
GFR NON-AFRICAN AMERICAN: >60
GLUCOSE BLD-MCNC: 143 MG/DL (ref 74–109)
HBA1C MFR BLD: 7.7 % (ref 4–6)
HCT VFR BLD CALC: 43.8 % (ref 37–47)
HDLC SERPL-MCNC: 58 MG/DL (ref 65–121)
HEMOGLOBIN: 14.3 G/DL (ref 12–16)
IMMATURE GRANULOCYTES #: 0 K/UL
LDL CHOLESTEROL CALCULATED: 89 MG/DL
LYMPHOCYTES ABSOLUTE: 2.6 K/UL (ref 1.1–4.5)
LYMPHOCYTES RELATIVE PERCENT: 34.1 % (ref 20–40)
MCH RBC QN AUTO: 31.2 PG (ref 27–31)
MCHC RBC AUTO-ENTMCNC: 32.6 G/DL (ref 33–37)
MCV RBC AUTO: 95.6 FL (ref 81–99)
MONOCYTES ABSOLUTE: 0.5 K/UL (ref 0–0.9)
MONOCYTES RELATIVE PERCENT: 6.6 % (ref 0–10)
NEUTROPHILS ABSOLUTE: 4.2 K/UL (ref 1.5–7.5)
NEUTROPHILS RELATIVE PERCENT: 56.3 % (ref 50–65)
PDW BLD-RTO: 12.5 % (ref 11.5–14.5)
PLATELET # BLD: 310 K/UL (ref 130–400)
PMV BLD AUTO: 10 FL (ref 9.4–12.3)
POTASSIUM SERPL-SCNC: 4 MMOL/L (ref 3.5–5)
RBC # BLD: 4.58 M/UL (ref 4.2–5.4)
SODIUM BLD-SCNC: 145 MMOL/L (ref 136–145)
TOTAL PROTEIN: 7.9 G/DL (ref 6.6–8.7)
TRIGL SERPL-MCNC: 115 MG/DL (ref 0–149)
TSH SERPL DL<=0.05 MIU/L-ACNC: 0.64 UIU/ML (ref 0.27–4.2)
WBC # BLD: 7.5 K/UL (ref 4.8–10.8)

## 2020-01-08 PROCEDURE — 99396 PREV VISIT EST AGE 40-64: CPT | Performed by: NURSE PRACTITIONER

## 2020-01-08 ASSESSMENT — ENCOUNTER SYMPTOMS
SHORTNESS OF BREATH: 0
COLOR CHANGE: 0
VOICE CHANGE: 0
VOMITING: 0
COUGH: 0
NAUSEA: 0
RHINORRHEA: 0
BACK PAIN: 0
PHOTOPHOBIA: 0

## 2020-01-08 NOTE — PROGRESS NOTES
[facility]  [address] [address]  [address] [address]  Dept: [phone]  Dept Fax: [phone]  Loc: [phone]    Kaia Givens is a 62 y.o. female who presents today for her medical conditions/complaints as noted below. Kaia Reading is c/o of Hypertension (6 month follow up) and Gynecologic Exam    HPI:     HPI   Chief Complaint   Patient presents with    Hypertension     6 month follow up   Bedelia Fruits Gynecologic Exam     Patient presents today for diabetes and hypertension follow-up. Blood pressure has been well-controlled on medications. She states blood sugar has been within normal range. Patient also is here for pap today. She is up-to-date on colonoscopy and mammogram. She has history of breast cancer; she is followed by Dr Gucci Gerard and Dr Hannah Anderson.      Past Medical History:   Diagnosis Date    Abdominal hernia     Cancer [facility])     breast cancer    Carotid artery plaque     9/16 <50% rt;  50-69% left    Essential hypertension     H/O abnormal cervical Papanicolaou smear     Mixed hyperlipidemia     Osteopenia     Simple goiter     Type 2 diabetes mellitus without complication ([facility] [address]     Umbilical hernia       Past Surgical History:   Procedure Laterality Date    CHOLECYSTECTOMY  1997    COLONOSCOPY      COLPOSCOPY      6/07 Dr Ele Flowers, no dysplasia    ME BX/REMV,LYMPH NODE,DEEP AXILL Left 11/6/2018    BREAST BIOPSY SENTINEL NODE performed by Kristian Gaxiola MD at [facility], PARTIAL Left 9/28/2018    BREAST LUMPECTOMY AND INTRAOPERATIVE ULTRASOUND GUIDED NEEDLE LOCALIZATION AND IORT performed by Kristian Gaxiola MD at [facility] 1/8/2020 10/28/2019 7/31/2019 7/9/2019 4/25/2019 8/36/6697   SYSTOLIC 370 [phone]   DIASTOLIC 62 80 62 [phone] 78   Site Right Upper Arm Right Upper Arm Left Upper Arm Left Upper Arm Left Upper Arm Right Upper Arm   Position Sitting Sitting Sitting Sitting Sitting Sitting   Cuff Size - as needed for symptoms of irregular blood glucose. 1 kit 0    blood glucose monitor strips Test blood sugars daily. 100 strip 3     No current facility-administered medications for this visit. No Known Allergies    Health Maintenance   Topic Date Due    Hepatitis C screen  1960    HIV screen  01/24/1973    Cervical cancer screen  01/24/1979    Shingles Vaccine (2 of 3) 05/14/2012    Diabetic retinal exam  06/28/2018    Diabetic foot exam  08/07/2019    Diabetic microalbuminuria test  08/07/2019    Flu vaccine (1) 09/01/2019    A1C test (Diabetic or Prediabetic)  07/09/2020    Lipid screen  07/09/2020    Potassium monitoring  07/09/2020    Creatinine monitoring  07/09/2020    Breast cancer screen  10/28/2020    Colon cancer screen colonoscopy  07/16/2023    DTaP/Tdap/Td vaccine (2 - Td) 08/07/2028    Pneumococcal 0-64 years Vaccine  Completed       Subjective:      Review of Systems   Constitutional: Negative for chills and fever. HENT: Negative for ear pain, hearing loss, rhinorrhea and voice change. Eyes: Negative for photophobia and visual disturbance. Respiratory: Negative for cough and shortness of breath. Cardiovascular: Negative for chest pain and palpitations. Gastrointestinal: Negative for nausea and vomiting. Endocrine: Negative. Negative for cold intolerance and heat intolerance. Genitourinary: Negative for difficulty urinating and flank pain. Musculoskeletal: Negative for back pain and neck pain. Skin: Negative for color change and rash. Allergic/Immunologic: Negative for environmental allergies and food allergies. Neurological: Negative for dizziness, speech difficulty and headaches. Hematological: Does not bruise/bleed easily. Psychiatric/Behavioral: Negative for sleep disturbance and suicidal ideas. Objective:     Physical Exam  Vitals signs and nursing note reviewed. Constitutional:       Appearance: She is well-developed.    HENT:

## 2020-01-09 DIAGNOSIS — Z12.4 ENCOUNTER FOR PAPANICOLAOU SMEAR OF CERVIX: ICD-10-CM

## 2020-01-13 LAB
HPV COMMENT: NORMAL
HPV TYPE 16: NOT DETECTED
HPV TYPE 18: NOT DETECTED
HPVOH (OTHER TYPES): NOT DETECTED

## 2020-01-14 RX ORDER — METFORMIN HYDROCHLORIDE 750 MG/1
TABLET, EXTENDED RELEASE ORAL
Qty: 90 TABLET | Refills: 3 | Status: SHIPPED | OUTPATIENT
Start: 2020-01-14 | End: 2020-12-04

## 2020-02-10 RX ORDER — LETROZOLE 2.5 MG/1
2.5 TABLET, FILM COATED ORAL DAILY
Qty: 90 TABLET | Refills: 0 | Status: SHIPPED | OUTPATIENT
Start: 2020-02-10 | End: 2020-02-24 | Stop reason: SDUPTHER

## 2020-02-24 RX ORDER — LETROZOLE 2.5 MG/1
2.5 TABLET, FILM COATED ORAL DAILY
Qty: 90 TABLET | Refills: 2 | Status: SHIPPED | OUTPATIENT
Start: 2020-02-24 | End: 2020-04-28

## 2020-04-16 RX ORDER — IRBESARTAN 75 MG/1
TABLET ORAL
Qty: 45 TABLET | Refills: 1 | Status: SHIPPED | OUTPATIENT
Start: 2020-04-16 | End: 2020-08-04 | Stop reason: SDUPTHER

## 2020-04-28 RX ORDER — LETROZOLE 2.5 MG/1
2.5 TABLET, FILM COATED ORAL DAILY
Qty: 30 TABLET | Refills: 0 | Status: SHIPPED | OUTPATIENT
Start: 2020-04-28 | End: 2020-05-08 | Stop reason: SDUPTHER

## 2020-05-08 ENCOUNTER — OFFICE VISIT (OUTPATIENT)
Dept: HEMATOLOGY | Age: 60
End: 2020-05-08
Payer: COMMERCIAL

## 2020-05-08 ENCOUNTER — HOSPITAL ENCOUNTER (OUTPATIENT)
Dept: INFUSION THERAPY | Age: 60
Discharge: HOME | End: 2020-05-08
Payer: COMMERCIAL

## 2020-05-08 VITALS
OXYGEN SATURATION: 98 % | HEIGHT: 63 IN | TEMPERATURE: 98.9 F | HEART RATE: 75 BPM | SYSTOLIC BLOOD PRESSURE: 140 MMHG | BODY MASS INDEX: 25.85 KG/M2 | DIASTOLIC BLOOD PRESSURE: 62 MMHG | WEIGHT: 145.9 LBS

## 2020-05-08 DIAGNOSIS — C50.912 INVASIVE DUCTAL CARCINOMA OF BREAST, LEFT (HCC): ICD-10-CM

## 2020-05-08 PROBLEM — Z78.0 POSTMENOPAUSAL STATE: Status: ACTIVE | Noted: 2020-05-08

## 2020-05-08 PROCEDURE — 85025 COMPLETE CBC W/AUTO DIFF WBC: CPT

## 2020-05-08 PROCEDURE — 99211 OFF/OP EST MAY X REQ PHY/QHP: CPT

## 2020-05-08 PROCEDURE — 99213 OFFICE O/P EST LOW 20 MIN: CPT | Performed by: NURSE PRACTITIONER

## 2020-05-08 RX ORDER — LETROZOLE 2.5 MG/1
9 TABLET, FILM COATED ORAL DAILY
Qty: 90 TABLET | Refills: 3 | Status: SHIPPED
Start: 2020-05-08 | End: 2020-05-27 | Stop reason: SINTOL

## 2020-05-08 RX ORDER — MONTELUKAST SODIUM 10 MG/1
10 TABLET ORAL DAILY
Qty: 90 TABLET | Refills: 3 | Status: SHIPPED | OUTPATIENT
Start: 2020-05-08 | End: 2021-05-13 | Stop reason: SDUPTHER

## 2020-05-08 ASSESSMENT — ENCOUNTER SYMPTOMS
EYE PAIN: 0
RESPIRATORY NEGATIVE: 1
ABDOMINAL PAIN: 0
EYES NEGATIVE: 1
WHEEZING: 0
NAUSEA: 0
SORE THROAT: 0
CONSTIPATION: 0
VOMITING: 0
EYE REDNESS: 0
SHORTNESS OF BREATH: 0
DIARRHEA: 0
EYE DISCHARGE: 0
GASTROINTESTINAL NEGATIVE: 1
COUGH: 0
BLOOD IN STOOL: 0
BACK PAIN: 0

## 2020-05-08 NOTE — PROGRESS NOTES
Progress Note      Pt Name: Tere Argueta  YOB: 1960  MRN: [id number]    Date of evaluation: 5/8/2020  History Obtained From:  patient, electronic medical record    CHIEF COMPLAINT:    Chief Complaint   Patient presents with    Follow-up     Malignant neoplasm of overlapping sites of left breast in female, estrogen receptor positive      HISTORY OF PRESENT ILLNESS:    Tere Argueta is a 56 y.o.  female with significant PMH invasive low-grade ductal carcinoma and is status post left lumpectomy and IORT on 9/28/2018. Carlitos Kent was seen in initial oncology consultation on 11/20/2018, unfortunately she did not keep her scheduled follow-up appointments and returns today to reestablish care. Carlitos Kent presents today indicating that overall she is doing well and denies any breast complaints. She indicates that she continues to take the letrozole 2.5 mg daily and calcium supplement without difficulty. Carlitos Kent denied any hot flashes, arthralgias or vaginal dryness. She was last seen by Dr. Isaiah Ureña on 10/28/2019 and is scheduled to be seen in follow-up on 5/27/2020. Bilateral mammogram on 10/28/2019 documented no mammographic evidence of malignancy, BI-RADS Category 2. A bone mineral density study was requested at initial appointment, this has not been completed and will need to be rescheduled. ONCOLOGIC HISTORY:     Diagnosis:   Invasive low grade ductal carcinoma with associated intermediate grade ductal carcinoma in situ, 8/1/2018   ER 99%, [place] 21%, HER-2/nancy 1+ by IHC   Ki-67 is 7%, low   pT1b, pN0   Low risk, luminal type A by Mammaprint   Tumor 1.1 cm    Treatment summary:  Letrozole 2.5 mg daily initiated in 8/29/2018   Left lumpectomy and IORT on 9/28/2018   Left axilla sentinel lymph node biopsy, total of 5 nodes negative, 11/6/2018    Ms. Marzena Perry was seen in initial oncology consultation on 11/20/2018 for a recent diagnosis of invasive low grade ductal carcinoma of the left breast. She is status post left lumpectomy, left sentinel node biopsy, IORT and initiated on Femara by Dr. Argentina George. Francine Araujo was referred from Dr. Irma Sarmiento to establish care. Francine Araujo presented with no specific complaints and indicated tolerating the Femara without difficulty. The following are pertinent events related to her diagnosis of breast cancer:  7/18/2018- bilateral mammogram documented an area of asymmetrical density deep in the upper lateral right breast is stable. An area of parenchymal distortion and spiculation deep in the lateral left breast, which is lateral and deep or to the area of prior surgery. BI-RADS Category 0   7/24/2018- unilateral coned mammogram of the left breast documented a spiculated 1.1 x 0.8 cm density with regional distortion. Persistent within the left lateral breast near the 2 to 3 o'clock position in the posterior depth. BI-RADS Category 4.   8/1/2018- Ultrasound-guided biopsy of the left breast revealed a 1.1 by 1.0 x 0.74 cm grade invasive mammary carcinoma. ER 99%, [place] 21%, HER-2/nancy negative by IHC and Ki-67 is 7% and low. Mamma print low risk. 8/20/2018- MRI of the breast documented postoperative changes in the left breast consistent with the biopsy-proven invasive breast cancer. No additional sites of suspicious enhancement identified in bilateral breast and no evidence of lymphadenopathy. 8/29/2018- initiated letrozole 2.5 mg daily   9/28/2018- Dr. Irma Sarmiento completed left lumpectomy with IORT. Pathology documented invasive low grade ductal carcinoma with associated intermediate grade ductal carcinoma in situ, pT1b,pNX. Tumor measured 0.9 cm in greatest dimension. Margins of excision were negative. 11/6/2018- [place] lymph node biopsy.  5 lymph nodes negative for metastatic carcinoma  11/20/2018-recommendation to continue letrozole 2.5 mg for a additional 5-year dosing  10/28/2019-bilateral mammogram documented no mammographic evidence of malignancy, BI-RADS Category or rash. Neurological:      Mental Status: She is alert and oriented to person, place, and time. Cranial Nerves: No cranial nerve deficit. Coordination: Coordination normal.   Psychiatric:         Behavior: Behavior normal.         Thought Content: Thought content normal.         Labs: WBC 7.37, ANC 4.11, hemoglobin 13.9, MCV 96.8 and platelet count of 616,426  CBC: No results for input(s): WBC, HGB, PLT in the last 72 hours. CMP: No results for input(s): GLUCOSE, BUN, CREATININE, BCR, CO2, CALCIUM, ALBUMIN, LABIL2, ALKPHOS, AST, ALT in the last 72 hours. Invalid input(s): EGFRIFNONA, EGFRIFAFRI, SODIUM, POTASSIUM, CHLORIDE, PROTENTOTREF, GLOBULIN, BILIRUBIN  Hepatic: No results for input(s): AST, ALT, ALB, BILITOT, ALKPHOS in the last 72 hours. Troponin: No results for input(s): TROPONINI in the last 72 hours. BNP: No results for input(s): BNP in the last 72 hours. Lipids: No results for input(s): CHOL, HDL in the last 72 hours. Invalid input(s): LDL  INR: No results for input(s): INR in the last 72 hours. ABG: No results for input(s): PHART, OPV3EZI, PO2ART, VXR2QDM, G2CFMQEZ, BEART in the last 72 hours. 30 Day lookback of cultures:    Blood Culture Recent: No results for input(s): BC in the last 720 hours. Gram Stain Recent: No results for input(s): LABGRAM in the last 720 hours. Resp Culture Recent: No results for input(s): CULTRESP in the last 720 hours. Body Fluid Recent : No results for input(s): BFCX in the last 720 hours. MRSA Recent : No results for input(s): [address] in the last 720 hours. Urine Culture Recent : No results for input(s): LABURIN in the last 720 hours. Organism Recent : No results for input(s): ORG in the last 720 hours. ASSESSMENT/PLAN:      1. Invasive ductal carcinoma of breast, left ([facility] [address] status post lumpectomy/sentinel lymph node biopsy and IORT.   Currently taking letrozole 2.5 mg daily with recommendation of total of 5-year dosing, anticipated stop

## 2020-05-21 NOTE — PROGRESS NOTES
HISTORY OF PRESENT ILLNESS:    Ms. Freddy Rodgers  is status post ultrasound guided breast biopsy  on the left which revealed a 1.1  cm low grade  invasive mammary carcinoma. ER is positive at 99%. IA is positive at 21%. Her-2 is negative by IHC. Ki67 is 7% and low    MRI 8/20/2018     Impression   1. Postbiopsy changes in the left breast are consistent with the   biopsy-proven invasive breast cancer. 2. No additional sites of suspicious enhancement are identified in   bilateral breasts. 3. No MRI evidence of lymphadenopathy       She is now status post left lumpectomy and IORT on 9/28/2018  There was a failure of the NeoProbe and we were unable to get a sentinel node. After discussion with her and with Dr. Chavez Henry we attempted rebiopsy of the sentinel node     PATHOLOGY REVEALS:  FINAL DIAGNOSIS:       A. 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 (pT1b, pNX). Tumor measures 0.9cm in greatest dimension. Tumor is 0.2cm from closest inked margin (anterior). Margins of excision are negative.       B. Left breast, additional margin cranial, excision:       Benign breast parenchyma.       Negative for malignancy.       C. Left breast, additional caudal deep margin, excision:       Benign breast parenchyma.       Negative for malignancy. She underwent sentinel node biopsy on 51/6/1556 without complications. She now comes in for follow-up. FINAL DIAGNOSIS:    Lymph nodes, left axilla, sentinel lymph node biopsy: Five lymph nodes  negative for metastatic carcinoma.     She has no new complaints today. She denies weight loss or any other systemic symptoms. She has recently been diagnosed with osteoporosis and is concerned about continuing to take the Femara. She is due to get her first dose of Prolia next week with an additional dose in 6 months.   I discussed her with Dr. Chavez Henry and switched her from Femara to tamoxifen. The prescription has been sent to her pharmacy. Mammogram-10/28/2019     Examination: Bilateral Digital Diagnostic Mammogram with Computer   Aided Detection,   Bilateral Digital Breast Tomosynthesis   Date: 10/28/2019   Indication: Annual examination   Comparison: Mammogram dated 4/25/2018, 7/18/2018, 1/10/2018, 7/7/2017,   7/60/16. Self-reported family history of breast cancer: Personal history of   left breast cancer. Technique: 2-D and 3-D images through both breasts were obtained. Additionally, magnification view of the left breast treatment site in   the upper outer aspect were obtained. Findings:    Breast Density Category = B   There are scattered areas of fibroglandular density. No suspicious   findings are present. Posttreatment changes in the left breast upper   outer aspect, without developing discrete mass or suspicious   calcifications noted.       Impression   Impression:    1.   No mammographic evidence of malignancy. 2.  BI-RADS Final Assessment Category 2: Benign. 3.  Recommend annual diagnostic mammography. PHYSICAL EXAM:  The left lumpectomy incision is looking good. There are fibrocystic changes and appropriate post operative changes. There are no dominant masses, no skin or nipple changes, and no axillary adenopathy. There is nothing suspicious for new carcinoma and no evidence of local tumor recurrence. .    IMPRESSION:    Doing well s/p left lumpectomy with IORT on 9/28/2018     PLAN: Discontinue Letrozole due to Osteoporosis and start taking Tamoxifen 20 mg daily. She will return in 6 months for a physical exam and bilateral mammograms. I have seen, examined and reviewed this patient medication list, appropriate labs and imaging studies. I reviewed relevant medical records and others physicians notes. I discussed the plans of care with the patient. I answered all the questions to the patients satisfaction.   I, Dr Ioana Ruiz, personally performed the services described in this documentation as scribed by Graeme Klein MA in my presence and is both accurate and complete. (Please note that portions of this note were completed with a voice recognition program. Efforts were made to edit the dictations but occasionally words are mis-transcribed.)  Over 50% of the total visit time of 25 minutes in face to face encounter with the patient, out of which more than 50% of the time was spent in counseling patient or family and coordination of care. Counseling included but was not limited to time spent reviewing labs, imaging studies/ treatment plan and answering questions.

## 2020-05-27 ENCOUNTER — HOSPITAL ENCOUNTER (OUTPATIENT)
Dept: WOMENS IMAGING | Age: 60
Discharge: HOME | End: 2020-05-27
Payer: COMMERCIAL

## 2020-05-27 ENCOUNTER — TELEPHONE (OUTPATIENT)
Dept: INFUSION THERAPY | Age: 60
End: 2020-05-27

## 2020-05-27 ENCOUNTER — CLINICAL DOCUMENTATION (OUTPATIENT)
Dept: HEMATOLOGY | Age: 60
End: 2020-05-27

## 2020-05-27 ENCOUNTER — TELEPHONE (OUTPATIENT)
Dept: HEMATOLOGY | Age: 60
End: 2020-05-27

## 2020-05-27 ENCOUNTER — OFFICE VISIT (OUTPATIENT)
Dept: SURGERY | Age: 60
End: 2020-05-27
Payer: COMMERCIAL

## 2020-05-27 VITALS — SYSTOLIC BLOOD PRESSURE: 128 MMHG | DIASTOLIC BLOOD PRESSURE: 70 MMHG | HEART RATE: 80 BPM

## 2020-05-27 PROBLEM — M81.0 OSTEOPOROSIS: Status: ACTIVE | Noted: 2020-05-27

## 2020-05-27 PROCEDURE — 77080 DXA BONE DENSITY AXIAL: CPT

## 2020-05-27 PROCEDURE — 99214 OFFICE O/P EST MOD 30 MIN: CPT | Performed by: SURGERY

## 2020-05-27 RX ORDER — TAMOXIFEN CITRATE 20 MG/1
20 TABLET ORAL DAILY
Qty: 90 TABLET | Refills: 3 | Status: SHIPPED | OUTPATIENT
Start: 2020-05-27 | End: 2021-05-13 | Stop reason: SDUPTHER

## 2020-05-27 RX ORDER — EPINEPHRINE 1 MG/ML
0.3 INJECTION, SOLUTION, CONCENTRATE INTRAVENOUS PRN
Status: CANCELLED | OUTPATIENT
Start: 2020-06-01

## 2020-05-27 RX ORDER — DIPHENHYDRAMINE HYDROCHLORIDE 50 MG/ML
50 INJECTION INTRAMUSCULAR; INTRAVENOUS ONCE
Status: CANCELLED | OUTPATIENT
Start: 2020-06-01

## 2020-05-27 RX ORDER — METHYLPREDNISOLONE SODIUM SUCCINATE 125 MG/2ML
125 INJECTION, POWDER, LYOPHILIZED, FOR SOLUTION INTRAMUSCULAR; INTRAVENOUS ONCE
Status: CANCELLED | OUTPATIENT
Start: 2020-06-01

## 2020-05-27 RX ORDER — SODIUM CHLORIDE 9 MG/ML
INJECTION, SOLUTION INTRAVENOUS CONTINUOUS
Status: CANCELLED | OUTPATIENT
Start: 2020-06-01

## 2020-05-27 NOTE — PROGRESS NOTES
Please build treatment regimen for Prolia 60 mg SQ every 6 months for osteoporosis. I have spoken with Ms. Lou Point and she is in agreement to move forward with treatment. Please schedule her appointment for dose #1 and then change her 6-month follow-up appointment to correlate with dose #2 of Prolia.      Thanks JAVAN Oneal

## 2020-05-27 NOTE — TELEPHONE ENCOUNTER
Spoke with MsDorita Clarice related to her bone mineral density study completed today indicating osteoporosis with a T score of -2.9 in the femoral neck and a lumbar T score of -2.7. Recommendation is to begin Prolia 60 mg subcu every 6 months and continue on calcium supplement daily. Will repeat bone mineral density study in 2 years to evaluate need to continue with Prolia.      MsDorita Dinah Stanford is in agreement to begin Prolia, will request insurance approval.

## 2020-06-01 ENCOUNTER — HOSPITAL ENCOUNTER (OUTPATIENT)
Dept: INFUSION THERAPY | Age: 60
Discharge: HOME | End: 2020-06-01
Payer: COMMERCIAL

## 2020-06-01 DIAGNOSIS — M81.0 OSTEOPOROSIS, UNSPECIFIED OSTEOPOROSIS TYPE, UNSPECIFIED PATHOLOGICAL FRACTURE PRESENCE: Primary | ICD-10-CM

## 2020-06-01 PROCEDURE — 6360000002 HC RX W HCPCS: Performed by: NURSE PRACTITIONER

## 2020-06-01 PROCEDURE — 96372 THER/PROPH/DIAG INJ SC/IM: CPT

## 2020-06-01 RX ORDER — DIPHENHYDRAMINE HYDROCHLORIDE 50 MG/ML
50 INJECTION INTRAMUSCULAR; INTRAVENOUS ONCE
Status: CANCELLED | OUTPATIENT
Start: 2020-11-30

## 2020-06-01 RX ORDER — EPINEPHRINE 1 MG/ML
0.3 INJECTION, SOLUTION, CONCENTRATE INTRAVENOUS PRN
Status: CANCELLED | OUTPATIENT
Start: 2020-11-30

## 2020-06-01 RX ORDER — METHYLPREDNISOLONE SODIUM SUCCINATE 125 MG/2ML
125 INJECTION, POWDER, LYOPHILIZED, FOR SOLUTION INTRAMUSCULAR; INTRAVENOUS ONCE
Status: CANCELLED | OUTPATIENT
Start: 2020-11-30

## 2020-06-01 RX ORDER — SODIUM CHLORIDE 9 MG/ML
INJECTION, SOLUTION INTRAVENOUS CONTINUOUS
Status: CANCELLED | OUTPATIENT
Start: 2020-11-30

## 2020-06-01 RX ADMIN — DENOSUMAB 60 MG: 60 INJECTION SUBCUTANEOUS at 11:56

## 2020-06-16 NOTE — TELEPHONE ENCOUNTER
Sarah Neil called to request a refill on her medication.       Last office visit : 2020   Next office visit : 2020     Requested Prescriptions     Pending Prescriptions Disp Refills    Insulin Pen Needle 32G X 4 MM MISC 100 each 3     Si each by Does not apply route daily            Christina Hirsch

## 2020-06-23 ENCOUNTER — VIRTUAL VISIT (OUTPATIENT)
Dept: PRIMARY CARE CLINIC | Age: 60
End: 2020-06-23
Payer: COMMERCIAL

## 2020-06-23 ENCOUNTER — HOSPITAL ENCOUNTER (OUTPATIENT)
Dept: GENERAL RADIOLOGY | Age: 60
Discharge: HOME | End: 2020-06-23
Payer: COMMERCIAL

## 2020-06-23 PROCEDURE — 73110 X-RAY EXAM OF WRIST: CPT

## 2020-06-23 PROCEDURE — 99213 OFFICE O/P EST LOW 20 MIN: CPT | Performed by: NURSE PRACTITIONER

## 2020-06-23 ASSESSMENT — ENCOUNTER SYMPTOMS
VOICE CHANGE: 0
COLOR CHANGE: 0
SHORTNESS OF BREATH: 0
BACK PAIN: 0
COUGH: 0
VOMITING: 0
NAUSEA: 0
RHINORRHEA: 0
PHOTOPHOBIA: 0

## 2020-06-23 NOTE — PROGRESS NOTES
[address] [address]             Phone:  [phone]  Fax:  [phone]       2020    TELEHEALTH EVALUATION -- Audio/Visual (During RSHNM-03 public health emergency)    HPI:  Chief Complaint   Patient presents with    Hand Pain         Fermin Crawford (:  1960) has requested an audio/video evaluation for the following concern(s):    Patient presents today for evaluation of left wrist pain. Patient reports for approximately the last 1-1/2 months she has had increasing pain in her left wrist.  She describes it as sore decreased range of motion. She states that approximately 2 days ago the wrist began to swell. She wrapped it with an Ace wrap and it seemed to help it. She has not taken any anti-inflammatories. Patient denies any known injury. Patient does frequently go to the gym to exercise. Patient does have a history of osteoporosis but no known fractures. Review of Systems   Constitutional: Negative for chills and fever. HENT: Negative for ear pain, hearing loss, rhinorrhea and voice change. Eyes: Negative for photophobia and visual disturbance. Respiratory: Negative for cough and shortness of breath. Cardiovascular: Negative for chest pain and palpitations. Gastrointestinal: Negative for nausea and vomiting. Endocrine: Negative. Negative for cold intolerance and heat intolerance. Genitourinary: Negative for difficulty urinating and flank pain. Musculoskeletal: Positive for arthralgias and joint swelling. Negative for back pain and neck pain. Skin: Negative for color change and rash. Allergic/Immunologic: Negative for environmental allergies and food allergies. Neurological: Negative for dizziness, speech difficulty and headaches. Hematological: Does not bruise/bleed easily. Psychiatric/Behavioral: Negative for sleep disturbance and suicidal ideas. Prior to Visit Medications    Medication Sig Taking?  Authorizing hyperlipidemia     Osteopenia     Simple goiter     Type 2 diabetes mellitus without complication ([facility] [address]     Umbilical hernia    ,   Past Surgical History:   Procedure Laterality Date    CHOLECYSTECTOMY      COLONOSCOPY      COLPOSCOPY       Dr Paige Catherine, no dysplasia    [place] BX/REMV,LYMPH NODE,DEEP AXILL Left 2018    BREAST BIOPSY SENTINEL NODE performed by Domenico Maxwell MD at [address] [address], PARTIAL Left 2018    BREAST LUMPECTOMY AND INTRAOPERATIVE ULTRASOUND GUIDED NEEDLE LOCALIZATION AND IORT performed by Domenico Maxwell MD at [address] [place] [address]   ,   Social History     Tobacco Use    Smoking status: Never Smoker    Smokeless tobacco: Never Used   Substance Use Topics    Alcohol use: No    Drug use: No   ,   Family History   Problem Relation Age of Onset    Diabetes Father     Heart Disease Father     High Blood Pressure Father     Cancer Father         Pancreatic cancer-  at 80    Coronary Art Dis Father     Coronary Art Dis Mother    ,   Immunization History   Administered Date(s) Administered    Influenza Virus Vaccine 2016, 2017    Pneumococcal Conjugate 13-valent ([id number]) 2018    Pneumococcal Polysaccharide ([id number]) 2018    Tdap (Boostrix, Adacel) 2018    Zoster Live (Zostavax) 2012   ,   Health Maintenance   Topic Date Due    Hepatitis C screen  1960    HIV screen  1973    Shingles Vaccine (2 of 3) 2012    Diabetic retinal exam  2018    Diabetic foot exam  2019    Diabetic microalbuminuria test  2019    Flu vaccine (Season Ended) 2020    Breast cancer screen  10/28/2020    A1C test (Diabetic or Prediabetic)  2021    Lipid screen  2021    Potassium monitoring  2021    Creatinine monitoring  2021    Colon cancer screen colonoscopy  2023    Cervical cancer screen  2025    DTaP/Tdap/Td vaccine (2 - Td) 2028    Pneumococcal 0-64 years Vaccine  Completed    Hepatitis A vaccine  Aged Out    Hib vaccine  Aged Out    Meningococcal (ACWY) vaccine  Aged Out       PHYSICAL EXAMINATION:  [ INSTRUCTIONS:  \"[x]\" Indicates a positive item  \"[]\" Indicates a negative item  -- DELETE ALL ITEMS NOT EXAMINED]  [x] Alert  [x] Oriented to person/place/time    [x] No apparent distress  [] Toxic appearing    [] Face flushed appearing [x] Sclera clear  [] Lips are cyanotic      [x] Breathing appears normal  [] Appears tachypneic      [] Rash on visible skin    [x] Cranial Nerves II-XII grossly intact    [x] Motor grossly intact in visible upper extremities    [x] Motor grossly intact in visible lower extremities    [x] Normal Mood  [] Anxious appearing    [] Depressed appearing  [] Confused appearing      [] Poor short term memory  [] Poor long term memory    [] OTHER:      Due to this being a TeleHealth encounter, evaluation of the following organ systems is limited: Vitals/Constitutional/EENT/Resp/CV/GI//MS/Neuro/Skin/Heme-Lymph-Imm. ASSESSMENT/PLAN:  1. Acute pain of left wrist  -Discussed with patient that if x-ray is negative I would encourage anti-inflammatory use and then we would consider an MRI if pain persist.  She may continue to wrap if this is more comfortable. I encouraged her to rest for approximately 3 days. She may ice and elevate as needed as well. - XR WRIST LEFT (MIN 3 VIEWS); Future    2. Osteoporosis, unspecified osteoporosis type, unspecified pathological fracture presence    - XR WRIST LEFT (MIN 3 VIEWS); Future      Return if symptoms worsen or fail to improve. An  electronic signature was used to authenticate this note.     --JAVAN Kim on 6/23/2020 at 8:38 AM        Pursuant to the emergency declaration under the [facility] waiver authority and the [employer] Act, this Virtual  Visit was conducted, with

## 2020-08-04 ENCOUNTER — OFFICE VISIT (OUTPATIENT)
Dept: PRIMARY CARE CLINIC | Age: 60
End: 2020-08-04
Payer: COMMERCIAL

## 2020-08-04 VITALS
WEIGHT: 146 LBS | BODY MASS INDEX: 25.87 KG/M2 | OXYGEN SATURATION: 98 % | HEIGHT: 63 IN | TEMPERATURE: 98 F | SYSTOLIC BLOOD PRESSURE: 120 MMHG | DIASTOLIC BLOOD PRESSURE: 64 MMHG | HEART RATE: 83 BPM

## 2020-08-04 DIAGNOSIS — E78.2 MIXED HYPERLIPIDEMIA: ICD-10-CM

## 2020-08-04 DIAGNOSIS — E11.9 TYPE 2 DIABETES MELLITUS WITHOUT COMPLICATION, WITHOUT LONG-TERM CURRENT USE OF INSULIN (HCC): ICD-10-CM

## 2020-08-04 DIAGNOSIS — I10 ESSENTIAL HYPERTENSION: ICD-10-CM

## 2020-08-04 LAB
ALBUMIN SERPL-MCNC: 4.4 G/DL (ref 3.5–5.2)
ALP BLD-CCNC: 82 U/L (ref 35–104)
ALT SERPL-CCNC: 19 U/L (ref 5–33)
ANION GAP SERPL CALCULATED.3IONS-SCNC: 14 MMOL/L (ref 7–19)
AST SERPL-CCNC: 17 U/L (ref 5–32)
BASOPHILS ABSOLUTE: 0.1 K/UL (ref 0–0.2)
BASOPHILS RELATIVE PERCENT: 0.8 % (ref 0–1)
BILIRUB SERPL-MCNC: 0.4 MG/DL (ref 0.2–1.2)
BUN BLDV-MCNC: 11 MG/DL (ref 8–23)
CALCIUM SERPL-MCNC: 9.9 MG/DL (ref 8.8–10.2)
CHLORIDE BLD-SCNC: 104 MMOL/L (ref 98–111)
CHOLESTEROL, TOTAL: 158 MG/DL (ref 160–199)
CO2: 25 MMOL/L (ref 22–29)
CREAT SERPL-MCNC: 0.5 MG/DL (ref 0.5–0.9)
EOSINOPHILS ABSOLUTE: 0.2 K/UL (ref 0–0.6)
EOSINOPHILS RELATIVE PERCENT: 3 % (ref 0–5)
GFR AFRICAN AMERICAN: >59
GFR NON-AFRICAN AMERICAN: >60
GLUCOSE BLD-MCNC: 163 MG/DL (ref 74–109)
HBA1C MFR BLD: 8.1 % (ref 4–6)
HCT VFR BLD CALC: 41.9 % (ref 37–47)
HDLC SERPL-MCNC: 53 MG/DL (ref 65–121)
HEMOGLOBIN: 13.7 G/DL (ref 12–16)
IMMATURE GRANULOCYTES #: 0 K/UL
LDL CHOLESTEROL CALCULATED: 87 MG/DL
LYMPHOCYTES ABSOLUTE: 2.2 K/UL (ref 1.1–4.5)
LYMPHOCYTES RELATIVE PERCENT: 35.7 % (ref 20–40)
MCH RBC QN AUTO: 31.4 PG (ref 27–31)
MCHC RBC AUTO-ENTMCNC: 32.7 G/DL (ref 33–37)
MCV RBC AUTO: 95.9 FL (ref 81–99)
MONOCYTES ABSOLUTE: 0.5 K/UL (ref 0–0.9)
MONOCYTES RELATIVE PERCENT: 7.9 % (ref 0–10)
NEUTROPHILS ABSOLUTE: 3.3 K/UL (ref 1.5–7.5)
NEUTROPHILS RELATIVE PERCENT: 52.4 % (ref 50–65)
PDW BLD-RTO: 12.5 % (ref 11.5–14.5)
PLATELET # BLD: 275 K/UL (ref 130–400)
PMV BLD AUTO: 10 FL (ref 9.4–12.3)
POTASSIUM SERPL-SCNC: 4.4 MMOL/L (ref 3.5–5)
RBC # BLD: 4.37 M/UL (ref 4.2–5.4)
SODIUM BLD-SCNC: 143 MMOL/L (ref 136–145)
TOTAL PROTEIN: 7.1 G/DL (ref 6.6–8.7)
TRIGL SERPL-MCNC: 90 MG/DL (ref 0–149)
WBC # BLD: 6.2 K/UL (ref 4.8–10.8)

## 2020-08-04 PROCEDURE — 3052F HG A1C>EQUAL 8.0%<EQUAL 9.0%: CPT | Performed by: NURSE PRACTITIONER

## 2020-08-04 PROCEDURE — 99214 OFFICE O/P EST MOD 30 MIN: CPT | Performed by: NURSE PRACTITIONER

## 2020-08-04 RX ORDER — IRBESARTAN 75 MG/1
TABLET ORAL
Qty: 120 TABLET | Refills: 1 | Status: SHIPPED | OUTPATIENT
Start: 2020-08-04 | End: 2021-08-25

## 2020-08-04 ASSESSMENT — ENCOUNTER SYMPTOMS
NAUSEA: 0
SHORTNESS OF BREATH: 0
VOMITING: 0
RHINORRHEA: 0
COUGH: 0
VOICE CHANGE: 0
PHOTOPHOBIA: 0
COLOR CHANGE: 0
BACK PAIN: 0

## 2020-08-04 ASSESSMENT — PATIENT HEALTH QUESTIONNAIRE - PHQ9
2. FEELING DOWN, DEPRESSED OR HOPELESS: 0
1. LITTLE INTEREST OR PLEASURE IN DOING THINGS: 0
SUM OF ALL RESPONSES TO PHQ9 QUESTIONS 1 & 2: 0
SUM OF ALL RESPONSES TO PHQ QUESTIONS 1-9: 0
SUM OF ALL RESPONSES TO PHQ QUESTIONS 1-9: 0

## 2020-08-04 NOTE — PROGRESS NOTES
[facility] PRIMARY CARE  [address] [address]  [address] [address]  Dept: [phone]  Dept Fax: [phone]  Loc: [phone]    Tara Davenport is a 56 y.o. female who presents today for her medical conditions/complaints as noted below. Tara Davenport is c/o of Hypertension (6 month); Wrist Pain (feels has arthritis,lower back discomfort also); and Diabetes        HPI:     HPI   Chief Complaint   Patient presents with    Hypertension     6 month    Wrist Pain     feels has arthritis,lower back discomfort also    Diabetes     Patient presents today for follow-up diabetes, hypertension, osteoporosis. She sees JAVAN David for follow-up breast cancer. Patient reports feeling like she had a yeast infection twice over the last 1-2 months; she took OTC meds which resolved symptoms. She reports blood sugars in 160s frequently. She is taking metformin and victoza. A1C is pending. Patient continues to have left wrist pain; she had negative imaging in June other than demineralization due to osteoporosis; she is getting prolia injections.  She is taking osteo bioflex which has helped       Past Medical History:   Diagnosis Date    Abdominal hernia     Cancer ([address]     breast cancer    Carotid artery plaque     9/16 <50% rt;  50-69% left    Essential hypertension     H/O abnormal cervical Papanicolaou smear     Mixed hyperlipidemia     Osteopenia     Simple goiter     Type 2 diabetes mellitus without complication ([address]     Umbilical hernia       Past Surgical History:   Procedure Laterality Date    CHOLECYSTECTOMY  1997    COLONOSCOPY      COLPOSCOPY      6/07 Dr Marixa Christensen, no dysplasia    CA BX/REMV,LYMPH NODE,DEEP AXILL Left 11/6/2018    BREAST BIOPSY SENTINEL NODE performed by Janie Araujo MD at [place] [address], PARTIAL Left 9/28/2018    BREAST LUMPECTOMY AND INTRAOPERATIVE ULTRASOUND GUIDED NEEDLE LOCALIZATION AND IORT performed by Terese Severance Citrate-Vitamin D (CALCIUM CITRATE + D PO) Take by mouth      blood glucose monitor kit and supplies 1 kit by Other route daily Test 1 times a day & as needed for symptoms of irregular blood glucose. 1 kit 0    blood glucose monitor strips Test blood sugars daily. 291 strip 3    folic acid (FOLVITE) 1 MG tablet Take 1 mg by mouth daily      Ascorbic Acid (VITAMIN C) 500 MG tablet Take 500 mg by mouth daily       No current facility-administered medications for this visit. No Known Allergies    Health Maintenance   Topic Date Due    Hepatitis C screen  1960    HIV screen  01/24/1973    Shingles Vaccine (2 of 3) 05/14/2012    Diabetic retinal exam  06/28/2018    Diabetic foot exam  08/07/2019    Diabetic microalbuminuria test  08/07/2019    Flu vaccine (1) 09/01/2020    Breast cancer screen  10/28/2020    A1C test (Diabetic or Prediabetic)  01/08/2021    Lipid screen  01/08/2021    Potassium monitoring  01/08/2021    Creatinine monitoring  01/08/2021    Colon cancer screen colonoscopy  07/16/2023    Cervical cancer screen  01/08/2025    DTaP/Tdap/Td vaccine (2 - Td) 08/07/2028    Pneumococcal 0-64 years Vaccine  Completed    Hepatitis A vaccine  Aged Out    Hib vaccine  Aged Out    Meningococcal (ACWY) vaccine  Aged Out       Subjective:      Review of Systems   Constitutional: Negative for chills and fever. HENT: Negative for ear pain, hearing loss, rhinorrhea and voice change. Eyes: Negative for photophobia and visual disturbance. Respiratory: Negative for cough and shortness of breath. Cardiovascular: Negative for chest pain and palpitations. Gastrointestinal: Negative for nausea and vomiting. Endocrine: Negative. Negative for cold intolerance and heat intolerance. Genitourinary: Negative for difficulty urinating and flank pain. Musculoskeletal: Negative for back pain and neck pain. Skin: Negative for color change and rash.    Allergic/Immunologic: Negative for environmental allergies and food allergies. Neurological: Negative for dizziness, speech difficulty and headaches. Hematological: Does not bruise/bleed easily. Psychiatric/Behavioral: Negative for sleep disturbance and suicidal ideas. Objective:     Physical Exam  Vitals signs and nursing note reviewed. Constitutional:       Appearance: She is well-developed. HENT:      Head: Atraumatic. Right Ear: External ear normal.      Left Ear: External ear normal.      Nose: Nose normal.   Eyes:      Conjunctiva/sclera: Conjunctivae normal.      Pupils: Pupils are equal, round, and reactive to light. Neck:      Musculoskeletal: Normal range of motion and neck supple. Cardiovascular:      Rate and Rhythm: Normal rate and regular rhythm. Heart sounds: Normal heart sounds, S1 normal and S2 normal.   Pulmonary:      Effort: Pulmonary effort is normal.      Breath sounds: Normal breath sounds. Abdominal:      General: Bowel sounds are normal.      Palpations: Abdomen is soft. Musculoskeletal: Normal range of motion. Skin:     General: Skin is warm and dry. Neurological:      Mental Status: She is alert and oriented to person, place, and time. Psychiatric:         Behavior: Behavior normal.       /64   Pulse 83   Temp 98 °F (36.7 °C) (Temporal)   Ht 5' 3\" (1.6 m)   Wt 146 lb (66.2 kg)   SpO2 98%   BMI 25.86 kg/m²     Assessment:       Diagnosis Orders   1. Mixed hyperlipidemia     2. Type 2 diabetes mellitus without complication, without long-term current use of insulin ([address]     3. Essential hypertension  irbesartan (AVAPRO) 75 MG tablet   4. Invasive ductal carcinoma of breast, left ([address]           Plan:   More than 50% of the time was spent counseling and coordinating care for a total time of 30 min face to face. Pending lab results; if A1C elevated may increase victoza to 1.8 mg but will need to monitor for reoccuring yeast infections.       Patient given educational materials -see patient instructions. Discussed use, benefit, and side effects of prescribed medications. All patient questions answered. Pt voiced understanding. Reviewed health maintenance. Instructed to continue currentmedications, diet and exercise. Patient agreed with treatment plan. Follow up as directed. MEDICATIONS:  Orders Placed This Encounter   Medications    irbesartan (AVAPRO) 75 MG tablet     Sig: TAKE ONE-HALF TABLET BY MOUTH DAILY     Dispense:  120 tablet     Refill:  1         ORDERS:  No orders of the defined types were placed in this encounter. Follow-up:  Return in about 3 months (around 11/4/2020) for video visit. PATIENT INSTRUCTIONS:  Patient Instructions   We are committed to providing you with the best care possible. In order to help us achieve these goals please remember to bring all medications, herbal products, and over the counter supplements with you to each visit. If your provider has ordered testing for you, please be sure to follow up with our office if you have not received results within 7 days after the testing took place. *If you receive a survey after visiting one of our offices, please take time to share your experience concerning your physician office visit. These surveys are confidential and no health information about you is shared. We are eager to improve for you and we are counting on your feedback to help make that happen. Electronically signed by JAVAN Lerner on 8/4/2020 at 3:54 PM    EMR Dragon/transcription disclaimer:  Much of thisencounter note is electronic transcription/translation of spoken language to printed texts. The electronic translation of spoken language may be erroneous, or at times, nonsensical words or phrases may be inadvertentlytranscribed.   Although I have reviewed the note for such errors, some may still exist.

## 2020-08-06 ENCOUNTER — TELEPHONE (OUTPATIENT)
Dept: PRIMARY CARE CLINIC | Age: 60
End: 2020-08-06

## 2020-08-06 RX ORDER — LIRAGLUTIDE 6 MG/ML
1.8 INJECTION SUBCUTANEOUS DAILY
Qty: 18 ML | Refills: 5 | Status: SHIPPED | OUTPATIENT
Start: 2020-08-06 | End: 2020-08-18 | Stop reason: SDUPTHER

## 2020-08-06 NOTE — TELEPHONE ENCOUNTER
----- Message from JAVAN Sinclair sent at 8/5/2020  4:00 PM CDT -----  Please inform patient of increased A1C to 8.1. She can increase victoza to 1.8 mg. Recheck 3 months.

## 2020-08-18 ENCOUNTER — TELEPHONE (OUTPATIENT)
Dept: PRIMARY CARE CLINIC | Age: 60
End: 2020-08-18

## 2020-08-18 RX ORDER — LIRAGLUTIDE 6 MG/ML
1.8 INJECTION SUBCUTANEOUS DAILY
Qty: 18 ML | Refills: 5 | Status: SHIPPED | OUTPATIENT
Start: 2020-08-18 | End: 2020-10-16 | Stop reason: SDUPTHER

## 2020-10-16 RX ORDER — LIRAGLUTIDE 6 MG/ML
1.8 INJECTION SUBCUTANEOUS DAILY
Qty: 18 ML | Refills: 5 | Status: SHIPPED | OUTPATIENT
Start: 2020-10-16 | End: 2021-03-23

## 2020-10-16 NOTE — TELEPHONE ENCOUNTER
Patient called and reports Victoza not sent to to pharmacy.  Confirmed pharmacy,informed rx sent and confirmed 8-2020   New Rx sent and confirmed today

## 2020-11-05 ENCOUNTER — VIRTUAL VISIT (OUTPATIENT)
Dept: PRIMARY CARE CLINIC | Age: 60
End: 2020-11-05
Payer: COMMERCIAL

## 2020-11-05 PROCEDURE — 99442 PR PHYS/QHP TELEPHONE EVALUATION 11-20 MIN: CPT | Performed by: NURSE PRACTITIONER

## 2020-11-05 ASSESSMENT — ENCOUNTER SYMPTOMS
VOICE CHANGE: 0
BACK PAIN: 0
RHINORRHEA: 0
NAUSEA: 0
PHOTOPHOBIA: 0
COLOR CHANGE: 0
VOMITING: 0
SHORTNESS OF BREATH: 0
COUGH: 0

## 2020-11-05 NOTE — PROGRESS NOTES
[facility]   [address] [place] [address]            Phone:  [phone]  Fax:  [phone]    Heron Richey is a 56 y.o. female evaluated via telephone on 11/5/2020. Consent:    She and/or health care decision maker is aware that that she may receive a bill for this telephone service, depending on her insurance coverage, and has provided verbal consent to proceed: Yes      HPI  Chief Complaint   Patient presents with    Hyperlipidemia    Hypertension    Diabetes       I communicated with the patient and/or health care decision maker about:    Patient presents today for follow-up diabetes, hypertension and hyperlipidemia. She reports blood sugars have been in average range; no current A1C for review. She denies elevated blood pressure or weight gain. She states OA has been well controlled with medication. Review of Systems   Constitutional: Negative for chills and fever. HENT: Negative for ear pain, hearing loss, rhinorrhea and voice change. Eyes: Negative for photophobia and visual disturbance. Respiratory: Negative for cough and shortness of breath. Cardiovascular: Negative for chest pain and palpitations. Gastrointestinal: Negative for nausea and vomiting. Endocrine: Negative. Negative for cold intolerance and heat intolerance. Genitourinary: Negative for difficulty urinating and flank pain. Musculoskeletal: Negative for back pain and neck pain. Skin: Negative for color change and rash. Allergic/Immunologic: Negative for environmental allergies and food allergies. Neurological: Negative for dizziness, speech difficulty and headaches. Hematological: Does not bruise/bleed easily. Psychiatric/Behavioral: Negative for sleep disturbance and suicidal ideas. Patient location: home    PLAN    Details of this discussion including any medical advice provided:     1.  Type 2 diabetes mellitus without complication, without long-term current use of insulin [facility])    - Comprehensive Metabolic Panel; Future  - Hemoglobin A1C; Future  - Lipid Panel; Future  - CBC Auto Differential; Future  - TSH without Reflex; Future  - Microalbumin / Creatinine Urine Ratio; Future    2. Essential hypertension    - Comprehensive Metabolic Panel; Future  - Hemoglobin A1C; Future  - Lipid Panel; Future  - CBC Auto Differential; Future  - TSH without Reflex; Future  - Microalbumin / Creatinine Urine Ratio; Future    3. Mixed hyperlipidemia    - Comprehensive Metabolic Panel; Future  - Hemoglobin A1C; Future  - Lipid Panel; Future  - CBC Auto Differential; Future  - TSH without Reflex; Future  - Microalbumin / Creatinine Urine Ratio; Future    4. Osteoporosis, unspecified osteoporosis type, unspecified pathological fracture presence     I affirm this is a Patient Initiated Episode with an Established Patient who has not had a related appointment within my department in the past 7 days or scheduled within the next 24 hours. Total Time: minutes: 11-20 minutes      Note: not billable if this call serves to triage the patient into an appointment for the relevant concern      [id number] to the emergency declaration under the [facility] waiver authority and the [employer] Act, this Virtual  Visit was conducted, with patient's consent, to reduce the patient's risk of exposure to COVID-19 and provide continuity of care for an established patient.

## 2020-11-11 ENCOUNTER — HOSPITAL ENCOUNTER (OUTPATIENT)
Dept: WOMENS IMAGING | Age: 60
Discharge: HOME | End: 2020-11-11
Payer: COMMERCIAL

## 2020-11-11 PROCEDURE — G0279 TOMOSYNTHESIS, MAMMO: HCPCS

## 2020-12-03 ENCOUNTER — OFFICE VISIT (OUTPATIENT)
Dept: HEMATOLOGY | Age: 60
End: 2020-12-03
Payer: COMMERCIAL

## 2020-12-03 ENCOUNTER — HOSPITAL ENCOUNTER (OUTPATIENT)
Dept: INFUSION THERAPY | Age: 60
Discharge: HOME | End: 2020-12-03
Payer: COMMERCIAL

## 2020-12-03 VITALS
WEIGHT: 144.1 LBS | HEIGHT: 63 IN | SYSTOLIC BLOOD PRESSURE: 126 MMHG | OXYGEN SATURATION: 96 % | BODY MASS INDEX: 25.53 KG/M2 | DIASTOLIC BLOOD PRESSURE: 70 MMHG | TEMPERATURE: 96.9 F | HEART RATE: 90 BPM

## 2020-12-03 DIAGNOSIS — M81.0 OSTEOPOROSIS, UNSPECIFIED OSTEOPOROSIS TYPE, UNSPECIFIED PATHOLOGICAL FRACTURE PRESENCE: ICD-10-CM

## 2020-12-03 DIAGNOSIS — C50.912 INVASIVE DUCTAL CARCINOMA OF BREAST, LEFT (HCC): Primary | ICD-10-CM

## 2020-12-03 LAB
BASOPHILS ABSOLUTE: 0.03 K/UL (ref 0.01–0.08)
BASOPHILS RELATIVE PERCENT: 0.3 % (ref 0.1–1.2)
EOSINOPHILS ABSOLUTE: 0.21 K/UL (ref 0.04–0.54)
EOSINOPHILS RELATIVE PERCENT: 2.4 % (ref 0.7–7)
HCT VFR BLD CALC: 41.6 % (ref 34.1–44.9)
HEMOGLOBIN: 13.7 G/DL (ref 11.2–15.7)
LYMPHOCYTES ABSOLUTE: 3.03 K/UL (ref 1.18–3.74)
LYMPHOCYTES RELATIVE PERCENT: 34.9 % (ref 19.3–53.1)
MCH RBC QN AUTO: 32.6 PG (ref 25.6–32.2)
MCHC RBC AUTO-ENTMCNC: 32.9 G/DL (ref 32.3–35.5)
MCV RBC AUTO: 99 FL (ref 79.4–94.8)
MONOCYTES ABSOLUTE: 0.56 K/UL (ref 0.24–0.82)
MONOCYTES RELATIVE PERCENT: 6.4 % (ref 4.7–12.5)
NEUTROPHILS ABSOLUTE: 4.86 K/UL (ref 1.56–6.13)
NEUTROPHILS RELATIVE PERCENT: 56 % (ref 34–71.1)
PDW BLD-RTO: 12 % (ref 11.7–14.4)
PLATELET # BLD: 187 K/UL (ref 182–369)
PMV BLD AUTO: 10.9 FL (ref 7.4–10.4)
RBC # BLD: 4.2 M/UL (ref 3.93–5.22)
WBC # BLD: 8.69 K/UL (ref 3.98–10.04)

## 2020-12-03 PROCEDURE — 6360000002 HC RX W HCPCS: Performed by: NURSE PRACTITIONER

## 2020-12-03 PROCEDURE — 85025 COMPLETE CBC W/AUTO DIFF WBC: CPT

## 2020-12-03 PROCEDURE — 96372 THER/PROPH/DIAG INJ SC/IM: CPT

## 2020-12-03 PROCEDURE — 99213 OFFICE O/P EST LOW 20 MIN: CPT | Performed by: NURSE PRACTITIONER

## 2020-12-03 RX ORDER — DIPHENHYDRAMINE HYDROCHLORIDE 50 MG/ML
50 INJECTION INTRAMUSCULAR; INTRAVENOUS ONCE
Status: CANCELLED | OUTPATIENT
Start: 2021-06-03

## 2020-12-03 RX ORDER — EPINEPHRINE 1 MG/ML
0.3 INJECTION, SOLUTION, CONCENTRATE INTRAVENOUS PRN
Status: CANCELLED | OUTPATIENT
Start: 2021-06-03

## 2020-12-03 RX ORDER — SODIUM CHLORIDE 9 MG/ML
INJECTION, SOLUTION INTRAVENOUS CONTINUOUS
Status: CANCELLED | OUTPATIENT
Start: 2021-06-03

## 2020-12-03 RX ORDER — METHYLPREDNISOLONE SODIUM SUCCINATE 125 MG/2ML
125 INJECTION, POWDER, LYOPHILIZED, FOR SOLUTION INTRAMUSCULAR; INTRAVENOUS ONCE
Status: CANCELLED | OUTPATIENT
Start: 2021-06-03

## 2020-12-03 RX ORDER — NYSTATIN 100000 [USP'U]/G
POWDER TOPICAL
Qty: 1 BOTTLE | Refills: 2 | Status: SHIPPED | OUTPATIENT
Start: 2020-12-03

## 2020-12-03 RX ADMIN — DENOSUMAB 60 MG: 60 INJECTION SUBCUTANEOUS at 11:50

## 2020-12-03 ASSESSMENT — ENCOUNTER SYMPTOMS
ABDOMINAL PAIN: 0
NAUSEA: 0
EYES NEGATIVE: 1
BACK PAIN: 0
BLOOD IN STOOL: 0
EYE DISCHARGE: 0
RESPIRATORY NEGATIVE: 1
WHEEZING: 0
CONSTIPATION: 0
SORE THROAT: 0
VOMITING: 0
EYE PAIN: 0
EYE REDNESS: 0
COUGH: 0
GASTROINTESTINAL NEGATIVE: 1
DIARRHEA: 0
SHORTNESS OF BREATH: 0

## 2020-12-03 NOTE — PROGRESS NOTES
Progress Note      Pt Name: Grant Man  YOB: 1960  MRN: [id number]    Date of evaluation: 12/3/2020  History Obtained From:  patient, electronic medical record    CHIEF COMPLAINT:    Chief Complaint   Patient presents with    Follow-up     Invasive ductal carcinoma of breast, left ([address]     Other     Osteoporosis     HISTORY OF PRESENT ILLNESS:    Grant Man is a 56 y.o.  female with significant PMH invasive low-grade ductal carcinoma and is status post left lumpectomy and IORT on 9/28/2018. She is also being treated for osteoporosis with Prolia 60 mg subcu every 6 months, last dose delivered on 6/1/2020. Lupe Bautista is currently taking adjuvant endocrine therapy with tamoxifen, previously taking letrozole. She reports at her follow-up appointment with Dr. Caleb Perez on 5/27/2020, he discontinued the letrozole and initiated tamoxifen due to her osteoporosis. Lupe Bautista returns today in scheduled follow-up for evaluation, side effect monitoring, lab monitoring and consideration for 6 months dosing of Prolia. She presents today indicating that she is tolerating the tamoxifen without difficulty, denying any hot flashes or vaginal dryness. Lupe Bautista reported tolerating the previous dose of Prolia without difficulty and reports she continues to take her calcium supplement as recommended. Bilateral diagnostic mammogram on 11/11/2020 documented no mammographic evidence of malignancy. Bilateral breast examination revealed well-healed lumpectomy scar to the left breast with no palpable bilateral subclavicular, axillary or cervical lymphadenopathy. No abnormal masses, nipple changes or concerning skin lesions bilaterally. Mild yeast was noted under the left breast and provided prescription for nystatin powder along with encouraging keeping the area underneath her breast dry.       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 Sofi Marie was seen in initial oncology consultation on 11/20/2018 for a recent diagnosis of invasive low grade ductal carcinoma of the left breast. She is status post left lumpectomy, left sentinel node biopsy, IORT and initiated on Femara by Dr. Veronica Cassidy. Estevan Hart was referred from Dr. Isabel De León to establish care. Estevan Hart presented with no specific complaints and indicated tolerating the Femara without difficulty. The following are pertinent events related to her diagnosis of breast cancer:  7/18/2018- bilateral mammogram documented an area of asymmetrical density deep in the upper lateral right breast is stable. An area of parenchymal distortion and spiculation deep in the lateral left breast, which is lateral and deep or to the area of prior surgery. BI-RADS Category 0   7/24/2018- unilateral coned mammogram of the left breast documented a spiculated 1.1 x 0.8 cm density with regional distortion. Persistent within the left lateral breast near the 2 to 3 o'clock position in the posterior depth. BI-RADS Category 4.   8/1/2018- Ultrasound-guided biopsy of the left breast revealed a 1.1 by 1.0 x 0.74 cm grade invasive mammary carcinoma. ER 99%, TX 21%, HER-2/nancy negative by IHC and Ki-67 is 7% and low. Mamma print low risk. 8/20/2018- MRI of the breast documented postoperative changes in the left breast consistent with the biopsy-proven invasive breast cancer.  No additional sites of suspicious enhancement identified in bilateral breast and no evidence of lymphadenopathy. 8/29/2018- initiated letrozole 2.5 mg daily   9/28/2018- Dr. Wai Coker completed left lumpectomy with IORT. Pathology documented invasive low grade ductal carcinoma with associated intermediate grade ductal carcinoma in situ, pT1b,pNX. Tumor measured 0.9 cm in greatest dimension. Margins of excision were negative. 11/6/2018- [place] lymph node biopsy. 5 lymph nodes negative for metastatic carcinoma  11/20/2018-recommendation to continue letrozole 2.5 mg for a additional 5-year dosing  10/28/2019-bilateral mammogram documented no mammographic evidence of malignancy, BI-RADS Category 2.  5/27/2020-Dr. Wai Coker discontinued the letrozole and initiated tamoxifen due to osteoporosis  6/1/2020-Prolia 60 mg subcu every 6 months initiated for osteoporosis    Past Medical History:    Past Medical History:   Diagnosis Date    Abdominal hernia     Cancer ([facility]     breast cancer    Carotid artery plaque     9/16 <50% rt;  50-69% left    Essential hypertension     H/O abnormal cervical Papanicolaou smear     Mixed hyperlipidemia     Osteopenia     Simple goiter     Type 2 diabetes mellitus without complication ([facility]     Umbilical hernia        Past Surgical History:    Past Surgical History:   Procedure Laterality Date    CHOLECYSTECTOMY  1997    COLONOSCOPY      COLPOSCOPY      6/07 Dr Anitha James, no dysplasia    MA BX/REMV,LYMPH NODE,DEEP AXILL Left 11/6/2018    BREAST BIOPSY SENTINEL NODE performed by Naeem Jaquez MD at [place] [address], PARTIAL Left 9/28/2018    BREAST LUMPECTOMY AND INTRAOPERATIVE ULTRASOUND GUIDED NEEDLE LOCALIZATION AND IORT performed by Naeem Jaquez MD at [address] [place] [address]       Current Medications:    Current Outpatient Medications   Medication Sig Dispense Refill    nystatin (MYCOSTATIN) 180978 UNIT/GM powder Apply 3 times daily.  1 Bottle 2    Liraglutide (VICTOZA) 18 MG/3ML SOPN SC injection Inject 1.8 mg into the skin daily 18 mL 5    irbesartan (AVAPRO) 75 MG tablet TAKE ONE-HALF TABLET BY MOUTH DAILY 120 tablet 1    Insulin Pen Needle 32G X 4 MM MISC 1 each by Does not apply route daily 100 each 3    tamoxifen (NOLVADEX) 20 MG tablet Take 1 tablet by mouth daily 90 tablet 3    montelukast (SINGULAIR) 10 MG tablet Take 1 tablet by mouth daily 90 tablet 3    atorvastatin (LIPITOR) 40 MG tablet TAKE 1 TABLET BY MOUTH EVERY DAY 90 tablet 3    MICROLET LANCETS MISC USE TO TEST ONCE DAILY 100 each 3    Cyanocobalamin (VITAMIN B 12 PO) Take by mouth      BD PEN NEEDLE PRIYANKA U/F 32G X 4 MM MISC USE AS DIRECTED WITH VICTOZA 100 each 5    Nutritional Supplements (DHEA PO) Take by mouth      Calcium Citrate-Vitamin D (CALCIUM CITRATE + D PO) Take by mouth      blood glucose monitor kit and supplies 1 kit by Other route daily Test 1 times a day & as needed for symptoms of irregular blood glucose. 1 kit 0    blood glucose monitor strips Test blood sugars daily. 859 strip 3    folic acid (FOLVITE) 1 MG tablet Take 1 mg by mouth daily      Ascorbic Acid (VITAMIN C) 500 MG tablet Take 500 mg by mouth daily      metFORMIN (GLUCOPHAGE-XR) 750 MG extended release tablet TAKE 1 TABLET BY MOUTH DAILY WITH BREAKFAST 90 tablet 3     No current facility-administered medications for this visit.          Allergies: No Known Allergies    Social History:    Social History     Tobacco Use    Smoking status: Never Smoker    Smokeless tobacco: Never Used   Substance Use Topics    Alcohol use: No    Drug use: No       Family History:   Family History   Problem Relation Age of Onset    Diabetes Father     Heart Disease Father     High Blood Pressure Father     Cancer Father         Pancreatic cancer-  at 80    Coronary Art Dis Father     Coronary Art Dis Mother        Vitals:  Vitals:    20 1105   BP: 126/70   Pulse: 90   Temp: 96.9 °F (36.1 °C)   SpO2: 96%   Weight: 144 lb 1.6 oz (65.4 kg)   Height: 5' 3\" (1.6 m)        Subjective   REVIEW OF SYSTEMS:   Review of Systems   Constitutional: Negative. Negative for chills, diaphoresis and fever. HENT: Negative. Negative for congestion, ear pain, hearing loss, nosebleeds, sore throat and tinnitus. Eyes: Negative. Negative for pain, discharge and redness. Respiratory: Negative. Negative for cough, shortness of breath and wheezing. Cardiovascular: Negative. Negative for chest pain, palpitations and leg swelling. Gastrointestinal: Negative. Negative for abdominal pain, blood in stool, constipation, diarrhea, nausea and vomiting. Endocrine: Negative for polydipsia. Genitourinary: Negative for dysuria, flank pain, frequency, hematuria and urgency. Musculoskeletal: Negative. Negative for back pain, myalgias and neck pain. Skin: Negative. Negative for rash. Neurological: Negative. Negative for dizziness, tremors, seizures, weakness and headaches. Hematological: Does not bruise/bleed easily. Psychiatric/Behavioral: Negative. The patient is not nervous/anxious. Objective   PHYSICAL EXAM:  Physical Exam  Vitals signs reviewed. Constitutional:       General: She is not in acute distress. Appearance: She is well-developed. She is not diaphoretic. HENT:      Head: Normocephalic and atraumatic. Mouth/Throat:      Pharynx: Uvula midline. Tonsils: No tonsillar exudate. Eyes:      General: Lids are normal. No scleral icterus. Right eye: No discharge. Left eye: No discharge. Conjunctiva/sclera: Conjunctivae normal.      Pupils: Pupils are equal, round, and reactive to light. Neck:      Musculoskeletal: Normal range of motion and neck supple. Thyroid: No thyroid mass or thyromegaly. Vascular: No JVD. Trachea: Trachea normal. No tracheal deviation. Cardiovascular:      Rate and Rhythm: Normal rate and regular rhythm. Heart sounds: Normal heart sounds. No murmur. No friction rub. No gallop.     Pulmonary:      Effort: Pulmonary effort is normal. No respiratory distress. Breath sounds: Normal breath sounds. No wheezing or rales. Chest:      Chest wall: No tenderness. Breasts:         Right: No mass, nipple discharge, skin change or tenderness. Left: No mass or nipple discharge. Comments: Well-healed left lumpectomy scar  Erythremia with noted under left breast  Abdominal:      General: Bowel sounds are normal. There is no distension. Palpations: Abdomen is soft. There is no mass. Tenderness: There is no abdominal tenderness. There is no guarding or rebound. Hernia: No hernia is present. Musculoskeletal:         General: No tenderness or deformity. Comments: Range of motion within normal limits x4 extremities   Skin:     General: Skin is warm. Coloration: Skin is not pale. Findings: No erythema or rash. Neurological:      Mental Status: She is alert and oriented to person, place, and time. Cranial Nerves: No cranial nerve deficit. Coordination: Coordination normal.   Psychiatric:         Behavior: Behavior normal.         Thought Content: Thought content normal.         Labs: WBC 8.69, ANC 4.86, hemoglobin 13.7, MCV 99 a platelet count of 237,787  CBC: No results for input(s): WBC, HGB, PLT in the last 72 hours. CMP: No results for input(s): GLUCOSE, BUN, CREATININE, BCR, CO2, CALCIUM, ALBUMIN, LABIL2, ALKPHOS, AST, ALT in the last 72 hours. Invalid input(s): EGFRIFNONA, EGFRIFAFRI, SODIUM, POTASSIUM, CHLORIDE, PROTENTOTREF, GLOBULIN, BILIRUBIN  Hepatic: No results for input(s): AST, ALT, ALB, BILITOT, ALKPHOS in the last 72 hours. Troponin: No results for input(s): TROPONINI in the last 72 hours. BNP: No results for input(s): BNP in the last 72 hours. Lipids: No results for input(s): CHOL, HDL in the last 72 hours. Invalid input(s): LDL  INR: No results for input(s): INR in the last 72 hours.   ABG: No results for input(s): PHART, ZPY0ITP, PO2ART, MBQ6RLY, W9CLKYNV, BEART in the last 72 hours. 30 Day lookback of cultures:    Blood Culture Recent: No results for input(s): BC in the last 720 hours. Gram Stain Recent: No results for input(s): LABGRAM in the last 720 hours. Resp Culture Recent: No results for input(s): CULTRESP in the last 720 hours. Body Fluid Recent : No results for input(s): BFCX in the last 720 hours. MRSA Recent : No results for input(s): [facility] in the last 720 hours. Urine Culture Recent : No results for input(s): LABURIN in the last 720 hours. Organism Recent : No results for input(s): ORG in the last 720 hours. ASSESSMENT/PLAN:      1. Invasive ductal carcinoma of breast, left ([address] status post lumpectomy/sentinel lymph node biopsy and IORT. She was initiated on adjuvant endocrine therapy with letrozole, Dr. Neno Subramanian discontinued letrozole on 5/27/2020 and initiated tamoxifen 20 mg daily due to osteoporosis. Bilateral diagnostic mammogram on 11/11/2020 documented no mammographic evidence of malignancy. No new breast complaints or concerning findings other than mild yeast under the left breast    -Continue tamoxifen, will now need 10-year dosing anticipate completion date to be 8/20/2028  -Continue to follow with Dr. Neno Subramanian as recommended  -Electronic prescription sent for nystatin powder to be placed under breast     2. Postmenopausal state and at increased risk for bone loss due to being on aromatase inhibitor therapy  Bone mineral density study on 5/27/2020 documented osteoporosis with a femoral neck T score of -2.9 and a lumbar spine T score of -2.7. Initiated on Prolia 60 mg subcu every 6 months and continue with oral calcium supplement.  -Prolia 60 mg subcu delivered today  -Continue calcium supplement    3. Essential hypertension, /70, within normal limits. FOLLOW UP:  1. Follow-up appointment given for 6 months, sooner if needed  2.  Continue to follow with other medical providers as recommended    In over 50% of the total visit time of 15 minutes in face to face encounter with the patient, out of which more than 50% of the time was spent in counseling patient  and coordination of care. Counseling included but was not limited to time spent reviewing labs, imaging studies/ treatment plan and answering questions. This does not include charting. Discussed precautions related to [address] and being at increased risk. Discussed proper handwashing to be done frequently, limit exposure to other individuals and maintain social distancing of 6 feet. Recommend contacting primary care provider if having respiratory symptoms for further recommendations and consideration for testing.     (Please note that portions of this note were completed with a voice recognition program. Efforts were made to edit the dictations but occasionally words are mis-transcribed.)    Electronically signed by JAVAN Escobar on 12/9/2020 at 6:37 PM

## 2020-12-04 RX ORDER — METFORMIN HYDROCHLORIDE 750 MG/1
TABLET, EXTENDED RELEASE ORAL
Qty: 90 TABLET | Refills: 3 | Status: SHIPPED | OUTPATIENT
Start: 2020-12-04 | End: 2021-03-23

## 2020-12-11 RX ORDER — ATORVASTATIN CALCIUM 40 MG/1
TABLET, FILM COATED ORAL
Qty: 90 TABLET | Refills: 3 | Status: SHIPPED | OUTPATIENT
Start: 2020-12-11 | End: 2021-11-19 | Stop reason: SDUPTHER

## 2021-02-01 DIAGNOSIS — I10 ESSENTIAL HYPERTENSION: ICD-10-CM

## 2021-02-01 DIAGNOSIS — E11.9 TYPE 2 DIABETES MELLITUS WITHOUT COMPLICATION, WITHOUT LONG-TERM CURRENT USE OF INSULIN (HCC): ICD-10-CM

## 2021-02-01 DIAGNOSIS — E78.2 MIXED HYPERLIPIDEMIA: ICD-10-CM

## 2021-02-01 LAB
ALBUMIN SERPL-MCNC: 4.3 G/DL (ref 3.5–5.2)
ALP BLD-CCNC: 62 U/L (ref 35–104)
ALT SERPL-CCNC: 27 U/L (ref 5–33)
ANION GAP SERPL CALCULATED.3IONS-SCNC: 12 MMOL/L (ref 7–19)
AST SERPL-CCNC: 24 U/L (ref 5–32)
BASOPHILS ABSOLUTE: 0 K/UL (ref 0–0.2)
BASOPHILS RELATIVE PERCENT: 0.6 % (ref 0–1)
BILIRUB SERPL-MCNC: 0.5 MG/DL (ref 0.2–1.2)
BUN BLDV-MCNC: 9 MG/DL (ref 8–23)
CALCIUM SERPL-MCNC: 9.7 MG/DL (ref 8.8–10.2)
CHLORIDE BLD-SCNC: 104 MMOL/L (ref 98–111)
CHOLESTEROL, TOTAL: 124 MG/DL (ref 160–199)
CO2: 26 MMOL/L (ref 22–29)
CREAT SERPL-MCNC: 0.5 MG/DL (ref 0.5–0.9)
CREATININE URINE: 114.4 MG/DL (ref 4.2–622)
EOSINOPHILS ABSOLUTE: 0.2 K/UL (ref 0–0.6)
EOSINOPHILS RELATIVE PERCENT: 2.3 % (ref 0–5)
GFR AFRICAN AMERICAN: >59
GFR NON-AFRICAN AMERICAN: >60
GLUCOSE BLD-MCNC: 158 MG/DL (ref 74–109)
HBA1C MFR BLD: 7.6 % (ref 4–6)
HCT VFR BLD CALC: 41.1 % (ref 37–47)
HDLC SERPL-MCNC: 42 MG/DL (ref 65–121)
HEMOGLOBIN: 13.5 G/DL (ref 12–16)
IMMATURE GRANULOCYTES #: 0 K/UL
LDL CHOLESTEROL CALCULATED: 66 MG/DL
LYMPHOCYTES ABSOLUTE: 2.7 K/UL (ref 1.1–4.5)
LYMPHOCYTES RELATIVE PERCENT: 39 % (ref 20–40)
MCH RBC QN AUTO: 31.3 PG (ref 27–31)
MCHC RBC AUTO-ENTMCNC: 32.8 G/DL (ref 33–37)
MCV RBC AUTO: 95.1 FL (ref 81–99)
MICROALBUMIN UR-MCNC: 10.9 MG/DL (ref 0–19)
MICROALBUMIN/CREAT UR-RTO: 95.3 MG/G
MONOCYTES ABSOLUTE: 0.4 K/UL (ref 0–0.9)
MONOCYTES RELATIVE PERCENT: 5.8 % (ref 0–10)
NEUTROPHILS ABSOLUTE: 3.6 K/UL (ref 1.5–7.5)
NEUTROPHILS RELATIVE PERCENT: 52.2 % (ref 50–65)
PDW BLD-RTO: 12.4 % (ref 11.5–14.5)
PLATELET # BLD: 266 K/UL (ref 130–400)
PMV BLD AUTO: 10.4 FL (ref 9.4–12.3)
POTASSIUM SERPL-SCNC: 4.2 MMOL/L (ref 3.5–5)
RBC # BLD: 4.32 M/UL (ref 4.2–5.4)
SODIUM BLD-SCNC: 142 MMOL/L (ref 136–145)
TOTAL PROTEIN: 7 G/DL (ref 6.6–8.7)
TRIGL SERPL-MCNC: 79 MG/DL (ref 0–149)
TSH SERPL DL<=0.05 MIU/L-ACNC: 1.08 UIU/ML (ref 0.27–4.2)
WBC # BLD: 6.9 K/UL (ref 4.8–10.8)

## 2021-02-09 ENCOUNTER — OFFICE VISIT (OUTPATIENT)
Dept: PRIMARY CARE CLINIC | Age: 61
End: 2021-02-09
Payer: COMMERCIAL

## 2021-02-09 VITALS
DIASTOLIC BLOOD PRESSURE: 76 MMHG | HEIGHT: 63 IN | TEMPERATURE: 97.7 F | RESPIRATION RATE: 16 BRPM | HEART RATE: 79 BPM | SYSTOLIC BLOOD PRESSURE: 124 MMHG | WEIGHT: 144.4 LBS | OXYGEN SATURATION: 98 % | BODY MASS INDEX: 25.59 KG/M2

## 2021-02-09 DIAGNOSIS — I10 ESSENTIAL HYPERTENSION: ICD-10-CM

## 2021-02-09 DIAGNOSIS — E78.2 MIXED HYPERLIPIDEMIA: ICD-10-CM

## 2021-02-09 DIAGNOSIS — E11.9 TYPE 2 DIABETES MELLITUS WITHOUT COMPLICATION, WITHOUT LONG-TERM CURRENT USE OF INSULIN (HCC): Primary | ICD-10-CM

## 2021-02-09 DIAGNOSIS — M81.0 OSTEOPOROSIS, UNSPECIFIED OSTEOPOROSIS TYPE, UNSPECIFIED PATHOLOGICAL FRACTURE PRESENCE: ICD-10-CM

## 2021-02-09 PROCEDURE — 99214 OFFICE O/P EST MOD 30 MIN: CPT | Performed by: NURSE PRACTITIONER

## 2021-02-09 PROCEDURE — 3051F HG A1C>EQUAL 7.0%<8.0%: CPT | Performed by: NURSE PRACTITIONER

## 2021-02-09 SDOH — ECONOMIC STABILITY: FOOD INSECURITY: WITHIN THE PAST 12 MONTHS, YOU WORRIED THAT YOUR FOOD WOULD RUN OUT BEFORE YOU GOT MONEY TO BUY MORE.: NEVER TRUE

## 2021-02-09 SDOH — ECONOMIC STABILITY: FOOD INSECURITY: WITHIN THE PAST 12 MONTHS, THE FOOD YOU BOUGHT JUST DIDN'T LAST AND YOU DIDN'T HAVE MONEY TO GET MORE.: NEVER TRUE

## 2021-02-09 ASSESSMENT — ENCOUNTER SYMPTOMS
SHORTNESS OF BREATH: 0
BACK PAIN: 0
NAUSEA: 0
RHINORRHEA: 0
VOICE CHANGE: 0
COUGH: 0
VOMITING: 0
PHOTOPHOBIA: 0
COLOR CHANGE: 0

## 2021-02-09 ASSESSMENT — PATIENT HEALTH QUESTIONNAIRE - PHQ9
SUM OF ALL RESPONSES TO PHQ QUESTIONS 1-9: 0
SUM OF ALL RESPONSES TO PHQ9 QUESTIONS 1 & 2: 0
SUM OF ALL RESPONSES TO PHQ QUESTIONS 1-9: 0
SUM OF ALL RESPONSES TO PHQ QUESTIONS 1-9: 0

## 2021-02-09 NOTE — PROGRESS NOTES
[address] [facility]  [address] [place] [address]  [address] [address]  Dept: [phone]  Dept Fax: [phone]  Loc: [phone]    Sanket Garces is a 59 y.o. female who presents today for her medical conditions/complaints as noted below. Sanket Garces is c/o of Hyperlipidemia (seems to be doing well; no complaints at this time)      HPI:     HPI   Chief Complaint   Patient presents with    Hyperlipidemia     seems to be doing well; no complaints at this time     Patient presents today for follow-up hyperlipidemia, diabetes, osteoporosis. She reports blood sugars have been stable. Her A1C is current 7.6 which has improved from 8.1. Lipids in normal range. She has history of breast cancer and is routinely followed by her oncologist. She denies any complaints today.      Lab Results   Component Value Date    LABA1C 7.6 (H) 02/01/2021     No results found for: EAG      Past Medical History:   Diagnosis Date    Abdominal hernia     Cancer ([address]     breast cancer    Carotid artery plaque     9/16 <50% rt;  50-69% left    Essential hypertension     H/O abnormal cervical Papanicolaou smear     Mixed hyperlipidemia     Osteopenia     Simple goiter     Type 2 diabetes mellitus without complication ([address]     Umbilical hernia       Past Surgical History:   Procedure Laterality Date    CHOLECYSTECTOMY  1997    COLONOSCOPY      COLPOSCOPY      6/07 Dr Marcello Vieyra, no dysplasia    [place] BX/REMV,LYMPH NODE,DEEP AXILL Left 11/6/2018    BREAST BIOPSY SENTINEL NODE performed by Nieves Beltran MD at [facility], PARTIAL Left 9/28/2018    BREAST LUMPECTOMY AND INTRAOPERATIVE ULTRASOUND GUIDED NEEDLE LOCALIZATION AND IORT performed by Nieves Beltran MD at [address] [facility] 2/9/2021 12/3/2020 8/4/2020 5/27/2020 5/8/2020 9/6/3675   SYSTOLIC 998 884 [phone]   DIASTOLIC 76 70 64 70 62 62   Site - - - Right Upper Arm - Right Upper Arm Position - - - Sitting - Sitting   Cuff Size - - - Medium Adult - -   Pulse 79 90 83 80 75 77   Temp 97.7 96.9 98 - 98.9 98   Resp 16 - - - - -   SpO2 98 96 98 - 98 97   Weight 144 lb 6.4 oz 144 lb 1.6 oz 146 lb - 145 lb 14.4 oz 146 lb   Height 5' 3\" 5' 3\" 5' 3\" - 5' 3\" 5' 3\"   Body mass index 25.58 kg/m2 25.52 kg/m2 25.86 kg/m2 - 25.84 kg/m2 25.86 kg/m2   Some recent data might be hidden       Family History   Problem Relation Age of Onset    Diabetes Father     Heart Disease Father     High Blood Pressure Father     Cancer Father         Pancreatic cancer-  at 80    Coronary Art Dis Father     Coronary Art Dis Mother        Social History     Tobacco Use    Smoking status: Never Smoker    Smokeless tobacco: Never Used   Substance Use Topics    Alcohol use: No      Current Outpatient Medications   Medication Sig Dispense Refill    atorvastatin (LIPITOR) 40 MG tablet TAKE 1 TABLET BY MOUTH DAILY 90 tablet 3    metFORMIN (GLUCOPHAGE-XR) 750 MG extended release tablet TAKE 1 TABLET BY MOUTH DAILY WITH BREAKFAST 90 tablet 3    nystatin (MYCOSTATIN) 249934 UNIT/GM powder Apply 3 times daily.  1 Bottle 2    Liraglutide (VICTOZA) 18 MG/3ML SOPN SC injection Inject 1.8 mg into the skin daily 18 mL 5    irbesartan (AVAPRO) 75 MG tablet TAKE ONE-HALF TABLET BY MOUTH DAILY 120 tablet 1    Insulin Pen Needle 32G X 4 MM MISC 1 each by Does not apply route daily 100 each 3    tamoxifen (NOLVADEX) 20 MG tablet Take 1 tablet by mouth daily 90 tablet 3    MICROLET LANCETS MISC USE TO TEST ONCE DAILY 100 each 3    Cyanocobalamin (VITAMIN B 12 PO) Take by mouth      BD PEN NEEDLE PRIYANKA U/F 32G X 4 MM MISC USE AS DIRECTED WITH VICTOZA 100 each 5    Nutritional Supplements (DHEA PO) Take by mouth      Calcium Citrate-Vitamin D (CALCIUM CITRATE + D PO) Take by mouth Allergic/Immunologic: Negative for environmental allergies and food allergies. Neurological: Negative for dizziness, speech difficulty and headaches. Hematological: Does not bruise/bleed easily. Psychiatric/Behavioral: Negative for sleep disturbance and suicidal ideas. Objective:     Physical Exam  Vitals signs and nursing note reviewed. Constitutional:       Appearance: She is well-developed. HENT:      Head: Atraumatic. Right Ear: External ear normal.      Left Ear: External ear normal.      Nose: Nose normal.   Eyes:      Conjunctiva/sclera: Conjunctivae normal.      Pupils: Pupils are equal, round, and reactive to light. Neck:      Musculoskeletal: Normal range of motion and neck supple. Cardiovascular:      Rate and Rhythm: Normal rate and regular rhythm. Heart sounds: Normal heart sounds, S1 normal and S2 normal.   Pulmonary:      Effort: Pulmonary effort is normal.      Breath sounds: Normal breath sounds. Abdominal:      General: Bowel sounds are normal.      Palpations: Abdomen is soft. Musculoskeletal: Normal range of motion. Skin:     General: Skin is warm and dry. Neurological:      Mental Status: She is alert and oriented to person, place, and time.    Psychiatric:         Behavior: Behavior normal.     Visual inspection:  Deformity/amputation: absent  Skin lesions/pre-ulcerative calluses: absent  Edema: right- negative, left- negative    Sensory exam:  Monofilament sensation: normal  (minimum of 5 random plantar locations tested, avoiding callused areas - > 1 area with absence of sensation is + for neuropathy)    Plus at least one of the following:  Pulses: normal,   Pinprick: Intact    /76   Pulse 79   Temp 97.7 °F (36.5 °C) (Temporal)   Resp 16   Ht 5' 3\" (1.6 m)   Wt 144 lb 6.4 oz (65.5 kg)   SpO2 98%   BMI 25.58 kg/m²     Assessment:       Diagnosis Orders 1. Type 2 diabetes mellitus without complication, without long-term current use of insulin (HCC)  Diabetic Foot Exam    CBC Auto Differential    Comprehensive Metabolic Panel    Hemoglobin A1C    Lipid Panel    TSH without Reflex   2. Mixed hyperlipidemia     3. Osteoporosis, unspecified osteoporosis type, unspecified pathological fracture presence     4. Essential hypertension           Plan:   More than 50% of the time was spent counseling and coordinating care for a total time of 30 min face to face. Patient received approximately 5 minutes of counseling on COVID-19 pandemic in regards to hygiene, symptoms, following public guidelines, and precautions to take. Patient received additional 5 minutes of counseling on covid vaccination data and need to vaccinate when available. She will follow-up in 6 months or sooner if needed. Patient given educational materials -see patient instructions. Discussed use, benefit, and side effects of prescribed medications. All patient questions answered. Pt voiced understanding. Reviewed health maintenance. Instructed to continue currentmedications, diet and exercise. Patient agreed with treatment plan. Follow up as directed. MEDICATIONS:  No orders of the defined types were placed in this encounter. ORDERS:  Orders Placed This Encounter   Procedures    CBC Auto Differential    Comprehensive Metabolic Panel    Hemoglobin A1C    Lipid Panel    TSH without Reflex    Diabetic Foot Exam       Follow-up:  Return in about 6 months (around 8/9/2021) for in office, follow-up with labs. PATIENT INSTRUCTIONS:  Patient Instructions   We are committed to providing you with the best care possible. In order to help us achieve these goals please remember to bring all medications, herbal products, and over the counter supplements with you to each visit. If your provider has ordered testing for you, please be sure to follow up with our office if you have not received results within 7 days after the testing took place. *If you receive a survey after visiting one of our offices, please take time to share your experience concerning your physician office visit. These surveys are confidential and no health information about you is shared. We are eager to improve for you and we are counting on your feedback to help make that happen. Electronically signed by JAVAN Garland on 2/25/2021 at 8:58 AM    EMR Dragon/transcription disclaimer:  Much of thisencounter note is electronic transcription/translation of spoken language to printed texts. The electronic translation of spoken language may be erroneous, or at times, nonsensical words or phrases may be inadvertentlytranscribed.   Although I have reviewed the note for such errors, some may still exist.

## 2021-03-23 ENCOUNTER — TELEPHONE (OUTPATIENT)
Dept: PRIMARY CARE CLINIC | Age: 61
End: 2021-03-23

## 2021-03-23 DIAGNOSIS — E11.9 TYPE 2 DIABETES MELLITUS WITHOUT COMPLICATION, WITHOUT LONG-TERM CURRENT USE OF INSULIN (HCC): ICD-10-CM

## 2021-03-23 RX ORDER — METFORMIN HYDROCHLORIDE 750 MG/1
TABLET, EXTENDED RELEASE ORAL
Qty: 90 TABLET | Refills: 3
Start: 2021-03-23 | End: 2021-07-20 | Stop reason: ALTCHOICE

## 2021-03-23 RX ORDER — LIRAGLUTIDE 6 MG/ML
1.2 INJECTION SUBCUTANEOUS DAILY
Qty: 18 ML | Refills: 5
Start: 2021-03-23 | End: 2022-02-14 | Stop reason: SDUPTHER

## 2021-03-23 NOTE — TELEPHONE ENCOUNTER
I agree with the decrease in her medications; 60 is too low for BS. Would recommend she monitor glucose with new doses and in 2 weeks we can do a follow-up.

## 2021-03-23 NOTE — TELEPHONE ENCOUNTER
Patient called stating she has been doing a diabetic diet for about 20 days now. Her blood sugars have been ranging between . She states she has cut her Metformin in half and has went back down to 1.2mg on her Victoza. Informed patient she made need a sooner appointment for a follow up. Please advise if needed or if any changes need to be made.

## 2021-04-12 ENCOUNTER — VIRTUAL VISIT (OUTPATIENT)
Dept: PRIMARY CARE CLINIC | Age: 61
End: 2021-04-12
Payer: COMMERCIAL

## 2021-04-12 DIAGNOSIS — E11.9 TYPE 2 DIABETES MELLITUS WITHOUT COMPLICATION, WITHOUT LONG-TERM CURRENT USE OF INSULIN (HCC): Primary | ICD-10-CM

## 2021-04-12 PROCEDURE — 99443 PR PHYS/QHP TELEPHONE EVALUATION 21-30 MIN: CPT | Performed by: NURSE PRACTITIONER

## 2021-04-12 ASSESSMENT — ENCOUNTER SYMPTOMS
NAUSEA: 0
RHINORRHEA: 0
BACK PAIN: 0
COUGH: 0
VOICE CHANGE: 0
PHOTOPHOBIA: 0
COLOR CHANGE: 0
VOMITING: 0
SHORTNESS OF BREATH: 0

## 2021-04-12 NOTE — PROGRESS NOTES
[address] [address]            Phone:  [phone]  Fax:  [phone]    Jose Alfredo Avalos is a 59 y.o. female evaluated via telephone on 4/12/2021. Consent:    She and/or health care decision maker is aware that that she may receive a bill for this telephone service, depending on her insurance coverage, and has provided verbal consent to proceed: Yes      HPI  Chief Complaint   Patient presents with    Diabetes       Patient presents today for diabetes follow-up. She states for 40 days now she has been following a strict diabetic diet. Her highest glucose level was 131. She has lost a total of approximately 10lbs. She has started a book called \"Diabetes [place]\" and reports this has helped her significantly with managing her diabetes. She is now taking 500 mg metformin once daily; she is taking 1.2 mg victoza     Review of Systems   Constitutional: Negative for chills and fever. HENT: Negative for ear pain, hearing loss, rhinorrhea and voice change. Eyes: Negative for photophobia and visual disturbance. Respiratory: Negative for cough and shortness of breath. Cardiovascular: Negative for chest pain and palpitations. Gastrointestinal: Negative for nausea and vomiting. Endocrine: Negative. Negative for cold intolerance and heat intolerance. Genitourinary: Negative for difficulty urinating and flank pain. Musculoskeletal: Negative for back pain and neck pain. Skin: Negative for color change and rash. Allergic/Immunologic: Negative for environmental allergies and food allergies. Neurological: Negative for dizziness, speech difficulty and headaches. Hematological: Does not bruise/bleed easily. Psychiatric/Behavioral: Negative for sleep disturbance and suicidal ideas. Patient location: home    PLAN    Details of this discussion including any medical advice provided:     1.  Type 2 diabetes mellitus without complication, without long-term current use of insulin ([facility] [address]  - Patient is doing very well on current medications and new diet regimen. We will have her come in 3 months and recheck A1C; if improving we can consider coming off victoza. I affirm this is a Patient Initiated Episode with an Established Patient who has not had a related appointment within my department in the past 7 days or scheduled within the next 24 hours. Total Time: minutes: 21-30 minutes      Note: not billable if this call serves to triage the patient into an appointment for the relevant concern      [address] to the emergency declaration under the [facility] [address] waiver authority and the "[employer] and [employer] Act, this Virtual  Visit was conducted, with patient's consent, to reduce the patient's risk of exposure to COVID-19 and provide continuity of care for an established patient.

## 2021-05-13 ENCOUNTER — OFFICE VISIT (OUTPATIENT)
Dept: PRIMARY CARE CLINIC | Age: 61
End: 2021-05-13
Payer: COMMERCIAL

## 2021-05-13 VITALS
OXYGEN SATURATION: 98 % | TEMPERATURE: 98.1 F | WEIGHT: 134.6 LBS | SYSTOLIC BLOOD PRESSURE: 122 MMHG | BODY MASS INDEX: 23.85 KG/M2 | DIASTOLIC BLOOD PRESSURE: 70 MMHG | HEIGHT: 63 IN | HEART RATE: 73 BPM

## 2021-05-13 DIAGNOSIS — E11.9 TYPE 2 DIABETES MELLITUS WITHOUT COMPLICATION, WITHOUT LONG-TERM CURRENT USE OF INSULIN (HCC): Primary | ICD-10-CM

## 2021-05-13 DIAGNOSIS — E11.9 TYPE 2 DIABETES MELLITUS WITHOUT COMPLICATION, WITHOUT LONG-TERM CURRENT USE OF INSULIN (HCC): ICD-10-CM

## 2021-05-13 LAB — HBA1C MFR BLD: 6.7 % (ref 4–6)

## 2021-05-13 PROCEDURE — 99213 OFFICE O/P EST LOW 20 MIN: CPT | Performed by: NURSE PRACTITIONER

## 2021-05-13 RX ORDER — TAMOXIFEN CITRATE 20 MG/1
20 TABLET ORAL DAILY
Qty: 90 TABLET | Refills: 3 | Status: SHIPPED | OUTPATIENT
Start: 2021-05-13 | End: 2021-11-23 | Stop reason: SDUPTHER

## 2021-05-13 RX ORDER — MONTELUKAST SODIUM 10 MG/1
10 TABLET ORAL DAILY
Qty: 90 TABLET | Refills: 3 | Status: SHIPPED | OUTPATIENT
Start: 2021-05-13 | End: 2021-11-23 | Stop reason: SDUPTHER

## 2021-05-13 RX ORDER — METFORMIN HYDROCHLORIDE 500 MG/1
500 TABLET, EXTENDED RELEASE ORAL
Qty: 90 TABLET | Refills: 1 | Status: SHIPPED | OUTPATIENT
Start: 2021-05-13 | End: 2021-11-19 | Stop reason: SDUPTHER

## 2021-05-13 ASSESSMENT — ENCOUNTER SYMPTOMS
BACK PAIN: 0
RHINORRHEA: 0
COLOR CHANGE: 0
PHOTOPHOBIA: 0
COUGH: 0
VOICE CHANGE: 0
SHORTNESS OF BREATH: 0
NAUSEA: 0
VOMITING: 0

## 2021-05-13 NOTE — PROGRESS NOTES
[address] [facility]  [address] [facility] [address] [address]  Dept: [phone]  Dept Fax: [phone]  Loc: [phone]    Joceline Briggs is a 59 y.o. female who presents today for her medical conditions/complaints as noted below. Joceline Briggs is c/o of Diabetes (1 month follow up, patient states she is doing well and is taking less victoza and half tablet of metformin)        HPI:     HPI   Chief Complaint   Patient presents with    Diabetes     1 month follow up, patient states she is doing well and is taking less victoza and half tablet of metformin     Patient presents today for one month follow-up for diabetes. She states blood sugar ranges low 100s typically. She is taking 1.2 mg Victoza and half tablet of metformin. Patient has had a 10lb weight loss since last month. She is making better food choices. She continues on her diet regimen. A1C has improved from 7.6 in February to 6.7 today.      Past Medical History:   Diagnosis Date    Abdominal hernia     Cancer [facility])     breast cancer    Carotid artery plaque     9/16 <50% rt;  50-69% left    Essential hypertension     H/O abnormal cervical Papanicolaou smear     Mixed hyperlipidemia     Osteopenia     Simple goiter     Type 2 diabetes mellitus without complication ([address]     Umbilical hernia       Past Surgical History:   Procedure Laterality Date    CHOLECYSTECTOMY  1997    COLONOSCOPY      COLPOSCOPY      6/07 Dr Radha Lanza, no dysplasia    DC BX/REMV,LYMPH NODE,DEEP AXILL Left 11/6/2018    BREAST BIOPSY SENTINEL NODE performed by Jose Encarnacion MD at [facility], PARTIAL Left 9/28/2018    BREAST LUMPECTOMY AND INTRAOPERATIVE ULTRASOUND GUIDED NEEDLE LOCALIZATION AND IORT performed by Jose Encarnacion MD at [address] [facility] 5/13/2021 2/9/2021 12/3/2020 8/4/2020 5/27/2020 4/7/8980   SYSTOLIC 221 188 483 162 530 567   DIASTOLIC 70 76 70 64 70 62   Site Right Upper Arm - - - Right Upper Arm -   Position - - - - Sitting -   Cuff Size - - - - Medium Adult -   Pulse 73 79 90 83 80 75   Temp 98.1 97.7 96.9 98 - 98.9   Resp - 16 - - - -   SpO2 98 98 96 98 - 98   Weight 134 lb 9.6 oz 144 lb 6.4 oz 144 lb 1.6 oz 146 lb - 145 lb 14.4 oz   Height 5' 3\" 5' 3\" 5' 3\" 5' 3\" - 5' 3\"   Body mass index 23.84 kg/m2 25.58 kg/m2 25.52 kg/m2 25.86 kg/m2 - 25.84 kg/m2   Some recent data might be hidden       Family History   Problem Relation Age of Onset    Diabetes Father     Heart Disease Father     High Blood Pressure Father     Cancer Father         Pancreatic cancer-  at 80    Coronary Art Dis Father     Coronary Art Dis Mother        Social History     Tobacco Use    Smoking status: Never Smoker    Smokeless tobacco: Never Used   Substance Use Topics    Alcohol use: No      Current Outpatient Medications on File Prior to Visit   Medication Sig Dispense Refill    metFORMIN (GLUCOPHAGE-XR) 750 MG extended release tablet TAKE 1/2 TABLET BY MOUTH DAILY WITH BREAKFAST 90 tablet 3    Liraglutide (VICTOZA) 18 MG/3ML SOPN SC injection Inject 1.2 mg into the skin daily 18 mL 5    atorvastatin (LIPITOR) 40 MG tablet TAKE 1 TABLET BY MOUTH DAILY 90 tablet 3    nystatin (MYCOSTATIN) 345576 UNIT/GM powder Apply 3 times daily. 1 Bottle 2    irbesartan (AVAPRO) 75 MG tablet TAKE ONE-HALF TABLET BY MOUTH DAILY 120 tablet 1    Insulin Pen Needle 32G X 4 MM MISC 1 each by Does not apply route daily 100 each 3    MICROLET LANCETS MISC USE TO TEST ONCE DAILY 100 each 3    Cyanocobalamin (VITAMIN B 12 PO) Take by mouth      BD PEN NEEDLE PRIYANKA U/F 32G X 4 MM MISC USE AS DIRECTED WITH VICTOZA 100 each 5    Nutritional Supplements (DHEA PO) Take by mouth      Calcium Citrate-Vitamin D (CALCIUM CITRATE + D PO) Take by mouth      blood glucose monitor kit and supplies 1 kit by Other route daily Test 1 times a day & as needed for symptoms of irregular blood glucose.  1 kit 0    blood glucose monitor strips Test blood sugars daily. 752 strip 3    folic acid (FOLVITE) 1 MG tablet Take 1 mg by mouth daily      Ascorbic Acid (VITAMIN C) 500 MG tablet Take 500 mg by mouth daily       No current facility-administered medications on file prior to visit. No Known Allergies    Health Maintenance   Topic Date Due    Hepatitis C screen  Never done    COVID-19 Vaccine (1) Never done    HIV screen  Never done    Shingles Vaccine (2 of 3) 05/14/2012    Diabetic retinal exam  06/28/2018    Breast cancer screen  11/11/2021    Diabetic microalbuminuria test  02/01/2022    Lipid screen  02/01/2022    Potassium monitoring  02/01/2022    Creatinine monitoring  02/01/2022    Diabetic foot exam  02/09/2022    A1C test (Diabetic or Prediabetic)  05/13/2022    Colon cancer screen colonoscopy  07/16/2023    Cervical cancer screen  01/08/2025    DTaP/Tdap/Td vaccine (2 - Td) 08/07/2028    Flu vaccine  Completed    Pneumococcal 0-64 years Vaccine  Completed    Hepatitis A vaccine  Aged Out    Hib vaccine  Aged Out    Meningococcal (ACWY) vaccine  Aged Out       Subjective:      Review of Systems   Constitutional: Negative for chills and fever. HENT: Negative for ear pain, hearing loss, rhinorrhea and voice change. Eyes: Negative for photophobia and visual disturbance. Respiratory: Negative for cough and shortness of breath. Cardiovascular: Negative for chest pain and palpitations. Gastrointestinal: Negative for nausea and vomiting. Endocrine: Negative. Negative for cold intolerance and heat intolerance. Genitourinary: Negative for difficulty urinating and flank pain. Musculoskeletal: Negative for back pain and neck pain. Skin: Negative for color change and rash. Allergic/Immunologic: Negative for environmental allergies and food allergies. Neurological: Negative for dizziness, speech difficulty and headaches. Hematological: Does not bruise/bleed easily.    Psychiatric/Behavioral: Negative for sleep disturbance and suicidal ideas. Objective:     Physical Exam  Vitals signs and nursing note reviewed. Constitutional:       Appearance: She is well-developed. HENT:      Head: Atraumatic. Right Ear: External ear normal.      Left Ear: External ear normal.      Nose: Nose normal.   Eyes:      Conjunctiva/sclera: Conjunctivae normal.      Pupils: Pupils are equal, round, and reactive to light. Neck:      Musculoskeletal: Normal range of motion and neck supple. Cardiovascular:      Rate and Rhythm: Normal rate and regular rhythm. Heart sounds: Normal heart sounds, S1 normal and S2 normal.   Pulmonary:      Effort: Pulmonary effort is normal.      Breath sounds: Normal breath sounds. Abdominal:      General: Bowel sounds are normal.      Palpations: Abdomen is soft. Musculoskeletal: Normal range of motion. Skin:     General: Skin is warm and dry. Neurological:      Mental Status: She is alert and oriented to person, place, and time. Psychiatric:         Behavior: Behavior normal.       /70 (Site: Right Upper Arm)   Pulse 73   Temp 98.1 °F (36.7 °C)   Ht 5' 3\" (1.6 m)   Wt 134 lb 9.6 oz (61.1 kg)   SpO2 98%   BMI 23.84 kg/m²     Assessment:       Diagnosis Orders   1. Type 2 diabetes mellitus without complication, without long-term current use of insulin ([address]           Plan:     Patient is having excellent success with glycemic control with diet. She would like to eventually get off Metformin because she is leary of side effects of it. She is currently taking 1/2 tablet of the 750 mg tablet; we will change her to 500 mg tablet and she can cut this in half and monitor blood sugars. We will see her back in 3 months to recheck A1C. Patient given educational materials -see patient instructions. Discussed use, benefit, and side effects of prescribed medications. All patient questions answered. Pt voiced understanding. Reviewed health maintenance.   Instructed to

## 2021-06-03 ENCOUNTER — HOSPITAL ENCOUNTER (OUTPATIENT)
Dept: INFUSION THERAPY | Age: 61
Discharge: HOME | End: 2021-06-03
Payer: COMMERCIAL

## 2021-06-03 ENCOUNTER — OFFICE VISIT (OUTPATIENT)
Dept: HEMATOLOGY | Age: 61
End: 2021-06-03
Payer: COMMERCIAL

## 2021-06-03 VITALS
SYSTOLIC BLOOD PRESSURE: 124 MMHG | OXYGEN SATURATION: 98 % | HEART RATE: 75 BPM | BODY MASS INDEX: 23.94 KG/M2 | HEIGHT: 63 IN | WEIGHT: 135.1 LBS | DIASTOLIC BLOOD PRESSURE: 62 MMHG

## 2021-06-03 DIAGNOSIS — I10 ESSENTIAL HYPERTENSION: ICD-10-CM

## 2021-06-03 DIAGNOSIS — C50.812 MALIGNANT NEOPLASM OF OVERLAPPING SITES OF LEFT BREAST IN FEMALE, ESTROGEN RECEPTOR POSITIVE (HCC): Primary | ICD-10-CM

## 2021-06-03 DIAGNOSIS — Z51.81 ENCOUNTER FOR MONITORING TAMOXIFEN THERAPY: ICD-10-CM

## 2021-06-03 DIAGNOSIS — Z79.810 ENCOUNTER FOR MONITORING TAMOXIFEN THERAPY: ICD-10-CM

## 2021-06-03 DIAGNOSIS — Z78.0 POSTMENOPAUSAL STATE: ICD-10-CM

## 2021-06-03 DIAGNOSIS — Z17.0 MALIGNANT NEOPLASM OF OVERLAPPING SITES OF LEFT BREAST IN FEMALE, ESTROGEN RECEPTOR POSITIVE (HCC): Primary | ICD-10-CM

## 2021-06-03 DIAGNOSIS — M81.0 OSTEOPOROSIS WITHOUT CURRENT PATHOLOGICAL FRACTURE, UNSPECIFIED OSTEOPOROSIS TYPE: ICD-10-CM

## 2021-06-03 DIAGNOSIS — C50.912 INVASIVE DUCTAL CARCINOMA OF BREAST, LEFT (HCC): Primary | ICD-10-CM

## 2021-06-03 DIAGNOSIS — M81.0 OSTEOPOROSIS, UNSPECIFIED OSTEOPOROSIS TYPE, UNSPECIFIED PATHOLOGICAL FRACTURE PRESENCE: ICD-10-CM

## 2021-06-03 LAB
BASOPHILS ABSOLUTE: 0.02 K/UL (ref 0.01–0.08)
BASOPHILS RELATIVE PERCENT: 0.3 % (ref 0.1–1.2)
EOSINOPHILS ABSOLUTE: 0.18 K/UL (ref 0.04–0.54)
EOSINOPHILS RELATIVE PERCENT: 2.4 % (ref 0.7–7)
HCT VFR BLD CALC: 42.1 % (ref 34.1–44.9)
HEMOGLOBIN: 13.7 G/DL (ref 11.2–15.7)
LYMPHOCYTES ABSOLUTE: 2.58 K/UL (ref 1.18–3.74)
LYMPHOCYTES RELATIVE PERCENT: 33.8 % (ref 19.3–53.1)
MCH RBC QN AUTO: 32 PG (ref 25.6–32.2)
MCHC RBC AUTO-ENTMCNC: 32.5 G/DL (ref 32.3–35.5)
MCV RBC AUTO: 98.4 FL (ref 79.4–94.8)
MONOCYTES ABSOLUTE: 0.53 K/UL (ref 0.24–0.82)
MONOCYTES RELATIVE PERCENT: 6.9 % (ref 4.7–12.5)
NEUTROPHILS ABSOLUTE: 4.33 K/UL (ref 1.56–6.13)
NEUTROPHILS RELATIVE PERCENT: 56.6 % (ref 34–71.1)
PDW BLD-RTO: 12.1 % (ref 11.7–14.4)
PLATELET # BLD: 182 K/UL (ref 182–369)
PMV BLD AUTO: 10.7 FL (ref 7.4–10.4)
RBC # BLD: 4.28 M/UL (ref 3.93–5.22)
WBC # BLD: 7.64 K/UL (ref 3.98–10.04)

## 2021-06-03 PROCEDURE — 96372 THER/PROPH/DIAG INJ SC/IM: CPT

## 2021-06-03 PROCEDURE — 6360000002 HC RX W HCPCS: Performed by: NURSE PRACTITIONER

## 2021-06-03 PROCEDURE — G0463 HOSPITAL OUTPT CLINIC VISIT: HCPCS

## 2021-06-03 PROCEDURE — 85025 COMPLETE CBC W/AUTO DIFF WBC: CPT

## 2021-06-03 PROCEDURE — 99213 OFFICE O/P EST LOW 20 MIN: CPT | Performed by: NURSE PRACTITIONER

## 2021-06-03 PROCEDURE — 99211 OFF/OP EST MAY X REQ PHY/QHP: CPT

## 2021-06-03 RX ADMIN — DENOSUMAB 60 MG: 60 INJECTION SUBCUTANEOUS at 10:59

## 2021-06-03 ASSESSMENT — ENCOUNTER SYMPTOMS
GASTROINTESTINAL NEGATIVE: 1
BLOOD IN STOOL: 0
CONSTIPATION: 0
ABDOMINAL PAIN: 0
RESPIRATORY NEGATIVE: 1
COUGH: 0
BACK PAIN: 0
SHORTNESS OF BREATH: 0
DIARRHEA: 0
EYES NEGATIVE: 1
VOMITING: 0
EYE DISCHARGE: 0
NAUSEA: 0
EYE REDNESS: 0
SORE THROAT: 0
WHEEZING: 0
EYE PAIN: 0

## 2021-06-03 NOTE — PROGRESS NOTES
Progress Note      Pt Name: Merry Garcia  YOB: 1960  MRN: [id number]    Date of evaluation: 6/3/2021     History Obtained From:  patient, electronic medical record    CHIEF COMPLAINT:    Chief Complaint   Patient presents with    Follow-up     Invasive ductal carcinoma of breast, left ([address]    Treatment     Prolia     HISTORY OF PRESENT ILLNESS:    Merry Garcia is a 59 y.o.  female with significant PMH invasive low-grade ductal carcinoma and is status post left lumpectomy and IORT on 9/28/2018. She is also being treated for osteoporosis with Prolia 60 mg subcu every 6 months, last dose delivered on 12/3/2020. Patricia Holloway had previously been taking letrozole, this was discontinued due to osteoporosis and she is currently taking tamoxifen. Patricia Holloway returns today in scheduled follow-up for evaluation, side effect monitoring, lab monitoring and consideration for 6 months dosing of Prolia. She presents today indicating that overall she is doing fairly well other than chronic fatigue and occasional hot flashes which are tolerable. She has an intentional 9 pound weight loss and contributes the loss to eating healthier. She denies any new breast complaints. Bilateral breast examination today revealed no dominant masses, no skin or nipple changes and no axillary adenopathy. No suspicious abnormality to suggest recurrent breast cancer. Bilateral diagnostic mammogram on 11/11/2020 documented no mammographic evidence of malignancy. CBC was reviewed today, is within acceptable limits and is documented below.     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 Heraclio Charlton was seen in initial oncology consultation on 11/20/2018 for a recent diagnosis of invasive low grade ductal carcinoma of the left breast. She is status post left lumpectomy, left sentinel node biopsy, IORT and initiated on Femara by Dr. Bettie Martinez. Rufino Basurto was referred from Dr. Lorenza Chacko to establish care. Rufino Basurto presented with no specific complaints and indicated tolerating the Femara without difficulty. The following are pertinent events related to her diagnosis of breast cancer:  7/18/2018- bilateral mammogram documented an area of asymmetrical density deep in the upper lateral right breast is stable. An area of parenchymal distortion and spiculation deep in the lateral left breast, which is lateral and deep or to the area of prior surgery. BI-RADS Category 0   7/24/2018- unilateral coned mammogram of the left breast documented a spiculated 1.1 x 0.8 cm density with regional distortion. Persistent within the left lateral breast near the 2 to 3 o'clock position in the posterior depth. BI-RADS Category 4.   8/1/2018- Ultrasound-guided biopsy of the left breast revealed a 1.1 by 1.0 x 0.74 cm grade invasive mammary carcinoma. ER 99%, NJ 21%, HER-2/nancy negative by IHC and Ki-67 is 7% and low. Mamma print low risk. 8/20/2018- MRI of the breast documented postoperative changes in the left breast consistent with the biopsy-proven invasive breast cancer. No additional sites of suspicious enhancement identified in bilateral breast and no evidence of lymphadenopathy. 8/29/2018- initiated letrozole 2.5 mg daily   9/28/2018- Dr. Lorenza Chacko completed left lumpectomy with IORT. Pathology documented invasive low grade ductal carcinoma with associated intermediate grade ductal carcinoma in situ, pT1b,pNX.  Tumor measured release tablet Take 1 tablet by mouth daily (with breakfast) 90 tablet 1    Liraglutide (VICTOZA) 18 MG/3ML SOPN SC injection Inject 1.2 mg into the skin daily 18 mL 5    atorvastatin (LIPITOR) 40 MG tablet TAKE 1 TABLET BY MOUTH DAILY 90 tablet 3    nystatin (MYCOSTATIN) 265182 UNIT/GM powder Apply 3 times daily. 1 Bottle 2    irbesartan (AVAPRO) 75 MG tablet TAKE ONE-HALF TABLET BY MOUTH DAILY 120 tablet 1    Insulin Pen Needle 32G X 4 MM MISC 1 each by Does not apply route daily 100 each 3    MICROLET LANCETS MISC USE TO TEST ONCE DAILY 100 each 3    Cyanocobalamin (VITAMIN B 12 PO) Take by mouth      BD PEN NEEDLE PRIYANKA U/F 32G X 4 MM MISC USE AS DIRECTED WITH VICTOZA 100 each 5    Nutritional Supplements (DHEA PO) Take by mouth      Calcium Citrate-Vitamin D (CALCIUM CITRATE + D PO) Take by mouth      blood glucose monitor kit and supplies 1 kit by Other route daily Test 1 times a day & as needed for symptoms of irregular blood glucose. 1 kit 0    blood glucose monitor strips Test blood sugars daily. 682 strip 3    folic acid (FOLVITE) 1 MG tablet Take 1 mg by mouth daily      Ascorbic Acid (VITAMIN C) 500 MG tablet Take 500 mg by mouth daily      metFORMIN (GLUCOPHAGE-XR) 750 MG extended release tablet TAKE 1/2 TABLET BY MOUTH DAILY WITH BREAKFAST (Patient not taking: Reported on 6/3/2021) 90 tablet 3     No current facility-administered medications for this visit.         Allergies: No Known Allergies    Social History:    Social History     Tobacco Use    Smoking status: Never Smoker    Smokeless tobacco: Never Used   Substance Use Topics    Alcohol use: No    Drug use: No       Family History:   Family History   Problem Relation Age of Onset    Diabetes Father     Heart Disease Father     High Blood Pressure Father     Cancer Father         Pancreatic cancer-  at 80    Coronary Art Dis Father     Coronary Art Dis Mother        Vitals:  Vitals:    21 1028   BP: 124/62 Pulse: 75   SpO2: 98%   Weight: 135 lb 1.6 oz (61.3 kg)   Height: 5' 3\" (1.6 m)        Subjective   REVIEW OF SYSTEMS:   Review of Systems   Constitutional: Negative. Negative for chills, diaphoresis and fever. HENT: Negative. Negative for congestion, ear pain, hearing loss, nosebleeds, sore throat and tinnitus. Eyes: Negative. Negative for pain, discharge and redness. Respiratory: Negative. Negative for cough, shortness of breath and wheezing. Cardiovascular: Negative. Negative for chest pain, palpitations and leg swelling. Gastrointestinal: Negative. Negative for abdominal pain, blood in stool, constipation, diarrhea, nausea and vomiting. Endocrine: Positive for heat intolerance (Hot flashes occasional/tolerable). Negative for polydipsia. Genitourinary: Negative for dysuria, flank pain, frequency, hematuria and urgency. Musculoskeletal: Negative. Negative for back pain, myalgias and neck pain. Skin: Negative. Negative for rash. Neurological: Negative. Negative for dizziness, tremors, seizures, weakness and headaches. Hematological: Does not bruise/bleed easily. Psychiatric/Behavioral: Negative. The patient is not nervous/anxious. Objective   PHYSICAL EXAM:  Physical Exam  Vitals reviewed. Constitutional:       General: She is not in acute distress. Appearance: She is well-developed. She is not diaphoretic. HENT:      Head: Normocephalic and atraumatic. Mouth/Throat:      Pharynx: Uvula midline. Tonsils: No tonsillar exudate. Eyes:      General: Lids are normal. No scleral icterus. Right eye: No discharge. Left eye: No discharge. Conjunctiva/sclera: Conjunctivae normal.      Pupils: Pupils are equal, round, and reactive to light. Neck:      Thyroid: No thyroid mass or thyromegaly. Vascular: No JVD. Trachea: Trachea normal. No tracheal deviation. Cardiovascular:      Rate and Rhythm: Normal rate and regular rhythm. PM

## 2021-07-20 ENCOUNTER — OFFICE VISIT (OUTPATIENT)
Dept: PRIMARY CARE CLINIC | Age: 61
End: 2021-07-20
Payer: COMMERCIAL

## 2021-07-20 VITALS
SYSTOLIC BLOOD PRESSURE: 124 MMHG | WEIGHT: 140 LBS | TEMPERATURE: 97 F | HEART RATE: 73 BPM | OXYGEN SATURATION: 98 % | DIASTOLIC BLOOD PRESSURE: 72 MMHG | HEIGHT: 63 IN | BODY MASS INDEX: 24.8 KG/M2

## 2021-07-20 DIAGNOSIS — L23.7 POISON IVY DERMATITIS: Primary | ICD-10-CM

## 2021-07-20 PROCEDURE — 99213 OFFICE O/P EST LOW 20 MIN: CPT | Performed by: NURSE PRACTITIONER

## 2021-07-20 PROCEDURE — 96372 THER/PROPH/DIAG INJ SC/IM: CPT | Performed by: NURSE PRACTITIONER

## 2021-07-20 RX ORDER — METHYLPREDNISOLONE ACETATE 80 MG/ML
80 INJECTION, SUSPENSION INTRA-ARTICULAR; INTRALESIONAL; INTRAMUSCULAR; SOFT TISSUE ONCE
Status: COMPLETED | OUTPATIENT
Start: 2021-07-20 | End: 2021-07-20

## 2021-07-20 RX ADMIN — METHYLPREDNISOLONE ACETATE 80 MG: 80 INJECTION, SUSPENSION INTRA-ARTICULAR; INTRALESIONAL; INTRAMUSCULAR; SOFT TISSUE at 15:39

## 2021-07-20 NOTE — PROGRESS NOTES
After obtaining consent, and per orders of Maya Bradley,APRN   injection of Depo Medrol was given in theLVG by Kirstin Gunn LPN  Pt tolerated well and had no reactions.

## 2021-07-20 NOTE — PROGRESS NOTES
ChiefComplaint:   Chief Complaint   Patient presents with    Blister     bilateral legs       History of Present Illness:  Syed Adair is a 59 y.o. female who presents for evaluation of blisters on bilateral lower legs. Patient reports \"I pulled some poison ivy and realized after pulling it what it was. \" She has been using cortisone cream to help relieve the itch, but reports the spots are not going away. Patient reports she was exposed a week ago Monday, and noticed her spots that evening.      History:  Past Medical History:   Diagnosis Date    Abdominal hernia     Cancer ([facility] [address]     breast cancer    Carotid artery plaque      <50% rt;  50-69% left    Essential hypertension     H/O abnormal cervical Papanicolaou smear     Mixed hyperlipidemia     Osteopenia     Simple goiter     [facility]     Umbilical hernia        Family History   Problem Relation Age of Onset    Diabetes Father     Heart Disease Father     High Blood Pressure Father     Cancer Father         Pancreatic cancer-  at 80    Coronary Art Dis Father     Coronary Art Dis Mother      Social History     Socioeconomic History    Marital status:      Spouse name: Not on file    Number of children: Not on file    Years of education: Not on file    Highest education level: Not on file   Occupational History    Not on file   Tobacco Use    Smoking status: Never Smoker    Smokeless tobacco: Never Used   Substance and Sexual Activity    Alcohol use: No    Drug use: No    Sexual activity: Not on file   Other Topics Concern    Not on file   Social History Narrative    Not on file     Social Determinants of Health     Financial Resource Strain: Low Risk     Difficulty of Paying Living Expenses: Not hard at all   Food Insecurity: No Food Insecurity    Worried About Running Out of Food in the Last Year: Never true    Pari of Food in the Last Year: Never true   Transportation symptoms of irregular blood glucose. 1 kit 0    blood glucose monitor strips Test blood sugars daily. 087 strip 3    folic acid (FOLVITE) 1 MG tablet Take 1 mg by mouth daily      Ascorbic Acid (VITAMIN C) 500 MG tablet Take 500 mg by mouth daily       No current facility-administered medications on file prior to visit. Review of Systems:  Review of Systems   Constitutional: Negative for activity change and fever. HENT: Negative for congestion, ear pain and sore throat. Respiratory: Negative for cough, chest tightness and shortness of breath. Cardiovascular: Negative for chest pain. Gastrointestinal: Negative for abdominal pain, diarrhea, nausea and vomiting. Genitourinary: Negative for frequency and urgency. Musculoskeletal: Negative for arthralgias and myalgias. Skin: Positive for color change and rash. Neurological: Negative for dizziness, weakness and numbness. Psychiatric/Behavioral: Negative for agitation. The patient is not nervous/anxious. Vital Signs:  /72   Pulse 73   Temp 97 °F (36.1 °C) (Temporal)   Ht 5' 3\" (1.6 m)   Wt 140 lb (63.5 kg)   SpO2 98%   BMI 24.80 kg/m²     Physical Exam:  Physical Exam  Vitals reviewed. Constitutional:       Appearance: Normal appearance. HENT:      Head: Normocephalic. Right Ear: Tympanic membrane normal.      Left Ear: Tympanic membrane normal.      Nose: Nose normal.      Mouth/Throat:      Mouth: Mucous membranes are moist.      Pharynx: Oropharynx is clear. Eyes:      Extraocular Movements: Extraocular movements intact. Pupils: Pupils are equal, round, and reactive to light. Cardiovascular:      Rate and Rhythm: Normal rate and regular rhythm. Pulses: Normal pulses. Heart sounds: Normal heart sounds. Pulmonary:      Effort: Pulmonary effort is normal.      Breath sounds: Normal breath sounds. Abdominal:      General: Bowel sounds are normal.      Palpations: Abdomen is soft. Musculoskeletal:         General: Normal range of motion. Cervical back: Normal range of motion. Skin:     General: Skin is warm and dry. Findings: Rash present. Rash is vesicular. Neurological:      Mental Status: She is alert and oriented to person, place, and time. Psychiatric:         Mood and Affect: Mood normal.         Behavior: Behavior normal.          Assessment & Plan    1. Poison ivy dermatitis  Patient will continue to use hydrocortisone cream or benadryl gel as needed for itching.     - methylPREDNISolone acetate (DEPO-MEDROL) injection 80 mg       Return in about 3 weeks (around 8/10/2021), or if symptoms worsen or fail to improve.

## 2021-07-20 NOTE — PATIENT INSTRUCTIONS
1. Depo medrol injection today  2. Use benadryl gel- cold or hydrocortisone cream as needed  3.  Follow up for any further concerns

## 2021-07-21 PROBLEM — L23.7 CONTACT DERMATITIS DUE TO POISON IVY: Status: ACTIVE | Noted: 2021-07-21

## 2021-07-21 ASSESSMENT — ENCOUNTER SYMPTOMS
VOMITING: 0
COLOR CHANGE: 1
ABDOMINAL PAIN: 0
COUGH: 0
SHORTNESS OF BREATH: 0
CHEST TIGHTNESS: 0
SORE THROAT: 0
NAUSEA: 0
DIARRHEA: 0

## 2021-07-29 ENCOUNTER — TELEPHONE (OUTPATIENT)
Dept: PRIMARY CARE CLINIC | Age: 61
End: 2021-07-29

## 2021-07-29 RX ORDER — METHYLPREDNISOLONE 4 MG/1
TABLET ORAL
Qty: 1 KIT | Refills: 0 | Status: SHIPPED | OUTPATIENT
Start: 2021-07-29 | End: 2021-08-04

## 2021-07-29 NOTE — TELEPHONE ENCOUNTER
Okay for medrol dose pack; triamcinolone topically twice daily as needed. May take benadryl for itching. Is she certain she had contact with poison ivy? I would just want to be sure it was not shingles given she is having pain with it and I did not evaluate in person.

## 2021-08-10 ENCOUNTER — OFFICE VISIT (OUTPATIENT)
Dept: PRIMARY CARE CLINIC | Age: 61
End: 2021-08-10
Payer: COMMERCIAL

## 2021-08-10 VITALS
HEIGHT: 63 IN | DIASTOLIC BLOOD PRESSURE: 62 MMHG | OXYGEN SATURATION: 96 % | BODY MASS INDEX: 23.62 KG/M2 | WEIGHT: 133.3 LBS | HEART RATE: 60 BPM | RESPIRATION RATE: 18 BRPM | TEMPERATURE: 97.5 F | SYSTOLIC BLOOD PRESSURE: 126 MMHG

## 2021-08-10 DIAGNOSIS — E11.9 TYPE 2 DIABETES MELLITUS WITHOUT COMPLICATION, WITHOUT LONG-TERM CURRENT USE OF INSULIN (HCC): Primary | ICD-10-CM

## 2021-08-10 PROCEDURE — 3051F HG A1C>EQUAL 7.0%<8.0%: CPT | Performed by: NURSE PRACTITIONER

## 2021-08-10 PROCEDURE — 99213 OFFICE O/P EST LOW 20 MIN: CPT | Performed by: NURSE PRACTITIONER

## 2021-08-10 ASSESSMENT — ENCOUNTER SYMPTOMS
NAUSEA: 0
COUGH: 0
SHORTNESS OF BREATH: 0
VOMITING: 0
PHOTOPHOBIA: 0
COLOR CHANGE: 0
VOICE CHANGE: 0
RHINORRHEA: 0
BACK PAIN: 0

## 2021-08-10 NOTE — PROGRESS NOTES
[facility] PRIMARY CARE  [address] [address] [address] [address]  Dept: [phone]  Dept Fax: [phone]  Loc: [phone]    William Daley is a 59 y.o. female who presents today for her medical conditions/complaints as noted below. William Daley is c/o of Hyperlipidemia (Pt reports she is stable therapy.)        HPI:     HPI   Chief Complaint   Patient presents with    Hyperlipidemia     Pt reports she is stable therapy. Patient presents today for DM follow-up. A1C has increased since last visit; she reports she has only been taking half of her metformin and diet has not been as strict the last month. Blood pressure remains stable. She denies complaints today.      Lab Results   Component Value Date    LABA1C 7.7 (H) 08/10/2021     No results found for: EAG      Past Medical History:   Diagnosis Date    Abdominal hernia     Cancer ([facility] [address]     breast cancer    Carotid artery plaque     9/16 <50% rt;  50-69% left    Essential hypertension     H/O abnormal cervical Papanicolaou smear     Mixed hyperlipidemia     Osteopenia     Simple goiter     Type 2 diabetes mellitus without complication ([facility] [address]     Umbilical hernia       Past Surgical History:   Procedure Laterality Date    CHOLECYSTECTOMY  1997    COLONOSCOPY      COLPOSCOPY      6/07 Dr Rashawn Carmona, no dysplasia    TX BX/REMV,LYMPH NODE,DEEP AXILL Left 11/6/2018    BREAST BIOPSY SENTINEL NODE performed by Fernando Ramirez MD at [facility] 19, PARTIAL Left 9/28/2018    BREAST LUMPECTOMY AND INTRAOPERATIVE ULTRASOUND GUIDED NEEDLE LOCALIZATION AND IORT performed by Fernando Ramirez MD at [address] [facility] 8/10/2021 7/20/2021 6/3/2021 5/13/2021 2/9/2021 12/9/5519   SYSTOLIC 543 812 832 914 241 819   DIASTOLIC 62 72 62 70 76 70   Site - - - Right Upper Arm - -   Position - - - - - -   Cuff Size - - - - - -   Pulse 60 73 75 73 79 90   Temp 97.5 97 - 98.1 97.7 96.9   Resp 18 - - - 16 -   SpO2 96 98 98 monitor strips Test blood sugars daily. 084 strip 3    folic acid (FOLVITE) 1 MG tablet Take 1 mg by mouth daily      Ascorbic Acid (VITAMIN C) 500 MG tablet Take 500 mg by mouth daily       No current facility-administered medications on file prior to visit. No Known Allergies    Health Maintenance   Topic Date Due    Hepatitis C screen  Never done    HIV screen  Never done    Diabetic retinal exam  06/28/2018    Shingles Vaccine (3 of 3) 04/16/2019    COVID-19 Vaccine (1) 07/20/2022 (Originally 1/24/1972)    Flu vaccine (1) 09/01/2021    Breast cancer screen  11/11/2021    Diabetic microalbuminuria test  02/01/2022    Lipid screen  02/01/2022    Potassium monitoring  02/01/2022    Creatinine monitoring  02/01/2022    Diabetic foot exam  02/09/2022    A1C test (Diabetic or Prediabetic)  05/13/2022    Colon cancer screen colonoscopy  07/16/2023    Pneumococcal 0-64 years Vaccine (3 of 4 - PPSV23) 08/07/2023    Cervical cancer screen  01/08/2025    DTaP/Tdap/Td vaccine (2 - Td or Tdap) 08/07/2028    Hepatitis A vaccine  Aged Out    Hib vaccine  Aged Out    Meningococcal (ACWY) vaccine  Aged Out       Subjective:      Review of Systems   Constitutional: Negative for chills and fever. HENT: Negative for ear pain, hearing loss, rhinorrhea and voice change. Eyes: Negative for photophobia and visual disturbance. Respiratory: Negative for cough and shortness of breath. Cardiovascular: Negative for chest pain and palpitations. Gastrointestinal: Negative for nausea and vomiting. Endocrine: Negative. Negative for cold intolerance and heat intolerance. Genitourinary: Negative for difficulty urinating and flank pain. Musculoskeletal: Negative for back pain and neck pain. Skin: Negative for color change and rash. Allergic/Immunologic: Negative for environmental allergies and food allergies. Neurological: Negative for dizziness, speech difficulty and headaches.    Hematological: Does not bruise/bleed easily. Psychiatric/Behavioral: Negative for sleep disturbance and suicidal ideas. Objective:     Physical Exam  Vitals and nursing note reviewed. Constitutional:       Appearance: She is well-developed. HENT:      Head: Atraumatic. Right Ear: External ear normal.      Left Ear: External ear normal.      Nose: Nose normal.   Eyes:      Conjunctiva/sclera: Conjunctivae normal.      Pupils: Pupils are equal, round, and reactive to light. Cardiovascular:      Rate and Rhythm: Normal rate and regular rhythm. Heart sounds: Normal heart sounds, S1 normal and S2 normal.   Pulmonary:      Effort: Pulmonary effort is normal.      Breath sounds: Normal breath sounds. Abdominal:      General: Bowel sounds are normal.      Palpations: Abdomen is soft. Musculoskeletal:         General: Normal range of motion. Cervical back: Normal range of motion and neck supple. Skin:     General: Skin is warm and dry. Neurological:      Mental Status: She is alert and oriented to person, place, and time. Psychiatric:         Behavior: Behavior normal.       /62   Pulse 60   Temp 97.5 °F (36.4 °C) (Temporal)   Resp 18   Ht 5' 3\" (1.6 m)   Wt 133 lb 4.8 oz (60.5 kg)   SpO2 96%   BMI 23.61 kg/m²     Assessment:       Diagnosis Orders   1. Type 2 diabetes mellitus without complication, without long-term current use of insulin (HCC)           Plan:     Advised to take whole tablet of Metformin; follow ADA dietary guidelines, increase exercise. Will see her back in 3 months to recheck A1C. Patient given educational materials -see patient instructions. Discussed use, benefit, and side effects of prescribed medications. All patient questions answered. Pt voiced understanding. Reviewed health maintenance. Instructed to continue currentmedications, diet and exercise. Patient agreed with treatment plan. Follow up as directed.        MEDICATIONS:  No orders of the defined types were placed in this encounter. ORDERS:  No orders of the defined types were placed in this encounter. Follow-up:  No follow-ups on file. PATIENT INSTRUCTIONS:  There are no Patient Instructions on file for this visit. Electronically signed by JAVAN Alvarez on 8/10/2021 at 10:22 AM    EMR Dragon/transcription disclaimer:  Much of thisencounter note is electronic transcription/translation of spoken language to printed texts. The electronic translation of spoken language may be erroneous, or at times, nonsensical words or phrases may be inadvertentlytranscribed.   Although I have reviewed the note for such errors, some may still exist.

## 2021-08-11 ENCOUNTER — TELEPHONE (OUTPATIENT)
Dept: PRIMARY CARE CLINIC | Age: 61
End: 2021-08-11

## 2021-08-11 NOTE — TELEPHONE ENCOUNTER
----- Message from JAVAN Renee sent at 8/10/2021  4:39 PM CDT -----  Please inform patient that A1C has increased to 7.7. I would like for her to resume full tablet (500 mg) metformin.

## 2021-08-12 ENCOUNTER — TELEPHONE (OUTPATIENT)
Dept: PRIMARY CARE CLINIC | Age: 61
End: 2021-08-12

## 2021-08-12 NOTE — TELEPHONE ENCOUNTER
----- Message from JAVAN Garcia sent at 8/10/2021  4:39 PM CDT -----  Please inform patient that A1C has increased to 7.7. I would like for her to resume full tablet (500 mg) metformin.

## 2021-09-30 DIAGNOSIS — E11.9 TYPE 2 DIABETES MELLITUS WITHOUT COMPLICATION, WITHOUT LONG-TERM CURRENT USE OF INSULIN (HCC): ICD-10-CM

## 2021-09-30 RX ORDER — GLUCOSAMINE HCL/CHONDROITIN SU 500-400 MG
CAPSULE ORAL
Qty: 100 STRIP | Refills: 5 | Status: SHIPPED | OUTPATIENT
Start: 2021-09-30

## 2021-09-30 NOTE — TELEPHONE ENCOUNTER
Nadia Ferro called requesting a refill of the below medication which has been pended for you:     Requested Prescriptions     Signed Prescriptions Disp Refills    blood glucose monitor strips 100 strip 5     Sig: Test blood sugars daily.      Authorizing Provider: Kaykay Tomlinson     Ordering User: Kay Boyd       Last Appointment Date: 8/10/2021  Next Appointment Date: 2/14/2022    No Known Allergies

## 2021-10-11 ENCOUNTER — TELEPHONE (OUTPATIENT)
Dept: SURGERY | Age: 61
End: 2021-10-11

## 2021-10-11 DIAGNOSIS — Z85.3 PERSONAL HISTORY OF BREAST CANCER: Primary | ICD-10-CM

## 2021-10-11 NOTE — TELEPHONE ENCOUNTER
Needs an order placed for a mammogram  [address] [phone]  [address] [address] Surgery Practice Staff

## 2021-10-11 NOTE — TELEPHONE ENCOUNTER
Patient has been scheduled for Mammogram and follow up with Dr. Aubree Hoffmann.  Appointments were confirmed with patient

## 2021-11-12 NOTE — PROGRESS NOTES
HISTORY OF PRESENT ILLNESS:    Ms. Nancy Garcia  is status post ultrasound guided breast biopsy  on the left which revealed a 1.1  cm low grade  invasive mammary carcinoma. ER is positive at 99%. AR is positive at 21%. Her-2 is negative by IHC. Ki67 is 7% and low    MRI 8/20/2018     Impression   1. Postbiopsy changes in the left breast are consistent with the   biopsy-proven invasive breast cancer. 2. No additional sites of suspicious enhancement are identified in   bilateral breasts. 3. No MRI evidence of lymphadenopathy       She is now status post left lumpectomy and IORT on 9/28/2018  There was a failure of the NeoProbe and we were unable to get a sentinel node. After discussion with her and with Dr. Janeth Shaffer we attempted rebiopsy of the sentinel node     PATHOLOGY REVEALS:  FINAL DIAGNOSIS:       A. 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 (pT1b, pNX). Tumor measures 0.9cm in greatest dimension. Tumor is 0.2cm from closest inked margin (anterior). Margins of excision are negative.       B. Left breast, additional margin cranial, excision:       Benign breast parenchyma.       Negative for malignancy.       C. Left breast, additional caudal deep margin, excision:       Benign breast parenchyma.       Negative for malignancy. She underwent sentinel node biopsy on 17/5/6005 without complications. She now comes in for follow-up. FINAL DIAGNOSIS:    Lymph nodes, left axilla, sentinel lymph node biopsy: Five lymph nodes  negative for metastatic carcinoma.     She has no new complaints today. She denies weight loss or any other systemic symptoms. She has recently been diagnosed with osteoporosis and is concerned about continuing to take the Femara. She is due to get her first dose of Prolia next week with an additional dose in 6 months.   I discussed her with Dr. Janeth Shaffer and switched her from Femara to tamoxifen. The prescription has been sent to her pharmacy. Mammogram-11/12/2021     EXAMINATION:  MILIND DIGITAL DIAGNOSTIC W OR WO CAD BILATERAL  11/15/2021   3:39 PM   HISTORY: Personal history of breast cancer. Status post left   lumpectomy and intraoperative radiation therapy. COMPARISON: Previous mammograms dated 11/11/2020, 10/28/2019,   7/18/2018, 7/19/2017, 7/6/2016, 6/29/2015   TECHNIQUE:    2-D and 3-D multi projection mammograms were obtained as well as spot   compression magnification true lateral imaging of the lumpectomy site. FINDINGS:    There are no dominant masses or suspicious calcifications. There are   no developing asymmetries. Postsurgical changes noted in the upper outer quadrant of the left   breast.   No additional areas of architectural distortion observed. No developing malignant features observed. Yearly mammographic follow-up is recommended or earlier if clinically   indicated.       Impression   1. Postsurgical changes left breast. No malignant features. 2. ACR BI-RADS Category 2, benign findings. Her daughter has lost half her body weight following gastric bypass surgery and is extremely pleased. PHYSICAL EXAM:  The left lumpectomy incision is looking good. There are fibrocystic changes and appropriate post operative changes. There are no dominant masses, no skin or nipple changes, and no axillary adenopathy. There is nothing suspicious for new carcinoma and no evidence of local tumor recurrence. .    IMPRESSION:    Doing well s/p left lumpectomy with IORT on 9/28/2018     PLAN: Discontinue Letrozole due to Osteoporosis and start taking Tamoxifen 20 mg daily. She will return in 6 months for a physical exam and bilateral mammograms. I have seen, examined and reviewed this patient medication list, appropriate labs and imaging studies. I reviewed relevant medical records and others physicians notes.  I discussed the plans of care with the patient. I answered all the questions to the patients satisfaction. I, Dr Jocelynn Hinojosa, personally performed the services described in this documentation as scribed by Florentin Mcneal MA in my presence and is both accurate and complete. (Please note that portions of this note were completed with a voice recognition program. Efforts were made to edit the dictations but occasionally words are mis-transcribed.)  Over 50% of the total visit time of 20 minutes in face to face encounter with the patient, out of which more than 50% of the time was spent in counseling patient or family and coordination of care. Counseling included but was not limited to time spent reviewing labs, imaging studies/ treatment plan and answering questions.

## 2021-11-15 ENCOUNTER — OFFICE VISIT (OUTPATIENT)
Dept: SURGERY | Age: 61
End: 2021-11-15
Payer: COMMERCIAL

## 2021-11-15 ENCOUNTER — HOSPITAL ENCOUNTER (OUTPATIENT)
Dept: WOMENS IMAGING | Age: 61
Discharge: HOME | End: 2021-11-15
Payer: COMMERCIAL

## 2021-11-15 VITALS
OXYGEN SATURATION: 98 % | HEART RATE: 72 BPM | WEIGHT: 140.2 LBS | HEIGHT: 63 IN | TEMPERATURE: 97 F | BODY MASS INDEX: 24.84 KG/M2

## 2021-11-15 DIAGNOSIS — Z85.3 PERSONAL HISTORY OF BREAST CANCER: ICD-10-CM

## 2021-11-15 DIAGNOSIS — C50.812 MALIGNANT NEOPLASM OF OVERLAPPING SITES OF LEFT BREAST IN FEMALE, ESTROGEN RECEPTOR POSITIVE (HCC): Primary | ICD-10-CM

## 2021-11-15 DIAGNOSIS — Z17.0 MALIGNANT NEOPLASM OF OVERLAPPING SITES OF LEFT BREAST IN FEMALE, ESTROGEN RECEPTOR POSITIVE (HCC): Primary | ICD-10-CM

## 2021-11-15 DIAGNOSIS — Z98.890 STATUS POST LEFT BREAST LUMPECTOMY: ICD-10-CM

## 2021-11-15 PROCEDURE — 99213 OFFICE O/P EST LOW 20 MIN: CPT | Performed by: SURGERY

## 2021-11-15 PROCEDURE — G0279 TOMOSYNTHESIS, MAMMO: HCPCS

## 2021-11-19 ENCOUNTER — TELEPHONE (OUTPATIENT)
Dept: PRIMARY CARE CLINIC | Age: 61
End: 2021-11-19

## 2021-11-19 DIAGNOSIS — E78.2 MIXED HYPERLIPIDEMIA: ICD-10-CM

## 2021-11-19 DIAGNOSIS — I10 ESSENTIAL HYPERTENSION: ICD-10-CM

## 2021-11-19 RX ORDER — IRBESARTAN 75 MG/1
TABLET ORAL
Qty: 45 TABLET | Refills: 0 | Status: SHIPPED | OUTPATIENT
Start: 2021-11-19 | End: 2022-02-14 | Stop reason: SDUPTHER

## 2021-11-19 RX ORDER — METFORMIN HYDROCHLORIDE 500 MG/1
500 TABLET, EXTENDED RELEASE ORAL
Qty: 90 TABLET | Refills: 1 | Status: SHIPPED | OUTPATIENT
Start: 2021-11-19 | End: 2022-02-14 | Stop reason: SDUPTHER

## 2021-11-19 RX ORDER — ATORVASTATIN CALCIUM 40 MG/1
TABLET, FILM COATED ORAL
Qty: 7 TABLET | Refills: 0 | Status: SHIPPED | OUTPATIENT
Start: 2021-11-19 | End: 2021-11-24 | Stop reason: SDUPTHER

## 2021-11-19 NOTE — TELEPHONE ENCOUNTER
Returned patients voice mail regarding changing pharmacy to [address]   Reviewed Rx and two scripts sent to pharmacy

## 2021-11-23 DIAGNOSIS — Z17.0 MALIGNANT NEOPLASM OF OVERLAPPING SITES OF LEFT BREAST IN FEMALE, ESTROGEN RECEPTOR POSITIVE (HCC): Primary | ICD-10-CM

## 2021-11-23 DIAGNOSIS — C50.812 MALIGNANT NEOPLASM OF OVERLAPPING SITES OF LEFT BREAST IN FEMALE, ESTROGEN RECEPTOR POSITIVE (HCC): Primary | ICD-10-CM

## 2021-11-23 RX ORDER — TAMOXIFEN CITRATE 20 MG/1
20 TABLET ORAL DAILY
Qty: 30 TABLET | Refills: 0 | Status: SHIPPED | OUTPATIENT
Start: 2021-11-23 | End: 2021-11-24 | Stop reason: SDUPTHER

## 2021-11-23 RX ORDER — MONTELUKAST SODIUM 10 MG/1
10 TABLET ORAL DAILY
Qty: 90 TABLET | Refills: 0 | Status: SHIPPED | OUTPATIENT
Start: 2021-11-23 | End: 2021-11-24 | Stop reason: SDUPTHER

## 2021-11-23 NOTE — TELEPHONE ENCOUNTER
Dandre Mendez called requesting a refill of the below medication which has been pended for you:     Requested Prescriptions     Pending Prescriptions Disp Refills    montelukast (SINGULAIR) 10 MG tablet 90 tablet 0     Sig: Take 1 tablet by mouth daily    tamoxifen (NOLVADEX) 20 MG tablet 30 tablet 0     Sig: Take 1 tablet by mouth daily       Last Appointment Date: 8/10/2021  Next Appointment Date: 2/14/2022    No Known Allergies

## 2021-11-24 DIAGNOSIS — E78.2 MIXED HYPERLIPIDEMIA: ICD-10-CM

## 2021-11-24 RX ORDER — ATORVASTATIN CALCIUM 40 MG/1
TABLET, FILM COATED ORAL
Qty: 10 TABLET | Refills: 0 | Status: SHIPPED | OUTPATIENT
Start: 2021-11-24 | End: 2021-11-24 | Stop reason: SDUPTHER

## 2021-11-24 RX ORDER — ATORVASTATIN CALCIUM 40 MG/1
TABLET, FILM COATED ORAL
Qty: 90 TABLET | Refills: 0 | Status: SHIPPED | OUTPATIENT
Start: 2021-11-24 | End: 2022-01-26

## 2021-11-24 RX ORDER — TAMOXIFEN CITRATE 20 MG/1
20 TABLET ORAL DAILY
Qty: 90 TABLET | Refills: 0 | Status: SHIPPED | OUTPATIENT
Start: 2021-11-24 | End: 2022-01-26

## 2021-11-24 RX ORDER — MONTELUKAST SODIUM 10 MG/1
10 TABLET ORAL DAILY
Qty: 90 TABLET | Refills: 0 | Status: SHIPPED | OUTPATIENT
Start: 2021-11-24 | End: 2022-01-26

## 2021-11-24 NOTE — TELEPHONE ENCOUNTER
Willow Bacon called requesting a refill of the below medication which has been pended for you:     Requested Prescriptions     Pending Prescriptions Disp Refills    atorvastatin (LIPITOR) 40 MG tablet 10 tablet 0     Sig: TAKE 1 TABLET BY MOUTH DAILY       Last Appointment Date: 8/10/2021  Next Appointment Date: 2/14/2022    No Known Allergies

## 2021-12-16 DIAGNOSIS — Z17.0 MALIGNANT NEOPLASM OF OVERLAPPING SITES OF LEFT BREAST IN FEMALE, ESTROGEN RECEPTOR POSITIVE (HCC): Primary | ICD-10-CM

## 2021-12-16 DIAGNOSIS — C50.912 INVASIVE DUCTAL CARCINOMA OF BREAST, LEFT (HCC): ICD-10-CM

## 2021-12-16 DIAGNOSIS — C50.812 MALIGNANT NEOPLASM OF OVERLAPPING SITES OF LEFT BREAST IN FEMALE, ESTROGEN RECEPTOR POSITIVE (HCC): Primary | ICD-10-CM

## 2021-12-20 ENCOUNTER — HOSPITAL ENCOUNTER (OUTPATIENT)
Dept: INFUSION THERAPY | Age: 61
Discharge: HOME | End: 2021-12-20
Payer: COMMERCIAL

## 2021-12-20 VITALS
WEIGHT: 141 LBS | OXYGEN SATURATION: 98 % | HEART RATE: 103 BPM | BODY MASS INDEX: 24.98 KG/M2 | SYSTOLIC BLOOD PRESSURE: 138 MMHG | DIASTOLIC BLOOD PRESSURE: 60 MMHG

## 2021-12-20 DIAGNOSIS — C50.812 MALIGNANT NEOPLASM OF OVERLAPPING SITES OF LEFT BREAST IN FEMALE, ESTROGEN RECEPTOR POSITIVE (HCC): Primary | ICD-10-CM

## 2021-12-20 DIAGNOSIS — C50.912 INVASIVE DUCTAL CARCINOMA OF BREAST, LEFT (HCC): ICD-10-CM

## 2021-12-20 DIAGNOSIS — Z17.0 MALIGNANT NEOPLASM OF OVERLAPPING SITES OF LEFT BREAST IN FEMALE, ESTROGEN RECEPTOR POSITIVE (HCC): Primary | ICD-10-CM

## 2021-12-20 DIAGNOSIS — M81.0 OSTEOPOROSIS, UNSPECIFIED OSTEOPOROSIS TYPE, UNSPECIFIED PATHOLOGICAL FRACTURE PRESENCE: ICD-10-CM

## 2021-12-20 LAB
ALBUMIN SERPL-MCNC: 3.9 G/DL (ref 3.5–5.2)
ALP BLD-CCNC: 59 U/L (ref 35–104)
ALT SERPL-CCNC: 25 U/L (ref 9–52)
ANION GAP SERPL CALCULATED.3IONS-SCNC: 8 MMOL/L (ref 7–19)
AST SERPL-CCNC: 28 U/L (ref 14–36)
BASOPHILS ABSOLUTE: 0.02 K/UL (ref 0.01–0.08)
BASOPHILS RELATIVE PERCENT: 0.3 % (ref 0.1–1.2)
BILIRUB SERPL-MCNC: 0.8 MG/DL (ref 0.2–1.3)
BUN BLDV-MCNC: 9 MG/DL (ref 7–17)
CALCIUM SERPL-MCNC: 9.5 MG/DL (ref 8.4–10.2)
CHLORIDE BLD-SCNC: 106 MMOL/L (ref 98–111)
CO2: 34 MMOL/L (ref 22–29)
CREAT SERPL-MCNC: 0.5 MG/DL (ref 0.5–1)
EOSINOPHILS ABSOLUTE: 0.13 K/UL (ref 0.04–0.54)
EOSINOPHILS RELATIVE PERCENT: 1.9 % (ref 0.7–7)
GFR NON-AFRICAN AMERICAN: >60
GLOBULIN: 2.5 G/DL
GLUCOSE BLD-MCNC: 189 MG/DL (ref 74–106)
HCT VFR BLD CALC: 37.9 % (ref 34.1–44.9)
HEMOGLOBIN: 13 G/DL (ref 11.2–15.7)
LYMPHOCYTES ABSOLUTE: 2.18 K/UL (ref 1.18–3.74)
LYMPHOCYTES RELATIVE PERCENT: 32.7 % (ref 19.3–53.1)
MCH RBC QN AUTO: 33 PG (ref 25.6–32.2)
MCHC RBC AUTO-ENTMCNC: 34.3 G/DL (ref 32.3–35.5)
MCV RBC AUTO: 96.2 FL (ref 79.4–94.8)
MONOCYTES ABSOLUTE: 0.4 K/UL (ref 0.24–0.82)
MONOCYTES RELATIVE PERCENT: 6 % (ref 4.7–12.5)
NEUTROPHILS ABSOLUTE: 3.94 K/UL (ref 1.56–6.13)
NEUTROPHILS RELATIVE PERCENT: 59.1 % (ref 34–71.1)
PDW BLD-RTO: 11.6 % (ref 11.7–14.4)
PLATELET # BLD: 233 K/UL (ref 182–369)
PMV BLD AUTO: 9.5 FL (ref 7.4–10.4)
POTASSIUM SERPL-SCNC: 4.4 MMOL/L (ref 3.5–5.1)
RBC # BLD: 3.94 M/UL (ref 3.93–5.22)
SODIUM BLD-SCNC: 148 MMOL/L (ref 137–145)
TOTAL PROTEIN: 6.4 G/DL (ref 6.3–8.2)
WBC # BLD: 6.67 K/UL (ref 3.98–10.04)

## 2021-12-20 PROCEDURE — 85025 COMPLETE CBC W/AUTO DIFF WBC: CPT

## 2021-12-20 PROCEDURE — 6360000002 HC RX W HCPCS: Performed by: INTERNAL MEDICINE

## 2021-12-20 PROCEDURE — 80053 COMPREHEN METABOLIC PANEL: CPT

## 2021-12-20 PROCEDURE — 36415 COLL VENOUS BLD VENIPUNCTURE: CPT

## 2021-12-20 PROCEDURE — 96372 THER/PROPH/DIAG INJ SC/IM: CPT

## 2021-12-20 RX ADMIN — DENOSUMAB 60 MG: 60 INJECTION SUBCUTANEOUS at 10:34

## 2022-01-26 DIAGNOSIS — E78.2 MIXED HYPERLIPIDEMIA: ICD-10-CM

## 2022-01-26 RX ORDER — MONTELUKAST SODIUM 10 MG/1
TABLET ORAL
Qty: 90 TABLET | Refills: 3 | Status: SHIPPED | OUTPATIENT
Start: 2022-01-26 | End: 2022-05-16 | Stop reason: SDUPTHER

## 2022-01-26 RX ORDER — ATORVASTATIN CALCIUM 40 MG/1
TABLET, FILM COATED ORAL
Qty: 10 TABLET | Refills: 3 | Status: SHIPPED | OUTPATIENT
Start: 2022-01-26 | End: 2022-02-14 | Stop reason: SDUPTHER

## 2022-01-26 RX ORDER — TAMOXIFEN CITRATE 20 MG/1
TABLET ORAL
Qty: 30 TABLET | Refills: 3 | Status: SHIPPED | OUTPATIENT
Start: 2022-01-26 | End: 2022-05-16 | Stop reason: SDUPTHER

## 2022-02-14 ENCOUNTER — OFFICE VISIT (OUTPATIENT)
Dept: PRIMARY CARE CLINIC | Age: 62
End: 2022-02-14
Payer: COMMERCIAL

## 2022-02-14 VITALS
OXYGEN SATURATION: 98 % | SYSTOLIC BLOOD PRESSURE: 118 MMHG | WEIGHT: 142 LBS | TEMPERATURE: 97.1 F | HEIGHT: 63 IN | BODY MASS INDEX: 25.16 KG/M2 | DIASTOLIC BLOOD PRESSURE: 68 MMHG | HEART RATE: 70 BPM

## 2022-02-14 DIAGNOSIS — E78.2 MIXED HYPERLIPIDEMIA: ICD-10-CM

## 2022-02-14 DIAGNOSIS — E11.9 TYPE 2 DIABETES MELLITUS WITHOUT COMPLICATION, WITHOUT LONG-TERM CURRENT USE OF INSULIN (HCC): ICD-10-CM

## 2022-02-14 DIAGNOSIS — I10 ESSENTIAL HYPERTENSION: ICD-10-CM

## 2022-02-14 LAB — HBA1C MFR BLD: 7.7 %

## 2022-02-14 PROCEDURE — 83036 HEMOGLOBIN GLYCOSYLATED A1C: CPT | Performed by: NURSE PRACTITIONER

## 2022-02-14 PROCEDURE — 3051F HG A1C>EQUAL 7.0%<8.0%: CPT | Performed by: NURSE PRACTITIONER

## 2022-02-14 PROCEDURE — 99214 OFFICE O/P EST MOD 30 MIN: CPT | Performed by: NURSE PRACTITIONER

## 2022-02-14 RX ORDER — METFORMIN HYDROCHLORIDE 500 MG/1
500 TABLET, EXTENDED RELEASE ORAL
Qty: 90 TABLET | Refills: 2 | Status: SHIPPED | OUTPATIENT
Start: 2022-02-14 | End: 2022-05-16 | Stop reason: SDUPTHER

## 2022-02-14 RX ORDER — IRBESARTAN 75 MG/1
TABLET ORAL
Qty: 45 TABLET | Refills: 3 | Status: SHIPPED | OUTPATIENT
Start: 2022-02-14 | End: 2022-05-16 | Stop reason: SDUPTHER

## 2022-02-14 RX ORDER — LIRAGLUTIDE 6 MG/ML
1.2 INJECTION SUBCUTANEOUS DAILY
Qty: 18 ML | Refills: 3 | Status: SHIPPED | OUTPATIENT
Start: 2022-02-14 | End: 2022-05-16 | Stop reason: SDUPTHER

## 2022-02-14 RX ORDER — ATORVASTATIN CALCIUM 40 MG/1
TABLET, FILM COATED ORAL
Qty: 90 TABLET | Refills: 3 | Status: SHIPPED | OUTPATIENT
Start: 2022-02-14 | End: 2022-05-16 | Stop reason: SDUPTHER

## 2022-02-14 ASSESSMENT — PATIENT HEALTH QUESTIONNAIRE - PHQ9
SUM OF ALL RESPONSES TO PHQ9 QUESTIONS 1 & 2: 0
1. LITTLE INTEREST OR PLEASURE IN DOING THINGS: 0
SUM OF ALL RESPONSES TO PHQ QUESTIONS 1-9: 0
SUM OF ALL RESPONSES TO PHQ QUESTIONS 1-9: 0
2. FEELING DOWN, DEPRESSED OR HOPELESS: 0
SUM OF ALL RESPONSES TO PHQ QUESTIONS 1-9: 0
SUM OF ALL RESPONSES TO PHQ QUESTIONS 1-9: 0

## 2022-02-14 ASSESSMENT — ENCOUNTER SYMPTOMS
NAUSEA: 0
RHINORRHEA: 0
COUGH: 0
VOMITING: 0
BACK PAIN: 0
VOICE CHANGE: 0
SHORTNESS OF BREATH: 0
COLOR CHANGE: 0
PHOTOPHOBIA: 0

## 2022-02-14 NOTE — PROGRESS NOTES
[facility] CARE  [address] [id number] [address] [address]  Dept: [phone]  Dept Fax: [phone]  Loc: [phone]    Bartolo Saavedra is a 57 y.o. female who presents today for her medical conditions/complaints as noted below. Bartolo Saavedra is c/o of Diabetes (doing good)        HPI:     HPI   Chief Complaint   Patient presents with    Diabetes     doing good     Patient presents today for follow-up diabetes. Her A1C is unchanged at 7.7. she denies complaints today. Blood pressure is in normal range. She declines covid19 vaccine. She is due to see Dr Van Jolley in May and to have mammogram; history breast cancer.      Past Medical History:   Diagnosis Date    Abdominal hernia     Breast cancer ([address]     Cancer [facility] [address]     breast cancer    Carotid artery plaque     9/16 <50% rt;  50-69% left    Essential hypertension     H/O abnormal cervical Papanicolaou smear     Mixed hyperlipidemia     Osteopenia     Simple goiter     Type 2 diabetes mellitus without complication ([facility] [address]     Umbilical hernia       Past Surgical History:   Procedure Laterality Date    BREAST LUMPECTOMY      CHOLECYSTECTOMY  1997    COLONOSCOPY      COLPOSCOPY      6/07 Dr Alok Randle, no dysplasia    [place] BX/REMV,LYMPH NODE,DEEP AXILL Left 11/6/2018    BREAST BIOPSY SENTINEL NODE performed by Aron Beltran MD at [facility] 19, PARTIAL Left 9/28/2018    BREAST LUMPECTOMY AND INTRAOPERATIVE ULTRASOUND GUIDED NEEDLE LOCALIZATION AND IORT performed by Aron Beltran MD at [address] [facility] 2/14/2022 12/20/2021 11/15/2021 8/10/2021 7/20/2021 2/2/5467   SYSTOLIC 907 401 - [phone]   DIASTOLIC 68 60 - 62 72 62   Site - - - - - -   Position - - - - - -   Cuff Size - - - - - -   Pulse 70 103 72 60 73 75   Temp 97.1 - 97 97.5 97 -   Resp - - - 18 - -   SpO2 98 98 98 96 98 98   Weight 142 lb 141 lb 140 lb 3.2 oz 133 lb 4.8 oz 140 lb 135 lb 1.6 oz   Height 5' 3\" - 5' 3\" 5' 3\" 5' 3\" 5' 3\"   Body mass index 25.15 kg/m2 - 24.83 kg/m2 23.61 kg/m2 24.8 kg/m2 23.93 kg/m2   Some recent data might be hidden       Family History   Problem Relation Age of Onset    Diabetes Father     Heart Disease Father     High Blood Pressure Father     Cancer Father         Pancreatic cancer-  at 80    Coronary Art Dis Father     Coronary Art Dis Mother        Social History     Tobacco Use    Smoking status: Never Smoker    Smokeless tobacco: Never Used   Substance Use Topics    Alcohol use: No      Current Outpatient Medications on File Prior to Visit   Medication Sig Dispense Refill    montelukast (SINGULAIR) 10 MG tablet TAKE ONE (1) TABLET BY MOUTH DAILY 90 tablet 3    tamoxifen (NOLVADEX) 20 MG tablet TAKE ONE (1) TABLET BY MOUTH DAILY 30 tablet 3    blood glucose monitor strips Test blood sugars daily. 100 strip 5    nystatin (MYCOSTATIN) 455546 UNIT/GM powder Apply 3 times daily. 1 Bottle 2    Insulin Pen Needle 32G X 4 MM MISC 1 each by Does not apply route daily 100 each 3    MICROLET LANCETS MISC USE TO TEST ONCE DAILY 100 each 3    Cyanocobalamin (VITAMIN B 12 PO) Take by mouth      BD PEN NEEDLE PRIYANKA U/F 32G X 4 MM MISC USE AS DIRECTED WITH VICTOZA 100 each 5    Nutritional Supplements (DHEA PO) Take by mouth      Calcium Citrate-Vitamin D (CALCIUM CITRATE + D PO) Take by mouth      blood glucose monitor kit and supplies 1 kit by Other route daily Test 1 times a day & as needed for symptoms of irregular blood glucose. 1 kit 0    Ascorbic Acid (VITAMIN C) 500 MG tablet Take 500 mg by mouth daily      folic acid (FOLVITE) 1 MG tablet Take 1 mg by mouth daily (Patient not taking: Reported on 2022)       No current facility-administered medications on file prior to visit.      No Known Allergies    Health Maintenance   Topic Date Due    Hepatitis C screen  Never done    Depression Screen  Never done    HIV screen  Never done    Diabetic retinal exam  2018    Diabetic microalbuminuria test  02/01/2022    Diabetic foot exam  02/09/2022    COVID-19 Vaccine (1) 07/20/2022 (Originally 1/24/1965)    Lipid screen  08/10/2022    Breast cancer screen  11/15/2022    Potassium monitoring  12/20/2022    Creatinine monitoring  12/20/2022    A1C test (Diabetic or Prediabetic)  02/14/2023    Colon cancer screen colonoscopy  07/16/2023    Pneumococcal 0-64 years Vaccine (2 of 2 - PPSV23) 08/07/2023    Cervical cancer screen  01/08/2025    DTaP/Tdap/Td vaccine (2 - Td or Tdap) 08/07/2028    Flu vaccine  Completed    Shingles Vaccine  Completed    Hepatitis A vaccine  Aged Out    Hib vaccine  Aged Out    Meningococcal (ACWY) vaccine  Aged Out       Subjective:      Review of Systems   Constitutional: Negative for chills and fever. HENT: Negative for ear pain, hearing loss, rhinorrhea and voice change. Eyes: Negative for photophobia and visual disturbance. Respiratory: Negative for cough and shortness of breath. Cardiovascular: Negative for chest pain and palpitations. Gastrointestinal: Negative for nausea and vomiting. Endocrine: Negative. Negative for cold intolerance and heat intolerance. Genitourinary: Negative for difficulty urinating and flank pain. Musculoskeletal: Negative for back pain and neck pain. Skin: Negative for color change and rash. Allergic/Immunologic: Negative for environmental allergies and food allergies. Neurological: Negative for dizziness, speech difficulty and headaches. Hematological: Does not bruise/bleed easily. Psychiatric/Behavioral: Negative for sleep disturbance and suicidal ideas. Objective:     Physical Exam  Vitals and nursing note reviewed. Constitutional:       Appearance: She is well-developed. HENT:      Head: Atraumatic.       Right Ear: External ear normal.      Left Ear: External ear normal.      Nose: Nose normal.   Eyes:      Conjunctiva/sclera: Conjunctivae normal.      Pupils: Pupils are equal, round, and reactive to light. Cardiovascular:      Rate and Rhythm: Normal rate and regular rhythm. Heart sounds: Normal heart sounds, S1 normal and S2 normal.   Pulmonary:      Effort: Pulmonary effort is normal.      Breath sounds: Normal breath sounds. Abdominal:      General: Bowel sounds are normal.      Palpations: Abdomen is soft. Musculoskeletal:         General: Normal range of motion. Cervical back: Normal range of motion and neck supple. Skin:     General: Skin is warm and dry. Neurological:      Mental Status: She is alert and oriented to person, place, and time. Psychiatric:         Behavior: Behavior normal.       /68   Pulse 70   Temp 97.1 °F (36.2 °C) (Temporal)   Ht 5' 3\" (1.6 m)   Wt 142 lb (64.4 kg)   SpO2 98%   BMI 25.15 kg/m²     Assessment:       Diagnosis Orders   1. Essential hypertension  irbesartan (AVAPRO) 75 MG tablet   2. Type 2 diabetes mellitus without complication, without long-term current use of insulin (HCC)  Liraglutide (VICTOZA) 18 MG/3ML SOPN SC injection    POCT glycosylated hemoglobin (Hb A1C)   3. Mixed hyperlipidemia  atorvastatin (LIPITOR) 40 MG tablet         Plan:   More than 50% of the time was spent counseling and coordinating care for a total time of 30 min face to face. Follow-up in 3-4 months with fasting labs prior. Contact Dr Anastasia Blood office to schedule May appointment and mammogram.     PDMP Monitoring:    Last PDMP Santosh Mckeon as Reviewed [facility]):  Review User Review Instant Review Result            Urine Drug Screenings (1 yr)    No resulted procedures found. Medication Contract and Consent for Opioid Use Documents Filed      No documents found                 Patient given educational materials -see patient instructions. Discussed use, benefit, and side effects of prescribed medications. All patient questions answered. Pt voiced understanding. Reviewed health maintenance.   Instructed to continue currentmedications, diet and exercise. Patient agreed with treatment plan. Follow up as directed. MEDICATIONS:  Orders Placed This Encounter   Medications    irbesartan (AVAPRO) 75 MG tablet     Sig: TAKE ONE-HALF TABLET BY MOUTH DAILY. GENERIC EQUIVALENT FOR AVAPRO     Dispense:  45 tablet     Refill:  3    Liraglutide (VICTOZA) 18 MG/3ML SOPN SC injection     Sig: Inject 1.2 mg into the skin daily     Dispense:  18 mL     Refill:  3    atorvastatin (LIPITOR) 40 MG tablet     Sig: TAKE ONE (1) TABLET BY MOUTH DAILY     Dispense:  90 tablet     Refill:  3    metFORMIN (GLUCOPHAGE XR) 500 MG extended release tablet     Sig: Take 1 tablet by mouth daily (with breakfast)     Dispense:  90 tablet     Refill:  2         ORDERS:  Orders Placed This Encounter   Procedures    POCT glycosylated hemoglobin (Hb A1C)       Follow-up:  No follow-ups on file. PATIENT INSTRUCTIONS:  There are no Patient Instructions on file for this visit. Electronically signed by JAVAN Murcia on 2/14/2022 at 10:21 AM    EMR Dragon/transcription disclaimer:  Much of thisencounter note is electronic transcription/translation of spoken language to printed texts. The electronic translation of spoken language may be erroneous, or at times, nonsensical words or phrases may be inadvertentlytranscribed.   Although I have reviewed the note for such errors, some may still exist.

## 2022-03-18 ENCOUNTER — TELEPHONE (OUTPATIENT)
Dept: PRIMARY CARE CLINIC | Age: 62
End: 2022-03-18

## 2022-03-18 NOTE — TELEPHONE ENCOUNTER
Patient called said she needed medication called in for a cough and coughing up mucus. I told her that I could schedule her a appointment but she didn't want to come in on Monday.

## 2022-05-09 NOTE — PROGRESS NOTES
HISTORY OF PRESENT ILLNESS:    Ms. Usama Carroll presents today for a 6-month breast exam. This is following left lumpectomy and IORT on 9/28/2018    An ultrasound guided breast biopsy  on the left which revealed a 1.1  cm low grade  invasive mammary carcinoma. ER is positive at 99%. MT is positive at 21%. Her-2 is negative by IHC. Ki67 is 7% and low    MammaPrint demonstrated a low risk luminalA phenotype    MRI 8/20/2018     Impression   1. Postbiopsy changes in the left breast are consistent with the   biopsy-proven invasive breast cancer. 2. No additional sites of suspicious enhancement are identified in   bilateral breasts. 3. No MRI evidence of lymphadenopathy       PATHOLOGY REVEALS:  FINAL DIAGNOSIS:       A. 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 (pT1b, pNX). Tumor measures 0.9cm in greatest dimension. Tumor is 0.2cm from closest inked margin (anterior). Margins of excision are negative. B. Left breast, additional margin cranial, excision:       Benign breast parenchyma.       Negative for malignancy. C. Left breast, additional caudal deep margin, excision:       Benign breast parenchyma.       Negative for malignancy. She underwent sentinel node biopsy on 52/4/4701 without complications. She now comes in for follow-up. FINAL DIAGNOSIS:    Lymph nodes, left axilla, sentinel lymph node biopsy: Five lymph nodes  negative for metastatic carcinoma.     She has no new complaints today. She denies weight loss or any other systemic symptoms. She has recently been diagnosed with osteoporosis and is concerned about continuing to take the Femara. She is due to get her first dose of Prolia next week with an additional dose in 6 months. I discussed her with Dr. Harris Peguero and switched her from Femara to tamoxifen. The prescription has been sent to her pharmacy.     Mammogram11/12/2021 EXAMINATION:  [facility] DIGITAL DIAGNOSTIC W OR WO CAD BILATERAL  11/15/2021   3:39 PM   HISTORY: Personal history of breast cancer. Status post left   lumpectomy and intraoperative radiation therapy. COMPARISON: Previous mammograms dated 11/11/2020, 10/28/2019,   7/18/2018, 7/19/2017, 7/6/2016, 6/29/2015   TECHNIQUE:    2-D and 3-D multi projection mammograms were obtained as well as spot   compression magnification true lateral imaging of the lumpectomy site. FINDINGS:    There are no dominant masses or suspicious calcifications. There are   no developing asymmetries. Postsurgical changes noted in the upper outer quadrant of the left   breast.   No additional areas of architectural distortion observed. No developing malignant features observed. Yearly mammographic follow-up is recommended or earlier if clinically   indicated.       Impression   1. Postsurgical changes left breast. No malignant features. 2. ACR BI-RADS Category 2, benign findings. Her daughter has lost half her body weight following gastric bypass surgery and is extremely pleased. PHYSICAL EXAM:  The left lumpectomy incision is looking good. There are fibrocystic changes and appropriate post operative changes. There are no dominant masses, no skin or nipple changes, and no axillary adenopathy. There is nothing suspicious for new carcinoma and no evidence of local tumor recurrence. She is currently taking Tamoxifen 20 mg daily. IMPRESSION:    Doing well s/p left lumpectomy with IORT on 9/28/2018     PLAN: She will return in 6 months for a physical exam and bilateral mammograms. I have seen, examined and reviewed this patient medication list, appropriate labs and imaging studies. I reviewed relevant medical records and others physicians notes. I discussed the plans of care with the patient. I answered all the questions to the patients satisfaction.   I, Dr Leonila Carrington, personally performed the services described in this documentation as scribed by Manda Llamas MA in my presence and is both accurate and complete. (Please note that portions of this note were completed with a voice recognition program. Efforts were made to edit the dictations but occasionally words are mis-transcribed.)  Over 50% of the total visit time of 20 minutes in face to face encounter with the patient, out of which more than 50% of the time was spent in counseling patient or family and coordination of care. Counseling included but was not limited to time spent reviewing labs, imaging studies/ treatment plan and answering questions.

## 2022-05-11 ENCOUNTER — OFFICE VISIT (OUTPATIENT)
Dept: SURGERY | Age: 62
End: 2022-05-11
Payer: COMMERCIAL

## 2022-05-11 VITALS — HEART RATE: 76 BPM | DIASTOLIC BLOOD PRESSURE: 72 MMHG | SYSTOLIC BLOOD PRESSURE: 120 MMHG

## 2022-05-11 DIAGNOSIS — C50.812 MALIGNANT NEOPLASM OF OVERLAPPING SITES OF LEFT BREAST IN FEMALE, ESTROGEN RECEPTOR POSITIVE (HCC): ICD-10-CM

## 2022-05-11 DIAGNOSIS — Z98.890 STATUS POST LEFT BREAST LUMPECTOMY: ICD-10-CM

## 2022-05-11 DIAGNOSIS — Z17.0 MALIGNANT NEOPLASM OF OVERLAPPING SITES OF LEFT BREAST IN FEMALE, ESTROGEN RECEPTOR POSITIVE (HCC): ICD-10-CM

## 2022-05-11 DIAGNOSIS — Z85.3 PERSONAL HISTORY OF BREAST CANCER: Primary | ICD-10-CM

## 2022-05-11 PROCEDURE — 99213 OFFICE O/P EST LOW 20 MIN: CPT | Performed by: SURGERY

## 2022-05-16 ENCOUNTER — OFFICE VISIT (OUTPATIENT)
Dept: PRIMARY CARE CLINIC | Age: 62
End: 2022-05-16
Payer: COMMERCIAL

## 2022-05-16 VITALS
WEIGHT: 137 LBS | HEART RATE: 72 BPM | BODY MASS INDEX: 24.27 KG/M2 | DIASTOLIC BLOOD PRESSURE: 68 MMHG | OXYGEN SATURATION: 98 % | TEMPERATURE: 97.9 F | SYSTOLIC BLOOD PRESSURE: 118 MMHG | HEIGHT: 63 IN

## 2022-05-16 DIAGNOSIS — I10 ESSENTIAL HYPERTENSION: ICD-10-CM

## 2022-05-16 DIAGNOSIS — E11.9 TYPE 2 DIABETES MELLITUS WITHOUT COMPLICATION, WITHOUT LONG-TERM CURRENT USE OF INSULIN (HCC): Primary | ICD-10-CM

## 2022-05-16 DIAGNOSIS — E11.9 TYPE 2 DIABETES MELLITUS WITHOUT COMPLICATION, WITHOUT LONG-TERM CURRENT USE OF INSULIN (HCC): ICD-10-CM

## 2022-05-16 DIAGNOSIS — E78.2 MIXED HYPERLIPIDEMIA: ICD-10-CM

## 2022-05-16 LAB
ALBUMIN SERPL-MCNC: 4.2 G/DL (ref 3.5–5.2)
ALP BLD-CCNC: 52 U/L (ref 35–104)
ALT SERPL-CCNC: 28 U/L (ref 5–33)
ANION GAP SERPL CALCULATED.3IONS-SCNC: 11 MMOL/L (ref 7–19)
AST SERPL-CCNC: 22 U/L (ref 5–32)
BILIRUB SERPL-MCNC: 0.5 MG/DL (ref 0.2–1.2)
BUN BLDV-MCNC: 8 MG/DL (ref 8–23)
CALCIUM SERPL-MCNC: 9.9 MG/DL (ref 8.8–10.2)
CHLORIDE BLD-SCNC: 106 MMOL/L (ref 98–111)
CHOLESTEROL, TOTAL: 141 MG/DL (ref 160–199)
CO2: 27 MMOL/L (ref 22–29)
CREAT SERPL-MCNC: 0.5 MG/DL (ref 0.5–0.9)
CREATININE URINE: 47.3 MG/DL (ref 4.2–622)
GFR AFRICAN AMERICAN: >59
GFR NON-AFRICAN AMERICAN: >60
GLUCOSE BLD-MCNC: 145 MG/DL (ref 74–109)
HBA1C MFR BLD: 8.1 % (ref 4–6)
HDLC SERPL-MCNC: 59 MG/DL (ref 65–121)
LDL CHOLESTEROL CALCULATED: 65 MG/DL
MICROALBUMIN UR-MCNC: <1.2 MG/DL (ref 0–19)
MICROALBUMIN/CREAT UR-RTO: NORMAL MG/G
POTASSIUM SERPL-SCNC: 4.4 MMOL/L (ref 3.5–5)
SODIUM BLD-SCNC: 144 MMOL/L (ref 136–145)
TOTAL PROTEIN: 6.5 G/DL (ref 6.6–8.7)
TRIGL SERPL-MCNC: 83 MG/DL (ref 0–149)
TSH SERPL DL<=0.05 MIU/L-ACNC: 1.08 UIU/ML (ref 0.27–4.2)
VITAMIN D 25-HYDROXY: 60 NG/ML

## 2022-05-16 PROCEDURE — 99214 OFFICE O/P EST MOD 30 MIN: CPT | Performed by: NURSE PRACTITIONER

## 2022-05-16 PROCEDURE — 3051F HG A1C>EQUAL 7.0%<8.0%: CPT | Performed by: NURSE PRACTITIONER

## 2022-05-16 RX ORDER — LIRAGLUTIDE 6 MG/ML
1.2 INJECTION SUBCUTANEOUS DAILY
Qty: 18 ML | Refills: 3 | Status: SHIPPED | OUTPATIENT
Start: 2022-05-16

## 2022-05-16 RX ORDER — TAMOXIFEN CITRATE 20 MG/1
TABLET ORAL
Qty: 90 TABLET | Refills: 1 | Status: SHIPPED | OUTPATIENT
Start: 2022-05-16

## 2022-05-16 RX ORDER — IRBESARTAN 75 MG/1
TABLET ORAL
Qty: 45 TABLET | Refills: 3 | Status: SHIPPED | OUTPATIENT
Start: 2022-05-16

## 2022-05-16 RX ORDER — METFORMIN HYDROCHLORIDE 500 MG/1
500 TABLET, EXTENDED RELEASE ORAL
Qty: 90 TABLET | Refills: 2 | Status: SHIPPED | OUTPATIENT
Start: 2022-05-16

## 2022-05-16 RX ORDER — MONTELUKAST SODIUM 10 MG/1
TABLET ORAL
Qty: 90 TABLET | Refills: 3 | Status: SHIPPED | OUTPATIENT
Start: 2022-05-16

## 2022-05-16 RX ORDER — ATORVASTATIN CALCIUM 40 MG/1
TABLET, FILM COATED ORAL
Qty: 90 TABLET | Refills: 3 | Status: SHIPPED | OUTPATIENT
Start: 2022-05-16

## 2022-05-16 SDOH — ECONOMIC STABILITY: FOOD INSECURITY: WITHIN THE PAST 12 MONTHS, THE FOOD YOU BOUGHT JUST DIDN'T LAST AND YOU DIDN'T HAVE MONEY TO GET MORE.: NEVER TRUE

## 2022-05-16 SDOH — ECONOMIC STABILITY: FOOD INSECURITY: WITHIN THE PAST 12 MONTHS, YOU WORRIED THAT YOUR FOOD WOULD RUN OUT BEFORE YOU GOT MONEY TO BUY MORE.: NEVER TRUE

## 2022-05-16 ASSESSMENT — ENCOUNTER SYMPTOMS
RHINORRHEA: 0
PHOTOPHOBIA: 0
VOICE CHANGE: 0
NAUSEA: 0
VOMITING: 0
COUGH: 0
SHORTNESS OF BREATH: 0
BACK PAIN: 0
COLOR CHANGE: 0

## 2022-05-16 ASSESSMENT — SOCIAL DETERMINANTS OF HEALTH (SDOH): HOW HARD IS IT FOR YOU TO PAY FOR THE VERY BASICS LIKE FOOD, HOUSING, MEDICAL CARE, AND HEATING?: NOT HARD AT ALL

## 2022-05-16 NOTE — PROGRESS NOTES
[address] [facility]  [address] [address] [place] [address]  Dept: [phone]  Dept Fax: [phone]  Loc: [phone]    India Fritz is a 57 y.o. female who presents today for her medical conditions/complaints as noted below. India Fritz is c/o of 3 Month Follow-Up and Diabetes (low 77 of AM   High 159)      HPI:     HPI   Chief Complaint   Patient presents with    3 Month Follow-Up    Diabetes     low 77 of AM   High 159     Patient presents today for follow-up diabetes. She reports blood sugars have been in normal range. She had labs just prior to visit today. She needs refills on medications today. She is up to date on mammogram and colonoscopy.      Past Medical History:   Diagnosis Date    Abdominal hernia     [facility]     Cancer ([facility]     breast cancer    Carotid artery plaque     9/16 <50% rt;  50-69% left    Essential hypertension     H/O abnormal cervical Papanicolaou smear     Mixed hyperlipidemia     Osteopenia     Simple goiter     Type 2 diabetes mellitus without complication ([facility]     Umbilical hernia       Past Surgical History:   Procedure Laterality Date    BREAST LUMPECTOMY      CHOLECYSTECTOMY  1997    COLONOSCOPY      COLPOSCOPY      6/07 Dr Ronal Sanders, no dysplasia    AR BX/REMV,LYMPH NODE,DEEP AXILL Left 11/6/2018    BREAST BIOPSY SENTINEL NODE performed by [facility]ers [facility], PARTIAL Left 9/28/2018    BREAST LUMPECTOMY AND INTRAOPERATIVE ULTRASOUND GUIDED NEEDLE LOCALIZATION AND IORT performed by Gisele Akers MD at [facility] 5/16/2022 5/11/2022 2/14/2022 12/20/2021 11/15/2021 6/45/2397   SYSTOLIC 411 251 477 127 - 053   DIASTOLIC 68 72 68 60 - 62   Site Right Upper Arm Right Upper Arm - - - -   Position Sitting Sitting - - - -   Cuff Size - Medium Adult - - - -   Pulse 72 76 70 103 72 60   Temp 97.9 - 97.1 - 97 97.5   Resp - - - - - 18   SpO2 98 - 98 98 98 96   Weight 137 lb - 142 lb 141 lb 140 lb 3.2 oz 133 lb 4.8 oz   Height 5' 3\" - 5' 3\" - 5' 3\" 5' 3\"   Body mass index 24.27 kg/m2 - 25.15 kg/m2 - 24.83 kg/m2 23.61 kg/m2   Some recent data might be hidden       Family History   Problem Relation Age of Onset    Diabetes Father     Heart Disease Father     High Blood Pressure Father     Cancer Father         Pancreatic cancer-  at 80    Coronary Art Dis Father     Coronary Art Dis Mother        Social History     Tobacco Use    Smoking status: Never Smoker    Smokeless tobacco: Never Used   Substance Use Topics    Alcohol use: No      Current Outpatient Medications on File Prior to Visit   Medication Sig Dispense Refill    blood glucose monitor strips Test blood sugars daily. 100 strip 5    Insulin Pen Needle 32G X 4 MM MISC 1 each by Does not apply route daily 100 each 3    MICROLET LANCETS MISC USE TO TEST ONCE DAILY 100 each 3    Cyanocobalamin (VITAMIN B 12 PO) Take by mouth      BD PEN NEEDLE PRIYANKA U/F 32G X 4 MM MISC USE AS DIRECTED WITH VICTOZA 100 each 5    Calcium Citrate-Vitamin D (CALCIUM CITRATE + D PO) Take by mouth      blood glucose monitor kit and supplies 1 kit by Other route daily Test 1 times a day & as needed for symptoms of irregular blood glucose. 1 kit 0    folic acid (FOLVITE) 1 MG tablet Take 1 mg by mouth daily       Ascorbic Acid (VITAMIN C) 500 MG tablet Take 500 mg by mouth daily      nystatin (MYCOSTATIN) 373417 UNIT/GM powder Apply 3 times daily. (Patient not taking: Reported on 2022) 1 Bottle 2    Nutritional Supplements (DHEA PO) Take by mouth (Patient not taking: Reported on 2022)       No current facility-administered medications on file prior to visit.      No Known Allergies    Health Maintenance   Topic Date Due    HIV screen  Never done    Hepatitis C screen  Never done    Diabetic retinal exam  2018    Diabetic microalbuminuria test  2022    Diabetic foot exam  2022    COVID-19 Vaccine (1) [birth year]20[birth year] (Originally 1/24/1965)    Lipids  08/10/2022    Breast cancer screen  11/15/2022    A1C test (Diabetic or Prediabetic)  02/14/2023    Depression Screen  02/14/2023    Colorectal Cancer Screen  07/16/2023    Pneumococcal 0-64 years Vaccine (3 - PPSV23 or PCV20) 08/07/2023    Cervical cancer screen  01/08/2025    DTaP/Tdap/Td vaccine (2 - Td or Tdap) 08/07/2028    Flu vaccine  Completed    Shingles vaccine  Completed    Hepatitis A vaccine  Aged Out    Hib vaccine  Aged Out    Meningococcal (ACWY) vaccine  Aged Out       Subjective:      Review of Systems   Constitutional: Negative for chills and fever. HENT: Negative for ear pain, hearing loss, rhinorrhea and voice change. Eyes: Negative for photophobia and visual disturbance. Respiratory: Negative for cough and shortness of breath. Cardiovascular: Negative for chest pain and palpitations. Gastrointestinal: Negative for nausea and vomiting. Endocrine: Negative. Negative for cold intolerance and heat intolerance. Genitourinary: Negative for difficulty urinating and flank pain. Musculoskeletal: Negative for back pain and neck pain. Skin: Negative for color change and rash. Allergic/Immunologic: Negative for environmental allergies and food allergies. Neurological: Negative for dizziness, speech difficulty and headaches. Hematological: Does not bruise/bleed easily. Psychiatric/Behavioral: Negative for sleep disturbance and suicidal ideas. Objective:     Physical Exam  Vitals and nursing note reviewed. Constitutional:       Appearance: She is well-developed. HENT:      Head: Atraumatic. Right Ear: External ear normal.      Left Ear: External ear normal.      Nose: Nose normal.   Eyes:      Conjunctiva/sclera: Conjunctivae normal.      Pupils: Pupils are equal, round, and reactive to light. Cardiovascular:      Rate and Rhythm: Normal rate and regular rhythm.       Heart sounds: Normal heart sounds, S1 normal and S2 normal.   Pulmonary:      Effort: Pulmonary effort is normal.      Breath sounds: Normal breath sounds. Abdominal:      General: Bowel sounds are normal.      Palpations: Abdomen is soft. Musculoskeletal:         General: Normal range of motion. Cervical back: Normal range of motion and neck supple. Skin:     General: Skin is warm and dry. Neurological:      Mental Status: She is alert and oriented to person, place, and time. Psychiatric:         Behavior: Behavior normal.       /68 (Site: Right Upper Arm, Position: Sitting)   Pulse 72   Temp 97.9 °F (36.6 °C) (Temporal)   Ht 5' 3\" (1.6 m)   Wt 137 lb (62.1 kg)   SpO2 98%   BMI 24.27 kg/m²     Assessment:       Diagnosis Orders   1. Type 2 diabetes mellitus without complication, without long-term current use of insulin (HCC)  CBC with Auto Differential    Comprehensive Metabolic Panel    Hemoglobin A1C    Lipid Panel    TSH    Microalbumin / Creatinine Urine Ratio    Liraglutide (VICTOZA) 18 MG/3ML SOPN SC injection   2. Mixed hyperlipidemia  CBC with Auto Differential    Comprehensive Metabolic Panel    Hemoglobin A1C    Lipid Panel    TSH    Microalbumin / Creatinine Urine Ratio    atorvastatin (LIPITOR) 40 MG tablet   3. Essential hypertension  CBC with Auto Differential    Comprehensive Metabolic Panel    Hemoglobin A1C    Lipid Panel    TSH    Microalbumin / Creatinine Urine Ratio    irbesartan (AVAPRO) 75 MG tablet         Plan:   More than 50% of the time was spent counseling and coordinating care for a total time of 30 min face to face. Follow-up in 6 months with labs or sooner if needed. PDMP Monitoring:    Last PDMP Anatoliy as Reviewed [facility]):  Review User Review Instant Review Result            Urine Drug Screenings (1 yr)    No resulted procedures found.        Medication Contract and Consent for Opioid Use Documents Filed      No documents found                 Patient given educational materials -see patient instructions. Discussed use, benefit, and side effects of prescribed medications. All patient questions answered. Pt voiced understanding. Reviewed health maintenance. Instructed to continue currentmedications, diet and exercise. Patient agreed with treatment plan. Follow up as directed. MEDICATIONS:  Orders Placed This Encounter   Medications    tamoxifen (NOLVADEX) 20 MG tablet     Sig: TAKE ONE (1) TABLET BY MOUTH DAILY     Dispense:  90 tablet     Refill:  1    montelukast (SINGULAIR) 10 MG tablet     Sig: TAKE ONE (1) TABLET BY MOUTH DAILY     Dispense:  90 tablet     Refill:  3    irbesartan (AVAPRO) 75 MG tablet     Sig: TAKE ONE-HALF TABLET BY MOUTH DAILY. GENERIC EQUIVALENT FOR AVAPRO     Dispense:  45 tablet     Refill:  3    atorvastatin (LIPITOR) 40 MG tablet     Sig: TAKE ONE (1) TABLET BY MOUTH DAILY     Dispense:  90 tablet     Refill:  3    Liraglutide (VICTOZA) 18 MG/3ML SOPN SC injection     Sig: Inject 1.2 mg into the skin daily     Dispense:  18 mL     Refill:  3    metFORMIN (GLUCOPHAGE XR) 500 MG extended release tablet     Sig: Take 1 tablet by mouth daily (with breakfast)     Dispense:  90 tablet     Refill:  2         ORDERS:  Orders Placed This Encounter   Procedures    CBC with Auto Differential    Comprehensive Metabolic Panel    Hemoglobin A1C    Lipid Panel    TSH    Microalbumin / Creatinine Urine Ratio       Follow-up:  Return in about 6 months (around 11/16/2022) for follow-up with labs, in office. PATIENT INSTRUCTIONS:  There are no Patient Instructions on file for this visit. Electronically signed by JAVAN Lew on 5/16/2022 at 10:49 AM    EMR Dragon/transcription disclaimer:  Much of thisencounter note is electronic transcription/translation of spoken language to printed texts. The electronic translation of spoken language may be erroneous, or at times, nonsensical words or phrases may be inadvertentlytranscribed.   Although I have reviewed the note for such errors, some may still exist.

## 2022-05-27 ENCOUNTER — TELEPHONE (OUTPATIENT)
Dept: PRIMARY CARE CLINIC | Age: 62
End: 2022-05-27

## 2022-05-27 NOTE — TELEPHONE ENCOUNTER
----- Message from JAVAN Quiñonez sent at 5/26/2022  5:53 PM CDT -----  A1C has increased to 8.1. Please inform patient of results; advise to make dietary changes and monitor blood sugar. Will recheck in 3 months.

## 2022-05-27 NOTE — TELEPHONE ENCOUNTER
----- Message from [id number] [facility], APRN sent at 5/26/2022  5:53 PM CDT -----  A1C has increased to 8.1. Please inform patient of results; advise to make dietary changes and monitor blood sugar. Will recheck in 3 months.

## 2022-06-20 ENCOUNTER — TELEMEDICINE (OUTPATIENT)
Dept: PRIMARY CARE CLINIC | Age: 62
End: 2022-06-20
Payer: COMMERCIAL

## 2022-06-20 DIAGNOSIS — L25.9 CONTACT DERMATITIS, UNSPECIFIED CONTACT DERMATITIS TYPE, UNSPECIFIED TRIGGER: Primary | ICD-10-CM

## 2022-06-20 PROCEDURE — 99213 OFFICE O/P EST LOW 20 MIN: CPT | Performed by: NURSE PRACTITIONER

## 2022-06-20 RX ORDER — TRIAMCINOLONE ACETONIDE 0.25 MG/G
CREAM TOPICAL
Qty: 1 EACH | Refills: 0 | Status: SHIPPED | OUTPATIENT
Start: 2022-06-20

## 2022-06-20 RX ORDER — DEXAMETHASONE SODIUM PHOSPHATE 10 MG/ML
10 INJECTION INTRAMUSCULAR; INTRAVENOUS ONCE
Status: COMPLETED | OUTPATIENT
Start: 2022-06-20 | End: 2022-06-21

## 2022-06-20 ASSESSMENT — ENCOUNTER SYMPTOMS
COLOR CHANGE: 0
VOICE CHANGE: 0
COUGH: 0
BACK PAIN: 0
NAUSEA: 0
SHORTNESS OF BREATH: 0
PHOTOPHOBIA: 0
RHINORRHEA: 0
VOMITING: 0

## 2022-06-20 NOTE — PROGRESS NOTES
[address] [address]             Phone:  [phone]  Fax:  [phone]       2022    TELEHEALTH EVALUATION -- Audio/Visual (During [id number] public health emergency)    HPI:  Chief Complaint   Patient presents with   [facility]     pt states its on her arms and bend of elbow        Raz Jerry (:  1960) has requested an audio/video evaluation for the following concern(s):    Patient presents today for evaluation of poison ivy. She states she has it on her arms and hands. She thinks she got it from the cat. She has been applying calamine lotion with little relief. She denies shortness of breath or difficulty swallowing. Review of Systems   Constitutional: Negative for chills and fever. HENT: Negative for ear pain, hearing loss, rhinorrhea and voice change. Eyes: Negative for photophobia and visual disturbance. Respiratory: Negative for cough and shortness of breath. Cardiovascular: Negative for chest pain and palpitations. Gastrointestinal: Negative for nausea and vomiting. Endocrine: Negative. Negative for cold intolerance and heat intolerance. Genitourinary: Negative for difficulty urinating and flank pain. Musculoskeletal: Negative for back pain and neck pain. Skin: Positive for rash. Negative for color change. Allergic/Immunologic: Negative for environmental allergies and food allergies. Neurological: Negative for dizziness, speech difficulty and headaches. Hematological: Does not bruise/bleed easily. Psychiatric/Behavioral: Negative for sleep disturbance and suicidal ideas. Prior to Visit Medications    Medication Sig Taking?  Authorizing Provider   triamcinolone (KENALOG) 0.025 % cream Apply Topically Yes Wash [place], APRN   tamoxifen (NOLVADEX) 20 MG tablet TAKE ONE (1) TABLET BY MOUTH DAILY Yes Wash Mj, APRN   montelukast (SINGULAIR) 10 MG tablet TAKE ONE (1) TABLET BY MOUTH DAILY Yes Josue Starks JAVAN Royal   irbesartan (AVAPRO) 75 MG tablet TAKE ONE-HALF TABLET BY MOUTH DAILY. GENERIC EQUIVALENT FOR AVAPRO Yes JAVAN Saleem   atorvastatin (LIPITOR) 40 MG tablet TAKE ONE (1) TABLET BY MOUTH DAILY Yes JAVAN Saleem   Liraglutide (VICTOZA) 18 MG/3ML SOPN SC injection Inject 1.2 mg into the skin daily Yes JAVAN Saleem   metFORMIN (GLUCOPHAGE XR) 500 MG extended release tablet Take 1 tablet by mouth daily (with breakfast) Yes JAVAN Saleem   blood glucose monitor strips Test blood sugars daily. Yes JAVAN Saleem   Insulin Pen Needle 32G X 4 MM MISC 1 each by Does not apply route daily Yes JAVAN Saleem   MICROLET LANCETS MISC USE TO TEST ONCE DAILY Yes FLAQUITA Clark   Cyanocobalamin (VITAMIN B 12 PO) Take by mouth Yes Historical Provider, MD   BD PEN NEEDLE PRIYANKA U/F 32G X 4 MM MISC USE AS DIRECTED WITH VICTOZA Yes JAVAN Saleem   Calcium Citrate-Vitamin D (CALCIUM CITRATE + D PO) Take by mouth Yes Historical Provider, MD   blood glucose monitor kit and supplies 1 kit by Other route daily Test 1 times a day & as needed for symptoms of irregular blood glucose. Yes FLAQUITA Clark   folic acid (FOLVITE) 1 MG tablet Take 1 mg by mouth daily  Yes Historical Provider, MD   Ascorbic Acid (VITAMIN C) 500 MG tablet Take 500 mg by mouth daily Yes Historical Provider, MD   nystatin (MYCOSTATIN) 753546 UNIT/GM powder Apply 3 times daily.   Patient not taking: Reported on 5/16/2022  JAVAN Kirkpatrick   Nutritional Supplements (DHEA PO) Take by mouth  Patient not taking: Reported on 5/16/2022  Historical Provider, MD       Social History     Tobacco Use    Smoking status: Never Smoker    Smokeless tobacco: Never Used   Substance Use Topics    Alcohol use: No    Drug use: No        No Known Allergies,   Past Medical History:   Diagnosis Date    Abdominal hernia     [facility]     [facility]     breast cancer    Carotid artery plaque  <50% rt;  50-69% left    Essential hypertension     H/O abnormal cervical Papanicolaou smear     Mixed hyperlipidemia     Osteopenia     Simple goiter     Type 2 diabetes mellitus without complication [facility]     Umbilical hernia    ,   Past Surgical History:   Procedure Laterality Date    BREAST LUMPECTOMY      CHOLECYSTECTOMY      COLONOSCOPY      COLPOSCOPY       Dr Alex Aragon, no dysplasia    DC BX/REMV,LYMPH NODE,DEEP AXILL Left 2018    BREAST BIOPSY SENTINEL NODE performed by Raegan Joya MD at [address] [place] [address], PARTIAL Left 2018    BREAST LUMPECTOMY AND INTRAOPERATIVE ULTRASOUND GUIDED NEEDLE LOCALIZATION AND IORT performed by Raegan Joya MD at [address] [place] [address]   ,   Social History     Tobacco Use    Smoking status: Never Smoker    Smokeless tobacco: Never Used   Substance Use Topics    Alcohol use: No    Drug use: No   ,   Family History   Problem Relation Age of Onset    Diabetes Father     Heart Disease Father     High Blood Pressure Father     Cancer Father         Pancreatic cancer-  at 80    Coronary Art Dis Father     Coronary Art Dis Mother    ,   Immunization History   Administered Date(s) Administered    Influenza Virus Vaccine 2016, 2017, 2020, 2022    Influenza, MDCK Quadv, IM, PF (Flucelvax 2 yrs and older) 2020    Pneumococcal Conjugate 13-valent ([id number]) 2018    Pneumococcal Polysaccharide ([id number]) 2018    Tdap (Boostrix, Adacel) 2018    Zoster Live (Zostavax) 2012    Zoster Recombinant (Shingrix) 2019, 2022   ,   Health Maintenance   Topic Date Due    HIV screen  Never done    Hepatitis C screen  Never done    Diabetic retinal exam  2018    Diabetic foot exam  2022    COVID-19 Vaccine (1) 2024 (Originally 1965)    Breast cancer screen  11/15/2022    Depression Screen  2023    A1C test (Diabetic or Prediabetic)  2023    Diabetic microalbuminuria test  05/16/2023    Lipids  05/16/2023    Colorectal Cancer Screen  07/16/2023    Pneumococcal 0-64 years Vaccine (3 - PPSV23 or PCV20) 08/07/2023    Cervical cancer screen  01/08/2025    DTaP/Tdap/Td vaccine (2 - Td or Tdap) 08/07/2028    Flu vaccine  Completed    Shingles vaccine  Completed    Hepatitis A vaccine  Aged Out    Hib vaccine  Aged Out    Meningococcal (ACWY) vaccine  Aged Out       PHYSICAL EXAMINATION:  [ INSTRUCTIONS:  \"[x]\" Indicates a positive item  \"[]\" Indicates a negative item  -- DELETE ALL ITEMS NOT EXAMINED]  [x] Alert  [x] Oriented to person/place/time    [x] No apparent distress  [] Toxic appearing    [] Face flushed appearing [x] Sclera clear  [] Lips are cyanotic      [x] Breathing appears normal  [] Appears tachypneic      [] Rash on visible skin    [x] Cranial Nerves II-XII grossly intact    [x] Motor grossly intact in visible upper extremities    [x] Motor grossly intact in visible lower extremities    [x] Normal Mood  [] Anxious appearing    [] Depressed appearing  [] Confused appearing      [] Poor short term memory  [] Poor long term memory    [] OTHER:      Due to this being a TeleHealth encounter, evaluation of the following organ systems is limited: Vitals/Constitutional/EENT/Resp/CV/GI//MS/Neuro/Skin/Heme-Lymph-Imm. There were no vitals taken for this visit. No flowsheet data found. ASSESSMENT/PLAN:  1. Contact dermatitis, unspecified contact dermatitis type, unspecified trigger  - May take benadryl for itching; she is going to come in tomorrow morning for steroid injection. - dexamethasone (DECADRON) injection 10 mg  - triamcinolone (KENALOG) 0.025 % cream; Apply Topically  Dispense: 1 each; Refill: 0      Return if symptoms worsen or fail to improve. An  electronic signature was used to authenticate this note.     --JAVAN Rojas on 6/20/2022 at 3:41 PM        Pursuant to the emergency declaration under the [facility] waiver authority and the [employer] Act, this Virtual  Visit was conducted, with patient's consent, to reduce the patient's risk of exposure to COVID-19 and provide continuity of care for an established patient. Services were provided through a video synchronous discussion virtually to substitute for in-person clinic visit.

## 2022-06-20 NOTE — PROGRESS NOTES
Progress Note      Pt Name: Linda Kimbrough  YOB: 1960  MRN: [id number]    Date of evaluation: 6/21/2022    History Obtained From:  patient, electronic medical record    CHIEF COMPLAINT:    Chief Complaint   Patient presents with    Follow-up     Invasive ductal carcinoma of breast, left ([address]    Osteoporosis    Treatment     HISTORY OF PRESENT ILLNESS:    Linda Kimbrough is a 57 y.o.  female with significant PMH invasive low-grade ductal carcinoma and is status post left lumpectomy and IORT on 9/28/2018. She is also being treated for osteoporosis with Prolia 60 mg subcu every 6 months, last dose delivered on 12/20/2021. Theron Hathaway had previously been taking letrozole, this was discontinued due to osteoporosis and she is currently taking tamoxifen 20 mg daily. Theron Hathaway returns today in scheduled follow-up for evaluation, side effect monitoring, lab monitoring and consideration for 6 months dosing of Prolia. Today's clinic visit to include physical assessment, review of systems, any lab or radiographic findings that were available and plan of care are documented below.     ONCOLOGIC HISTORY:     Diagnosis:   Invasive low grade ductal carcinoma with associated intermediate grade ductal carcinoma in situ, 8/1/2018   ER 99%, AL 21%, HER-2/nancy 1+ by IHC   Ki-67 is 7%, low   pT1b, pN0   Low risk, luminal type A by Mammaprint   Tumor 1.1 cm    Treatment summary:  Letrozole 2.5 mg daily initiated in 8/29/2018   Left lumpectomy and IORT on 9/28/2018  Left axilla sentinel lymph node biopsy, total of 5 nodes negative, 11/6/2018  Initiated Prolia 60 mg subcu to be given every 6 months on 6/1/2020 5/27/2020-letrozole discontinued and initiated on tamoxifen    Ms. Margie Irving was seen in initial oncology consultation on 11/20/2018 for a recent diagnosis of invasive low grade ductal carcinoma of the left breast. She is status post left lumpectomy, left sentinel node biopsy, IORT and initiated on Femara by  Apolonia Mitchell. Alanis Gonzalez was referred from Dr. Armond Stevens to establish care. Alanis Gonzalez presented with no specific complaints and indicated tolerating the Femara without difficulty. The following are pertinent events related to her diagnosis of breast cancer:  7/18/2018- bilateral mammogram documented an area of asymmetrical density deep in the upper lateral right breast is stable. An area of parenchymal distortion and spiculation deep in the lateral left breast, which is lateral and deep or to the area of prior surgery. BI-RADS Category 0   7/24/2018- unilateral coned mammogram of the left breast documented a spiculated 1.1 x 0.8 cm density with regional distortion. Persistent within the left lateral breast near the 2 to 3 o'clock position in the posterior depth. BI-RADS Category 4.   8/1/2018- Ultrasound-guided biopsy of the left breast revealed a 1.1 by 1.0 x 0.74 cm grade invasive mammary carcinoma. ER 99%, [place] 21%, HER-2/nancy negative by IHC and Ki-67 is 7% and low. Mamma print low risk. 8/20/2018- MRI of the breast documented postoperative changes in the left breast consistent with the biopsy-proven invasive breast cancer. No additional sites of suspicious enhancement identified in bilateral breast and no evidence of lymphadenopathy. 8/29/2018- initiated letrozole 2.5 mg daily   9/28/2018- Dr. Armond Stevens completed left lumpectomy with IORT. Pathology documented invasive low grade ductal carcinoma with associated intermediate grade ductal carcinoma in situ, pT1b,pNX. Tumor measured 0.9 cm in greatest dimension. Margins of excision were negative. 11/6/2018- [place] lymph node biopsy.  5 lymph nodes negative for metastatic carcinoma  11/20/2018-recommendation to continue letrozole 2.5 mg for a additional 5-year dosing  10/28/2019-bilateral mammogram documented no mammographic evidence of malignancy, BI-RADS Category 2.  5/27/2020-Dr. Armond Stevens discontinued the letrozole and initiated tamoxifen due to osteoporosis  6/1/2020-Prolia 60 mg subcu every 6 months initiated for osteoporosis  Bilateral diagnostic mammogram on 11/11/2020 documented no mammographic evidence of malignancy  11/15/2021 Bilateral mammogram- Postsurgical changes left breast. No malignant features. Age-appropriate health screening:  Bone mineral density study on 5/27/2020 documented osteoporosis with a femoral neck T score of -2.9 and a lumbar spine T score of -2.7. Past Medical History:    Past Medical History:   Diagnosis Date    Abdominal hernia     [facility]     Cancer ([facility]     breast cancer    Carotid artery plaque     9/16 <50% rt;  50-69% left    Essential hypertension     H/O abnormal cervical Papanicolaou smear     Mixed hyperlipidemia     Osteopenia     Simple goiter     Type 2 diabetes mellitus without complication ([facility]     Umbilical hernia        Past Surgical History:    Past Surgical History:   Procedure Laterality Date    BREAST LUMPECTOMY      CHOLECYSTECTOMY  1997    COLONOSCOPY      COLPOSCOPY      6/07 Dr Jose Mayo, no dysplasia    [place] BX/REMV,LYMPH NODE,DEEP AXILL Left 11/6/2018    BREAST BIOPSY SENTINEL NODE performed by Angela Knowles MD at [place] [address], PARTIAL Left 9/28/2018    BREAST LUMPECTOMY AND INTRAOPERATIVE ULTRASOUND GUIDED NEEDLE LOCALIZATION AND IORT performed by Angela Knowles MD at [facility] [place] [facility] OR       Current Medications:    Current Outpatient Medications   Medication Sig Dispense Refill    triamcinolone (KENALOG) 0.025 % cream Apply Topically 1 each 0    tamoxifen (NOLVADEX) 20 MG tablet TAKE ONE (1) TABLET BY MOUTH DAILY 90 tablet 1    montelukast (SINGULAIR) 10 MG tablet TAKE ONE (1) TABLET BY MOUTH DAILY 90 tablet 3    irbesartan (AVAPRO) 75 MG tablet TAKE ONE-HALF TABLET BY MOUTH DAILY.  GENERIC EQUIVALENT FOR AVAPRO 45 tablet 3    atorvastatin (LIPITOR) 40 MG tablet TAKE ONE (1) TABLET BY MOUTH DAILY 90 tablet 3    Liraglutide (VICTOZA) 18 MG/3ML SOPN SC injection Inject 1.2 mg into the skin daily 18 mL 3    metFORMIN (GLUCOPHAGE XR) 500 MG extended release tablet Take 1 tablet by mouth daily (with breakfast) 90 tablet 2    blood glucose monitor strips Test blood sugars daily. 100 strip 5    Insulin Pen Needle 32G X 4 MM MISC 1 each by Does not apply route daily 100 each 3    MICROLET LANCETS MISC USE TO TEST ONCE DAILY 100 each 3    Cyanocobalamin (VITAMIN B 12 PO) Take by mouth      BD PEN NEEDLE PRIYANKA U/F 32G X 4 MM MISC USE AS DIRECTED WITH VICTOZA 100 each 5    Calcium Citrate-Vitamin D (CALCIUM CITRATE + D PO) Take by mouth      blood glucose monitor kit and supplies 1 kit by Other route daily Test 1 times a day & as needed for symptoms of irregular blood glucose. 1 kit 0    folic acid (FOLVITE) 1 MG tablet Take 1 mg by mouth daily       Ascorbic Acid (VITAMIN C) 500 MG tablet Take 500 mg by mouth daily      nystatin (MYCOSTATIN) 922687 UNIT/GM powder Apply 3 times daily. (Patient not taking: Reported on 2022) 1 Bottle 2    Nutritional Supplements (DHEA PO) Take by mouth (Patient not taking: Reported on 2022)       No current facility-administered medications for this visit. Allergies: No Known Allergies    Social History:    Social History     Tobacco Use    Smoking status: Never Smoker    Smokeless tobacco: Never Used   Substance Use Topics    Alcohol use: No    Drug use: No       Family History:   Family History   Problem Relation Age of Onset    Diabetes Father     Heart Disease Father     High Blood Pressure Father     Cancer Father         Pancreatic cancer-  at 80    Coronary Art Dis Father     Coronary Art Dis Mother        Vitals:  Vitals:    22 1021   BP: 138/66   Pulse: 80   SpO2: 97%   Height: 5' 3\" (1.6 m)        Subjective   REVIEW OF SYSTEMS:   Review of Systems   Constitutional: Negative. Negative for chills, diaphoresis and fever. HENT: Negative.   Negative for congestion, ear pain, hearing loss, nosebleeds, sore throat and tinnitus. Eyes: Negative. Negative for pain, discharge and redness. Respiratory: Negative. Negative for cough, shortness of breath and wheezing. Cardiovascular: Negative. Negative for chest pain, palpitations and leg swelling. Gastrointestinal: Negative. Negative for abdominal pain, blood in stool, constipation, diarrhea, nausea and vomiting. Endocrine: Negative for polydipsia. Genitourinary: Negative for dysuria, flank pain, frequency, hematuria and urgency. Musculoskeletal: Negative. Negative for back pain, myalgias and neck pain. Skin: Positive for rash. Neurological: Negative. Negative for dizziness, tremors, seizures, weakness and headaches. Hematological: Does not bruise/bleed easily. Psychiatric/Behavioral: Negative. The patient is not nervous/anxious. Objective   PHYSICAL EXAM:  Physical Exam  Vitals reviewed. Constitutional:       General: She is not in acute distress. Appearance: She is well-developed. She is not diaphoretic. HENT:      Head: Normocephalic and atraumatic. Mouth/Throat:      Pharynx: Uvula midline. Tonsils: No tonsillar exudate. Eyes:      General: Lids are normal. No scleral icterus. Right eye: No discharge. Left eye: No discharge. Conjunctiva/sclera: Conjunctivae normal.      Pupils: Pupils are equal, round, and reactive to light. Neck:      Thyroid: No thyroid mass or thyromegaly. Vascular: No JVD. Trachea: Trachea normal. No tracheal deviation. Cardiovascular:      Rate and Rhythm: Normal rate and regular rhythm. Heart sounds: Normal heart sounds. No murmur heard. No friction rub. No gallop. Pulmonary:      Effort: Pulmonary effort is normal. No respiratory distress. Breath sounds: Normal breath sounds. No wheezing or rales. Chest:      Chest wall: No tenderness. Abdominal:      General: Bowel sounds are normal. There is no distension. Palpations: Abdomen is soft. There is no mass. Tenderness: There is no abdominal tenderness. There is no guarding or rebound. Hernia: No hernia is present. Musculoskeletal:         General: No tenderness or deformity. Cervical back: Normal range of motion and neck supple. Comments: Range of motion within normal limits x4 extremities   Skin:     General: Skin is warm. Coloration: Skin is not pale. Findings: Rash (Erythemic nonraised rash to right hand and left inner arm: Poison IVY) present. Neurological:      Mental Status: She is alert and oriented to person, place, and time. Cranial Nerves: No cranial nerve deficit. Coordination: Coordination normal.   Psychiatric:         Behavior: Behavior normal.         Thought Content: Thought content normal.         Labs:  Lab Results   Component Value Date    WBC 7.18 06/21/2022    HGB 13.0 06/21/2022    HCT 39.8 06/21/2022    MCV 97.1 (H) 06/21/2022     (L) 06/21/2022     Lab Results   Component Value Date    NEUTROABS 4.42 06/21/2022       ASSESSMENT/PLAN:      1. Invasive ductal carcinoma of breast, left ([address] status post lumpectomy/sentinel lymph node biopsy and IORT. Currently taking adjuvant endocrine therapy with tamoxifen 20 mg p.o. daily and tolerating without significant difficulty. Denies any new breast complaints. Declined breast exam, recently completed on 5/11/2022 by Dr. Ricco Orlando. 11/15/2021 Bilateral mammogram- Postsurgical changes left breast. No malignant features. Stanislav De Los Santos was seen in scheduled follow-up by Dr Ricco Orlando on 05/11/2022, I reviewed the progress note from that office visit which documented bilateral breast exam with no new findings or evidence of recurrence.     -Continue tamoxifen, will now need 10-year dosing anticipate completion date to be 8/20/2028  -Continue to follow with Dr. Ricco Orlando as recommended    I discussed the importance of compliance with tamoxifen.  Decreased compliance with adjuvant endocrine therapy therapy has been linked to decreased survival. We discussed the various barriers and side effects of endocrine therapy and ways to improve compliance. She acknowledged understanding. I also discussed risk/benefit to include side effects of tamoxifen: Increased risk of uterine cancer, blood clots, stroke, hot flashes, mood changes, joint pain and others. 2.  Osteoporosis: postmenopausal state and at increased risk for bone loss due to being on aromatase inhibitor therapy  Bone mineral density study on 5/27/2020 documented osteoporosis with a femoral neck T score of -2.9 and a lumbar spine T score of -2.7. Reports compliant with calcium supplement and tolerating Prolia without difficulty. Vitamin D level of 60 on 5/16/2022    -Prolia 60 mg subcu delivered today, 6/21/2022  -Continue calcium 1200 mg with vitamin D 1000 units daily  -Schedule for bone density    3. Essential hypertension, /66, within normal limits. I discussed all of the above findings included in the assessment and plan with the patient and the patient is in agreement to move forward with current recommendations/treatment. I have addressed all of their questions and concerns that were verbalized. FOLLOW UP:  1. Follow-up appointment given for 6 months, sooner if needed  2. Continue to follow with other medical providers as recommended  3. Labs at next visit: CBC: CMP and vitamin D level if not recently evaluated by another provider    EMR Dragon/Transcription disclaimer:   Much of this encounter note is an electronic transcription/translation of spoken language to printed text.  The electronic translation of spoken language may permit erroneous, or at times, nonsensical words or phrases to be inadvertently transcribed; although attempts have made to review the note for such errors, some may still exist.  Please excuse any unrecognized transcription errors and contact us if the air is unintelligible or needs documented correction. Also, portions of this note have been copied forward, however, changed to reflect the most current clinical status of this patient. Electronically signed by JAVAN Crenshaw on 6/27/2022 at 9:05 AM  I, Talbert Nageotte, am pre-charting as a registered nurse for JAVAN Redmond.

## 2022-06-21 ENCOUNTER — OFFICE VISIT (OUTPATIENT)
Dept: HEMATOLOGY | Age: 62
End: 2022-06-21
Payer: COMMERCIAL

## 2022-06-21 ENCOUNTER — NURSE ONLY (OUTPATIENT)
Dept: PRIMARY CARE CLINIC | Age: 62
End: 2022-06-21
Payer: COMMERCIAL

## 2022-06-21 ENCOUNTER — HOSPITAL ENCOUNTER (OUTPATIENT)
Dept: INFUSION THERAPY | Age: 62
Discharge: HOME | End: 2022-06-21
Payer: COMMERCIAL

## 2022-06-21 VITALS
BODY MASS INDEX: 24.27 KG/M2 | SYSTOLIC BLOOD PRESSURE: 138 MMHG | HEIGHT: 63 IN | HEART RATE: 80 BPM | DIASTOLIC BLOOD PRESSURE: 66 MMHG | OXYGEN SATURATION: 97 %

## 2022-06-21 DIAGNOSIS — Z17.0 MALIGNANT NEOPLASM OF OVERLAPPING SITES OF LEFT BREAST IN FEMALE, ESTROGEN RECEPTOR POSITIVE (HCC): Primary | ICD-10-CM

## 2022-06-21 DIAGNOSIS — C50.812 MALIGNANT NEOPLASM OF OVERLAPPING SITES OF LEFT BREAST IN FEMALE, ESTROGEN RECEPTOR POSITIVE (HCC): Primary | ICD-10-CM

## 2022-06-21 DIAGNOSIS — Z78.0 POST-MENOPAUSAL: ICD-10-CM

## 2022-06-21 DIAGNOSIS — Z79.810 ENCOUNTER FOR MONITORING TAMOXIFEN THERAPY: ICD-10-CM

## 2022-06-21 DIAGNOSIS — Z51.81 ENCOUNTER FOR MONITORING TAMOXIFEN THERAPY: ICD-10-CM

## 2022-06-21 DIAGNOSIS — M81.0 OSTEOPOROSIS WITHOUT CURRENT PATHOLOGICAL FRACTURE, UNSPECIFIED OSTEOPOROSIS TYPE: ICD-10-CM

## 2022-06-21 DIAGNOSIS — I10 ESSENTIAL HYPERTENSION: ICD-10-CM

## 2022-06-21 DIAGNOSIS — M81.0 OSTEOPOROSIS, UNSPECIFIED OSTEOPOROSIS TYPE, UNSPECIFIED PATHOLOGICAL FRACTURE PRESENCE: ICD-10-CM

## 2022-06-21 LAB
BASOPHILS ABSOLUTE: 0.04 K/UL (ref 0.01–0.08)
BASOPHILS RELATIVE PERCENT: 0.6 % (ref 0.1–1.2)
EOSINOPHILS ABSOLUTE: 0.18 K/UL (ref 0.04–0.54)
EOSINOPHILS RELATIVE PERCENT: 2.5 % (ref 0.7–7)
HCT VFR BLD CALC: 39.8 % (ref 34.1–44.9)
HEMOGLOBIN: 13 G/DL (ref 11.2–15.7)
LYMPHOCYTES ABSOLUTE: 2.06 K/UL (ref 1.18–3.74)
LYMPHOCYTES RELATIVE PERCENT: 28.7 % (ref 19.3–53.1)
MCH RBC QN AUTO: 31.7 PG (ref 25.6–32.2)
MCHC RBC AUTO-ENTMCNC: 32.7 G/DL (ref 32.3–35.5)
MCV RBC AUTO: 97.1 FL (ref 79.4–94.8)
MONOCYTES ABSOLUTE: 0.47 K/UL (ref 0.24–0.82)
MONOCYTES RELATIVE PERCENT: 6.5 % (ref 4.7–12.5)
NEUTROPHILS ABSOLUTE: 4.42 K/UL (ref 1.56–6.13)
NEUTROPHILS RELATIVE PERCENT: 61.6 % (ref 34–71.1)
PDW BLD-RTO: 12.2 % (ref 11.7–14.4)
PLATELET # BLD: 161 K/UL (ref 182–369)
PMV BLD AUTO: 10.6 FL (ref 7.4–10.4)
RBC # BLD: 4.1 M/UL (ref 3.93–5.22)
WBC # BLD: 7.18 K/UL (ref 3.98–10.04)

## 2022-06-21 PROCEDURE — 85025 COMPLETE CBC W/AUTO DIFF WBC: CPT

## 2022-06-21 PROCEDURE — 99212 OFFICE O/P EST SF 10 MIN: CPT

## 2022-06-21 PROCEDURE — 96372 THER/PROPH/DIAG INJ SC/IM: CPT

## 2022-06-21 PROCEDURE — 99214 OFFICE O/P EST MOD 30 MIN: CPT | Performed by: NURSE PRACTITIONER

## 2022-06-21 PROCEDURE — 6360000002 HC RX W HCPCS: Performed by: NURSE PRACTITIONER

## 2022-06-21 PROCEDURE — 96372 THER/PROPH/DIAG INJ SC/IM: CPT | Performed by: NURSE PRACTITIONER

## 2022-06-21 RX ORDER — SODIUM CHLORIDE 9 MG/ML
INJECTION, SOLUTION INTRAVENOUS CONTINUOUS
OUTPATIENT
Start: 2022-11-29

## 2022-06-21 RX ORDER — DIPHENHYDRAMINE HYDROCHLORIDE 50 MG/ML
50 INJECTION INTRAMUSCULAR; INTRAVENOUS ONCE
OUTPATIENT
Start: 2022-11-29 | End: 2022-11-29

## 2022-06-21 RX ORDER — SODIUM CHLORIDE 9 MG/ML
INJECTION, SOLUTION INTRAVENOUS CONTINUOUS
Status: CANCELLED | OUTPATIENT
Start: 2022-06-21

## 2022-06-21 RX ORDER — METHYLPREDNISOLONE SODIUM SUCCINATE 125 MG/2ML
125 INJECTION, POWDER, LYOPHILIZED, FOR SOLUTION INTRAMUSCULAR; INTRAVENOUS ONCE
Status: CANCELLED | OUTPATIENT
Start: 2022-06-21 | End: 2022-06-21

## 2022-06-21 RX ORDER — FAMOTIDINE 10 MG/ML
20 INJECTION, SOLUTION INTRAVENOUS ONCE
Status: CANCELLED | OUTPATIENT
Start: 2022-06-21 | End: 2022-06-21

## 2022-06-21 RX ORDER — EPINEPHRINE 1 MG/ML
0.3 INJECTION, SOLUTION, CONCENTRATE INTRAVENOUS PRN
OUTPATIENT
Start: 2022-11-29

## 2022-06-21 RX ORDER — EPINEPHRINE 1 MG/ML
0.3 INJECTION, SOLUTION, CONCENTRATE INTRAVENOUS PRN
Status: CANCELLED | OUTPATIENT
Start: 2022-06-21

## 2022-06-21 RX ORDER — DIPHENHYDRAMINE HYDROCHLORIDE 50 MG/ML
50 INJECTION INTRAMUSCULAR; INTRAVENOUS ONCE
Status: CANCELLED | OUTPATIENT
Start: 2022-06-21 | End: 2022-06-21

## 2022-06-21 RX ORDER — METHYLPREDNISOLONE SODIUM SUCCINATE 125 MG/2ML
125 INJECTION, POWDER, LYOPHILIZED, FOR SOLUTION INTRAMUSCULAR; INTRAVENOUS ONCE
OUTPATIENT
Start: 2022-11-29 | End: 2022-11-29

## 2022-06-21 RX ORDER — FAMOTIDINE 10 MG/ML
20 INJECTION, SOLUTION INTRAVENOUS ONCE
OUTPATIENT
Start: 2022-11-29 | End: 2022-11-29

## 2022-06-21 RX ADMIN — DEXAMETHASONE SODIUM PHOSPHATE 10 MG: 10 INJECTION INTRAMUSCULAR; INTRAVENOUS at 11:21

## 2022-06-21 RX ADMIN — DENOSUMAB 60 MG: 60 INJECTION SUBCUTANEOUS at 10:42

## 2022-06-21 NOTE — PROGRESS NOTES
After obtaining consent, and per orders of Jones EDOUARD , injection of Dexamethasone 10mg  given in right hip  by Nena Camilo MA. Patient instructed to remain in clinic for 20 minutes afterwards, and to report any adverse reaction to me immediately.

## 2022-06-27 ASSESSMENT — ENCOUNTER SYMPTOMS
NAUSEA: 0
COUGH: 0
ABDOMINAL PAIN: 0
EYE DISCHARGE: 0
BLOOD IN STOOL: 0
BACK PAIN: 0
EYE REDNESS: 0
EYES NEGATIVE: 1
SORE THROAT: 0
GASTROINTESTINAL NEGATIVE: 1
DIARRHEA: 0
CONSTIPATION: 0
RESPIRATORY NEGATIVE: 1
WHEEZING: 0
VOMITING: 0
SHORTNESS OF BREATH: 0
EYE PAIN: 0

## 2022-07-12 ENCOUNTER — HOSPITAL ENCOUNTER (OUTPATIENT)
Dept: WOMENS IMAGING | Age: 62
Discharge: HOME | End: 2022-07-12
Payer: COMMERCIAL

## 2022-07-12 DIAGNOSIS — Z78.0 POST-MENOPAUSAL: ICD-10-CM

## 2022-07-12 PROCEDURE — 77080 DXA BONE DENSITY AXIAL: CPT

## 2022-07-12 PROCEDURE — 77080 DXA BONE DENSITY AXIAL: CPT | Performed by: RADIOLOGY

## 2022-11-16 ENCOUNTER — OFFICE VISIT (OUTPATIENT)
Dept: PRIMARY CARE CLINIC | Age: 62
End: 2022-11-16
Payer: COMMERCIAL

## 2022-11-16 VITALS
TEMPERATURE: 97.4 F | HEART RATE: 70 BPM | OXYGEN SATURATION: 95 % | DIASTOLIC BLOOD PRESSURE: 64 MMHG | HEIGHT: 63 IN | BODY MASS INDEX: 24.66 KG/M2 | SYSTOLIC BLOOD PRESSURE: 136 MMHG | WEIGHT: 139.2 LBS

## 2022-11-16 DIAGNOSIS — E78.2 MIXED HYPERLIPIDEMIA: ICD-10-CM

## 2022-11-16 DIAGNOSIS — M81.0 OSTEOPOROSIS, UNSPECIFIED OSTEOPOROSIS TYPE, UNSPECIFIED PATHOLOGICAL FRACTURE PRESENCE: ICD-10-CM

## 2022-11-16 DIAGNOSIS — E11.9 TYPE 2 DIABETES MELLITUS WITHOUT COMPLICATION, WITHOUT LONG-TERM CURRENT USE OF INSULIN (HCC): ICD-10-CM

## 2022-11-16 DIAGNOSIS — I10 ESSENTIAL HYPERTENSION: ICD-10-CM

## 2022-11-16 DIAGNOSIS — Z00.00 ANNUAL PHYSICAL EXAM: Primary | ICD-10-CM

## 2022-11-16 LAB
ALBUMIN SERPL-MCNC: 4.7 G/DL (ref 3.5–5.2)
ALP BLD-CCNC: 50 U/L (ref 35–104)
ALT SERPL-CCNC: 28 U/L (ref 5–33)
ANION GAP SERPL CALCULATED.3IONS-SCNC: 8 MMOL/L (ref 7–19)
AST SERPL-CCNC: 27 U/L (ref 5–32)
BASOPHILS ABSOLUTE: 0 K/UL (ref 0–0.2)
BASOPHILS RELATIVE PERCENT: 0.6 % (ref 0–1)
BILIRUB SERPL-MCNC: 0.6 MG/DL (ref 0.2–1.2)
BUN BLDV-MCNC: 14 MG/DL (ref 8–23)
CALCIUM SERPL-MCNC: 11 MG/DL (ref 8.8–10.2)
CHLORIDE BLD-SCNC: 103 MMOL/L (ref 98–111)
CHOLESTEROL, TOTAL: 154 MG/DL (ref 160–199)
CO2: 31 MMOL/L (ref 22–29)
CREAT SERPL-MCNC: 0.6 MG/DL (ref 0.5–0.9)
CREATININE URINE: 85.5 MG/DL (ref 4.2–622)
EOSINOPHILS ABSOLUTE: 0.2 K/UL (ref 0–0.6)
EOSINOPHILS RELATIVE PERCENT: 2.1 % (ref 0–5)
GFR SERPL CREATININE-BSD FRML MDRD: >60 ML/MIN/{1.73_M2}
GLUCOSE BLD-MCNC: 137 MG/DL (ref 74–109)
HBA1C MFR BLD: 7.9 % (ref 4–6)
HCT VFR BLD CALC: 42.7 % (ref 37–47)
HDLC SERPL-MCNC: 65 MG/DL (ref 65–121)
HEMOGLOBIN: 14 G/DL (ref 12–16)
IMMATURE GRANULOCYTES #: 0 K/UL
LDL CHOLESTEROL CALCULATED: 73 MG/DL
LYMPHOCYTES ABSOLUTE: 2.5 K/UL (ref 1.1–4.5)
LYMPHOCYTES RELATIVE PERCENT: 34.6 % (ref 20–40)
MCH RBC QN AUTO: 31.6 PG (ref 27–31)
MCHC RBC AUTO-ENTMCNC: 32.8 G/DL (ref 33–37)
MCV RBC AUTO: 96.4 FL (ref 81–99)
MICROALBUMIN UR-MCNC: 2.2 MG/DL (ref 0–19)
MICROALBUMIN/CREAT UR-RTO: 25.7 MG/G
MONOCYTES ABSOLUTE: 0.5 K/UL (ref 0–0.9)
MONOCYTES RELATIVE PERCENT: 7.5 % (ref 0–10)
NEUTROPHILS ABSOLUTE: 3.9 K/UL (ref 1.5–7.5)
NEUTROPHILS RELATIVE PERCENT: 55.1 % (ref 50–65)
PDW BLD-RTO: 12.4 % (ref 11.5–14.5)
PLATELET # BLD: 241 K/UL (ref 130–400)
PMV BLD AUTO: 10.7 FL (ref 9.4–12.3)
POTASSIUM SERPL-SCNC: 4.9 MMOL/L (ref 3.5–5)
RBC # BLD: 4.43 M/UL (ref 4.2–5.4)
SODIUM BLD-SCNC: 142 MMOL/L (ref 136–145)
TOTAL PROTEIN: 7.1 G/DL (ref 6.6–8.7)
TRIGL SERPL-MCNC: 79 MG/DL (ref 0–149)
TSH SERPL DL<=0.05 MIU/L-ACNC: 1.29 UIU/ML (ref 0.27–4.2)
WBC # BLD: 7.2 K/UL (ref 4.8–10.8)

## 2022-11-16 PROCEDURE — 90674 CCIIV4 VAC NO PRSV 0.5 ML IM: CPT | Performed by: NURSE PRACTITIONER

## 2022-11-16 PROCEDURE — 99396 PREV VISIT EST AGE 40-64: CPT | Performed by: NURSE PRACTITIONER

## 2022-11-16 PROCEDURE — 3078F DIAST BP <80 MM HG: CPT | Performed by: NURSE PRACTITIONER

## 2022-11-16 PROCEDURE — 3074F SYST BP LT 130 MM HG: CPT | Performed by: NURSE PRACTITIONER

## 2022-11-16 PROCEDURE — 99213 OFFICE O/P EST LOW 20 MIN: CPT | Performed by: NURSE PRACTITIONER

## 2022-11-16 PROCEDURE — 90471 IMMUNIZATION ADMIN: CPT | Performed by: NURSE PRACTITIONER

## 2022-11-16 RX ORDER — TAMOXIFEN CITRATE 20 MG/1
TABLET ORAL
Qty: 90 TABLET | Refills: 1 | Status: SHIPPED | OUTPATIENT
Start: 2022-11-16

## 2022-11-16 ASSESSMENT — ENCOUNTER SYMPTOMS
COLOR CHANGE: 0
PHOTOPHOBIA: 0
VOMITING: 0
VOICE CHANGE: 0
RHINORRHEA: 0
SHORTNESS OF BREATH: 0
COUGH: 0
BACK PAIN: 0
NAUSEA: 0

## 2022-11-16 NOTE — PROGRESS NOTES
[address] [facility]  [address] [facility] [address]  [address] [address]  Dept: [phone]  Dept Fax: [phone]  Loc: [phone]    Jonatan Morris is a 57 y.o. female who presents today for her medical conditions/complaints as noted below. Jonatan Morris is c/o of Follow-up (No new issues or concerns)        HPI:     HPI   Chief Complaint   Patient presents with    Follow-up     No new issues or concerns     Patient presents today for annual physical and follow-up diabetes, hyperlipidemia, hypertension. Blood pressure is well-controlled on medications. She had fasting labs this morning. She reports blood sugars typically around 150 fasting. She is due for flu vaccine today.      Past Medical History:   Diagnosis Date    Abdominal hernia     Breast cancer ([address]     [facility] [address]     breast cancer    Carotid artery plaque     9/16 <50% rt;  50-69% left    Essential hypertension     H/O abnormal cervical Papanicolaou smear     Mixed hyperlipidemia     Osteopenia     Simple goiter     Type 2 diabetes mellitus without complication ([address]     Umbilical hernia       Past Surgical History:   Procedure Laterality Date    BREAST LUMPECTOMY      CHOLECYSTECTOMY  1997    COLONOSCOPY      COLPOSCOPY      6/07 Dr Solo Sites, no dysplasia    [place] BX/REMV,LYMPH NODE,DEEP AXILL Left 11/6/2018    BREAST BIOPSY SENTINEL NODE performed by Paz Favre, MD at [address] [facility], PARTIAL Left 9/28/2018    BREAST LUMPECTOMY AND INTRAOPERATIVE ULTRASOUND GUIDED NEEDLE LOCALIZATION AND IORT performed by Paz Favre, MD at [address] [facility] 11/16/2022 6/21/2022 5/16/2022 5/11/2022 2/14/2022 33/09/5833   SYSTOLIC 183 [phone]   DIASTOLIC 64 66 68 72 68 60   Site - - Right Upper Arm Right Upper Arm - -   Position - - Sitting Sitting - -   Cuff Size - - - Medium Adult - -   Pulse 70 80 72 76 70 103   Temp 97.4 - 97.9 - 97.1 -   Resp - - - - - -   SpO2 95 97 98 - 98 98   Weight 139 lb 3.2 oz - 137 lb - 142 lb 141 lb   Height 5' 3\" 5' 3\" 5' 3\" - 5' 3\" -   Body mass index 24.66 kg/m2 - 24.27 kg/m2 - 25.15 kg/m2 -   Some recent data might be hidden       Family History   Problem Relation Age of Onset    Diabetes Father     Heart Disease Father     High Blood Pressure Father     Cancer Father         Pancreatic cancer-  at 80    Coronary Art Dis Father     Coronary Art Dis Mother        Social History     Tobacco Use    Smoking status: Never    Smokeless tobacco: Never   Substance Use Topics    Alcohol use: No      Current Outpatient Medications on File Prior to Visit   Medication Sig Dispense Refill    triamcinolone (KENALOG) 0.025 % cream Apply Topically 1 each 0    montelukast (SINGULAIR) 10 MG tablet TAKE ONE (1) TABLET BY MOUTH DAILY 90 tablet 3    irbesartan (AVAPRO) 75 MG tablet TAKE ONE-HALF TABLET BY MOUTH DAILY. GENERIC EQUIVALENT FOR AVAPRO 45 tablet 3    atorvastatin (LIPITOR) 40 MG tablet TAKE ONE (1) TABLET BY MOUTH DAILY 90 tablet 3    Liraglutide (VICTOZA) 18 MG/3ML SOPN SC injection Inject 1.2 mg into the skin daily 18 mL 3    metFORMIN (GLUCOPHAGE XR) 500 MG extended release tablet Take 1 tablet by mouth daily (with breakfast) 90 tablet 2    blood glucose monitor strips Test blood sugars daily. 100 strip 5    nystatin (MYCOSTATIN) 821177 UNIT/GM powder Apply 3 times daily. 1 Bottle 2    Insulin Pen Needle 32G X 4 MM MISC 1 each by Does not apply route daily 100 each 3    MICROLET LANCETS MISC USE TO TEST ONCE DAILY 100 each 3    Cyanocobalamin (VITAMIN B 12 PO) Take by mouth      BD PEN NEEDLE PRIYANKA U/F 32G X 4 MM MISC USE AS DIRECTED WITH VICTOZA 100 each 5    Nutritional Supplements (DHEA PO) Take by mouth      Calcium Citrate-Vitamin D (CALCIUM CITRATE + D PO) Take by mouth      blood glucose monitor kit and supplies 1 kit by Other route daily Test 1 times a day & as needed for symptoms of irregular blood glucose.  1 kit 0    folic acid (FOLVITE) 1 MG tablet Take 1 mg by mouth daily       Ascorbic Acid (VITAMIN C) 500 MG tablet Take 500 mg by mouth daily       No current facility-administered medications on file prior to visit. No Known Allergies    Health Maintenance   Topic Date Due    COVID-19 Vaccine (1) Never done    HIV screen  Never done    Hepatitis C screen  Never done    Diabetic retinal exam  06/28/2018    Diabetic foot exam  02/09/2022    Flu vaccine (1) 08/01/2022    Breast cancer screen  11/15/2022    Depression Screen  02/14/2023    A1C test (Diabetic or Prediabetic)  05/16/2023    Diabetic microalbuminuria test  05/16/2023    Lipids  05/16/2023    Colorectal Cancer Screen  07/16/2023    Pneumococcal 0-64 years Vaccine (3 - PPSV23 if available, else PCV20) 08/07/2023    Cervical cancer screen  01/08/2025    DTaP/Tdap/Td vaccine (2 - Td or Tdap) 08/07/2028    Shingles vaccine  Completed    Hepatitis A vaccine  Aged Out    Hib vaccine  Aged Out    Meningococcal (ACWY) vaccine  Aged Out       Subjective:      Review of Systems   Constitutional:  Negative for chills and fever. HENT:  Negative for ear pain, hearing loss, rhinorrhea and voice change. Eyes:  Negative for photophobia and visual disturbance. Respiratory:  Negative for cough and shortness of breath. Cardiovascular:  Negative for chest pain and palpitations. Gastrointestinal:  Negative for nausea and vomiting. Endocrine: Negative. Negative for cold intolerance and heat intolerance. Genitourinary:  Negative for difficulty urinating and flank pain. Musculoskeletal:  Negative for back pain and neck pain. Skin:  Negative for color change and rash. Allergic/Immunologic: Negative for environmental allergies and food allergies. Neurological:  Negative for dizziness, speech difficulty and headaches. Hematological:  Does not bruise/bleed easily. Psychiatric/Behavioral:  Negative for sleep disturbance and suicidal ideas. Objective:     Physical Exam  Vitals and nursing note reviewed. Constitutional:       Appearance: She is well-developed. HENT:      Head: Atraumatic. Right Ear: External ear normal.      Left Ear: External ear normal.      Nose: Nose normal.   Eyes:      Conjunctiva/sclera: Conjunctivae normal.      Pupils: Pupils are equal, round, and reactive to light. Cardiovascular:      Rate and Rhythm: Normal rate and regular rhythm. Heart sounds: Normal heart sounds, S1 normal and S2 normal.   Pulmonary:      Effort: Pulmonary effort is normal.      Breath sounds: Normal breath sounds. Abdominal:      General: Bowel sounds are normal.      Palpations: Abdomen is soft. Musculoskeletal:         General: Normal range of motion. Cervical back: Normal range of motion and neck supple. Skin:     General: Skin is warm and dry. Neurological:      Mental Status: She is alert and oriented to person, place, and time. Psychiatric:         Behavior: Behavior normal.     /64   Pulse 70   Temp 97.4 °F (36.3 °C) (Temporal)   Ht 5' 3\" (1.6 m)   Wt 139 lb 3.2 oz (63.1 kg)   SpO2 95%   BMI 24.66 kg/m²     Assessment:       Diagnosis Orders   1. Annual physical exam        2. Essential hypertension        3. Type 2 diabetes mellitus without complication, without long-term current use of insulin ([address]        4. Mixed hyperlipidemia        5. Osteoporosis, unspecified osteoporosis type, unspecified pathological fracture presence            Plan:     Follow-up in 6 months for welcome to medicare annual visit. Flu vaccine today. PDMP Monitoring:    Last PDMP Anatoliy as Reviewed:  Review User Review Instant Review Result            Urine Drug Screenings (1 yr)    No resulted procedures found. Medication Contract and Consent for Opioid Use Documents Filed        No documents found                     Patient given educational materials -see patient instructions. Discussed use, benefit, and side effects of prescribed medications. All patient questions answered. Pt voiced understanding. Reviewed health maintenance. Instructed to continue currentmedications, diet and exercise. Patient agreed with treatment plan. Follow up as directed. MEDICATIONS:  Orders Placed This Encounter   Medications    tamoxifen (NOLVADEX) 20 MG tablet     Sig: TAKE ONE (1) TABLET BY MOUTH DAILY     Dispense:  90 tablet     Refill:  1         ORDERS:  Orders Placed This Encounter   Procedures    Influenza, FLUCELVAX, (age 10 mo+), IM, Preservative Free, 0.5 mL       Follow-up:  No follow-ups on file. PATIENT INSTRUCTIONS:  Patient Instructions   Follow-up in 6 months for welcome to medicare annual visit. Flu vaccine today. Electronically signed by JAVAN No on 11/16/2022 at 9:58 AM    EMR Dragon/transcription disclaimer:  Much of thisencounter note is electronic transcription/translation of spoken language to printed texts. The electronic translation of spoken language may be erroneous, or at times, nonsensical words or phrases may be inadvertentlytranscribed.   Although I have reviewed the note for such errors, some may still exist.

## 2022-12-06 ENCOUNTER — TELEPHONE (OUTPATIENT)
Dept: PRIMARY CARE CLINIC | Age: 62
End: 2022-12-06

## 2022-12-14 NOTE — PROGRESS NOTES
HISTORY OF PRESENT ILLNESS:    Ms. Hellen Nettles presents today for a 6-month breast exam followed by mammogram. This is following left lumpectomy and IORT on 9/28/2018    An ultrasound guided breast biopsy  on the left which revealed a 1.1  cm low grade  invasive mammary carcinoma. ER is positive at 99%. [place] is positive at 21%. Her-2 is negative by IHC. Ki67 is 7% and low    MammaPrint demonstrated a low risk luminal-A phenotype    MRI 8/20/2018     Impression   1. Postbiopsy changes in the left breast are consistent with the   biopsy-proven invasive breast cancer. 2. No additional sites of suspicious enhancement are identified in   bilateral breasts. 3. No MRI evidence of lymphadenopathy       PATHOLOGY REVEALS:  FINAL DIAGNOSIS:       A. 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 (pT1b, pNX). Tumor measures 0.9cm in greatest dimension. Tumor is 0.2cm from closest inked margin (anterior). Margins of excision are negative. B. Left breast, additional margin cranial, excision:       Benign breast parenchyma. Negative for malignancy. C. Left breast, additional caudal deep margin, excision:       Benign breast parenchyma. Negative for malignancy. She underwent sentinel node biopsy on 23/2/0886 without complications. She now comes in for follow-up. FINAL DIAGNOSIS:    Lymph nodes, left axilla, sentinel lymph node biopsy: Five lymph nodes  negative for metastatic carcinoma. She has no new complaints today. She denies weight loss or any other systemic symptoms. She has recently been diagnosed with osteoporosis and is concerned about continuing to take the Femara. She is due to get her first dose of Prolia next week with an additional dose in 6 months. I discussed her with Dr. Gerard Cam and switched her from Femara to tamoxifen.   The prescription has been sent to her and appropriate post operative changes. There are no dominant masses, no skin or nipple changes, and no axillary adenopathy. There is nothing suspicious for new carcinoma and no evidence of local tumor recurrence. She is currently taking Tamoxifen 20 mg daily. IMPRESSION:    Doing well s/p left lumpectomy with IORT on 9/28/2018     PLAN: Follow-up in 6 months for physical exam      I have seen, examined and reviewed this patient medication list, appropriate labs and imaging studies. I reviewed relevant medical records and others physicians notes. I discussed the plans of care with the patient. I answered all the questions to the patients satisfaction. I, Dr Sigifredo Wellington, personally performed the services described in this documentation as scribed by Jolene Patricia MA in my presence and is both accurate and complete. (Please note that portions of this note were completed with a voice recognition program. Efforts were made to edit the dictations but occasionally words are mis-transcribed.)  Over 50% of the total visit time of 25 minutes in face to face encounter with the patient, out of which more than 50% of the time was spent in counseling patient or family and coordination of care. Counseling included but was not limited to time spent reviewing labs, imaging studies/ treatment plan and answering questions.

## 2022-12-15 ENCOUNTER — HOSPITAL ENCOUNTER (OUTPATIENT)
Dept: WOMENS IMAGING | Age: 62
Discharge: HOME | End: 2022-12-15
Payer: COMMERCIAL

## 2022-12-15 ENCOUNTER — OFFICE VISIT (OUTPATIENT)
Dept: SURGERY | Age: 62
End: 2022-12-15
Payer: COMMERCIAL

## 2022-12-15 VITALS — SYSTOLIC BLOOD PRESSURE: 122 MMHG | DIASTOLIC BLOOD PRESSURE: 72 MMHG | HEART RATE: 76 BPM

## 2022-12-15 DIAGNOSIS — Z17.0 MALIGNANT NEOPLASM OF OVERLAPPING SITES OF LEFT BREAST IN FEMALE, ESTROGEN RECEPTOR POSITIVE (HCC): ICD-10-CM

## 2022-12-15 DIAGNOSIS — C50.912 INVASIVE DUCTAL CARCINOMA OF BREAST, LEFT (HCC): Primary | ICD-10-CM

## 2022-12-15 DIAGNOSIS — Z98.890 STATUS POST LEFT BREAST LUMPECTOMY: ICD-10-CM

## 2022-12-15 DIAGNOSIS — C50.812 MALIGNANT NEOPLASM OF OVERLAPPING SITES OF LEFT BREAST IN FEMALE, ESTROGEN RECEPTOR POSITIVE (HCC): ICD-10-CM

## 2022-12-15 PROCEDURE — 3074F SYST BP LT 130 MM HG: CPT | Performed by: SURGERY

## 2022-12-15 PROCEDURE — 77066 DX MAMMO INCL CAD BI: CPT

## 2022-12-15 PROCEDURE — 99213 OFFICE O/P EST LOW 20 MIN: CPT | Performed by: SURGERY

## 2022-12-15 PROCEDURE — 3078F DIAST BP <80 MM HG: CPT | Performed by: SURGERY

## 2022-12-19 NOTE — PROGRESS NOTES
Progress Note      Pt Name: Naresh Schroeder  YOB: 1960  MRN: [id number]    Date of evaluation: 12/20/2022    History Obtained From:  patient, electronic medical record    CHIEF COMPLAINT:    Chief Complaint   Patient presents with    Follow-up     Malignant neoplasm of overlapping sites of left breast in female, estrogen receptor positive ([facility] [address]    Osteoporosis     HISTORY OF PRESENT ILLNESS:    Naresh Schroeder is a 57 y.o.  female who is being followed for invasive low-grade ductal carcinoma of the left breast, 8/1/2018 and osteoporosis. She is status post left lumpectomy and IORT on 9/28/2018. Current recommendation is for tamoxifen 20 mg daily and Prolia 60 mg subcu every 6 months for her osteoporosis, last dose given on 6/21/2022. Jennifer Agarwal returns today in scheduled follow-up for evaluation, lab monitoring, side effect monitoring and further treatment recommendations. She reports being compliant with her tamoxifen, has no new breast complaints and reports tolerating Prolia without significant difficulty. Today's clinic visit to include physical assessment, review of systems, any lab or radiographic findings that were available and plan of care are documented below.     ONCOLOGIC HISTORY:     Diagnosis:   Invasive low grade ductal carcinoma with associated intermediate grade ductal carcinoma in situ, 8/1/2018   ER 99%, MD 21%, HER-2/nancy 1+ by IHC   Ki-67 is 7%, low   pT1b, pN0   Low risk, luminal type A by Mammaprint   Tumor 1.1 cm    Treatment summary:  Letrozole 2.5 mg daily initiated in 8/29/2018   Left lumpectomy and IORT on 9/28/2018  Left axilla sentinel lymph node biopsy, total of 5 nodes negative, 11/6/2018  Initiated Prolia 60 mg subcu to be given every 6 months on 6/1/2020 5/27/2020-letrozole discontinued and initiated on tamoxifen 20 mg daily    Ms. Lorna Pedersen was seen in initial oncology consultation on 11/20/2018 for a recent diagnosis of invasive low grade ductal carcinoma of the left breast. She is status post left lumpectomy, left sentinel node biopsy, IORT and initiated on Femara by Dr. Jennifer Maradiaga. Steph Souza was referred from Dr. Carmen Bowles to establish care. Steph Souza presented with no specific complaints and indicated tolerating the Femara without difficulty. The following are pertinent events related to her diagnosis of breast cancer:  7/18/2018- bilateral mammogram documented an area of asymmetrical density deep in the upper lateral right breast is stable. An area of parenchymal distortion and spiculation deep in the lateral left breast, which is lateral and deep or to the area of prior surgery. BI-RADS Category 0   7/24/2018- unilateral coned mammogram of the left breast documented a spiculated 1.1 x 0.8 cm density with regional distortion. Persistent within the left lateral breast near the 2 to 3 o'clock position in the posterior depth. BI-RADS Category 4.   8/1/2018- Ultrasound-guided biopsy of the left breast revealed a 1.1 by 1.0 x 0.74 cm grade invasive mammary carcinoma. ER 99%, OK 21%, HER-2/nancy negative by IHC and Ki-67 is 7% and low. Mamma print low risk. 8/20/2018- MRI of the breast documented postoperative changes in the left breast consistent with the biopsy-proven invasive breast cancer. No additional sites of suspicious enhancement identified in bilateral breast and no evidence of lymphadenopathy. 8/29/2018- initiated letrozole 2.5 mg daily   9/28/2018- Dr. Carmen Bowles completed left lumpectomy with IORT. Pathology documented invasive low grade ductal carcinoma with associated intermediate grade ductal carcinoma in situ, pT1b,pNX. Tumor measured 0.9 cm in greatest dimension. Margins of excision were negative. 11/6/2018- [place] lymph node biopsy.  5 lymph nodes negative for metastatic carcinoma  11/20/2018-recommendation to continue letrozole 2.5 mg for a additional 5-year dosing  10/28/2019-bilateral mammogram documented no mammographic evidence of malignancy, BI-RADS Category 2.  5/27/2020-Dr. Melva Houser discontinued the letrozole and initiated tamoxifen due to osteoporosis  6/1/2020-Prolia 60 mg subcu every 6 months initiated for osteoporosis  Bilateral diagnostic mammogram on 11/11/2020 documented no mammographic evidence of malignancy  11/15/2021 Bilateral mammogram- Postsurgical changes left breast. No malignant features. Age-appropriate health screening:  Bone mineral density study on 5/27/2020 documented osteoporosis with a femoral neck T score of -2.9 and a lumbar spine T score of -2.7.  07/12/2022 Bone density- osteoporosis; left femoral neck T score -2.3; lumbar spine T score -2.7    Past Medical History:    Past Medical History:   Diagnosis Date    Abdominal hernia     Breast cancer ([facility] [address]     Cancer ([facility] [address]     breast cancer    Carotid artery plaque     9/16 <50% rt;  50-69% left    Essential hypertension     H/O abnormal cervical Papanicolaou smear     Mixed hyperlipidemia     Osteopenia     Simple goiter     Type 2 diabetes mellitus without complication ([facility] [address]     Umbilical hernia        Past Surgical History:    Past Surgical History:   Procedure Laterality Date    BREAST LUMPECTOMY      CHOLECYSTECTOMY  1997    COLONOSCOPY      COLPOSCOPY      6/07 Dr Cha Arellano, no dysplasia    ME BX/REMV,LYMPH NODE,DEEP AXILL Left 11/6/2018    BREAST BIOPSY SENTINEL NODE performed by Priyanka Foster MD at [address] [place] [address], PARTIAL Left 9/28/2018    BREAST LUMPECTOMY AND INTRAOPERATIVE ULTRASOUND GUIDED NEEDLE LOCALIZATION AND IORT performed by Priyanka Foster MD at [facility] -  CAMPUS OR       Current Medications:    Current Outpatient Medications   Medication Sig Dispense Refill    tamoxifen (NOLVADEX) 20 MG tablet TAKE ONE (1) TABLET BY MOUTH DAILY 90 tablet 1    triamcinolone (KENALOG) 0.025 % cream Apply Topically 1 each 0    montelukast (SINGULAIR) 10 MG tablet TAKE ONE (1) TABLET BY MOUTH DAILY 90 tablet 3    irbesartan (AVAPRO) 75 MG tablet TAKE ONE-HALF TABLET BY MOUTH DAILY. GENERIC EQUIVALENT FOR AVAPRO 45 tablet 3    atorvastatin (LIPITOR) 40 MG tablet TAKE ONE (1) TABLET BY MOUTH DAILY 90 tablet 3    Liraglutide (VICTOZA) 18 MG/3ML SOPN SC injection Inject 1.2 mg into the skin daily 18 mL 3    metFORMIN (GLUCOPHAGE XR) 500 MG extended release tablet Take 1 tablet by mouth daily (with breakfast) 90 tablet 2    blood glucose monitor strips Test blood sugars daily. 100 strip 5    nystatin (MYCOSTATIN) 760800 UNIT/GM powder Apply 3 times daily. 1 Bottle 2    Insulin Pen Needle 32G X 4 MM MISC 1 each by Does not apply route daily 100 each 3    MICROLET LANCETS MISC USE TO TEST ONCE DAILY 100 each 3    Cyanocobalamin (VITAMIN B 12 PO) Take by mouth      BD PEN NEEDLE PRIYANKA U/F 32G X 4 MM MISC USE AS DIRECTED WITH VICTOZA 100 each 5    Nutritional Supplements (DHEA PO) Take by mouth      Calcium Citrate-Vitamin D (CALCIUM CITRATE + D PO) Take by mouth      blood glucose monitor kit and supplies 1 kit by Other route daily Test 1 times a day & as needed for symptoms of irregular blood glucose. 1 kit 0    folic acid (FOLVITE) 1 MG tablet Take 1 mg by mouth daily       Ascorbic Acid (VITAMIN C) 500 MG tablet Take 500 mg by mouth daily       No current facility-administered medications for this visit. Allergies: No Known Allergies    Social History:    Social History     Tobacco Use    Smoking status: Never    Smokeless tobacco: Never   Substance Use Topics    Alcohol use: No    Drug use: No       Family History:   Family History   Problem Relation Age of Onset    Diabetes Father     Heart Disease Father     High Blood Pressure Father     Cancer Father         Pancreatic cancer-  at 80    Coronary Art Dis Father     Coronary Art Dis Mother        Vitals:  Vitals:    22 0944   BP: 132/75   Pulse: 90   SpO2: 98%   Weight: 141 lb 8 oz (64.2 kg)        Subjective   REVIEW OF SYSTEMS:   Review of Systems   Constitutional: Negative. Negative for chills, diaphoresis and fever.    HENT: Negative. Negative for congestion, ear pain, hearing loss, nosebleeds, sore throat and tinnitus. Eyes: Negative. Negative for pain, discharge and redness. Respiratory: Negative. Negative for cough, shortness of breath and wheezing. Cardiovascular: Negative. Negative for chest pain, palpitations and leg swelling. Gastrointestinal: Negative. Negative for abdominal pain, blood in stool, constipation, diarrhea, nausea and vomiting. Endocrine: Positive for heat intolerance (very occasional). Negative for polydipsia. Genitourinary:  Negative for dysuria, flank pain, frequency, hematuria and urgency. Musculoskeletal: Negative. Negative for back pain, myalgias and neck pain. Skin: Negative. Negative for rash. Neurological: Negative. Negative for dizziness, tremors, seizures, weakness and headaches. Hematological:  Does not bruise/bleed easily. Psychiatric/Behavioral: Negative. The patient is not nervous/anxious. Objective   PHYSICAL EXAM:  Physical Exam  Vitals reviewed. Constitutional:       General: She is not in acute distress. Appearance: She is well-developed. HENT:      Head: Normocephalic and atraumatic. Mouth/Throat:      Pharynx: Uvula midline. Tonsils: No tonsillar exudate. Eyes:      General: Lids are normal.      Conjunctiva/sclera: Conjunctivae normal.      Pupils: Pupils are equal, round, and reactive to light. Neck:      Thyroid: No thyroid mass or thyromegaly. Vascular: No JVD. Trachea: Trachea normal. No tracheal deviation. Cardiovascular:      Rate and Rhythm: Normal rate and regular rhythm. Pulses: Normal pulses. Heart sounds: Normal heart sounds. Pulmonary:      Effort: Pulmonary effort is normal. No respiratory distress. Breath sounds: Normal breath sounds. No wheezing or rales. Chest:      Chest wall: No tenderness. Abdominal:      General: Bowel sounds are normal. There is no distension.       Palpations: Abdomen is soft. There is no mass. Tenderness: There is no abdominal tenderness. There is no guarding. Musculoskeletal:         General: No tenderness or deformity. Cervical back: Normal range of motion and neck supple. Comments: Range of motion within normal limits x4 extremities   Skin:     General: Skin is warm. Findings: No bruising, erythema or rash. Neurological:      Mental Status: She is alert and oriented to person, place, and time. Cranial Nerves: No cranial nerve deficit. Coordination: Coordination normal.   Psychiatric:         Behavior: Behavior normal.         Thought Content: Thought content normal.       Labs:  Lab Results   Component Value Date    WBC 6.53 12/20/2022    HGB 12.5 12/20/2022    HCT 39.4 12/20/2022    MCV 98.7 (H) 12/20/2022     12/20/2022     Lab Results   Component Value Date    NEUTROABS 4.06 12/20/2022       ASSESSMENT/PLAN:      1. Invasive ductal carcinoma of breast, left ([address] status post lumpectomy/sentinel lymph node biopsy and IORT December 2018. Current recommendation is for adjuvant endocrine therapy with tamoxifen 20 mg p.o. daily for anticipated 10 years, through 8/20/2028. Reports compliant with tamoxifen and tolerating without significant difficulty. Denies any new breast complaints. Declined breast exam, recently completed by Dr. Margy Shi on 12/15/2022.    12/15/2022 Bilateral mammogram-no radiographic evidence of malignant changes, BI-RADS Category 2    Shane Claude was seen in scheduled follow-up by Dr Margy Shi on 12/15/2022, I reviewed the progress note from that office visit which documented bilateral breast exam with no new findings or concerns of recurrence.     -Continue tamoxifen 20 mg daily, in the setting of adjuvant therapy, no evidence of progression of disease, anticipate 10-year dosing anticipate completion date to be 8/20/2028  -Continue to follow with Dr. Margy Shi as recommended  - Encourage self breast exams    2. Osteoporosis: postmenopausal state and at increased risk for bone loss. A slight improvement was noted on 07/12/2022 Bone density- osteoporosis; left femoral neck T score -2.3; lumbar spine T score -2.7  Reports compliant with calcium and vitamin D supplement. Reports tolerating Prolia without difficulty. Vitamin D level of 60 on 5/16/2022    -Prolia 60 mg subcu delivered today, 12/20/2022 and is warranted every 6 months due to severe risk of fracture   -Continue calcium 1200 mg with vitamin D 1000 units daily    3. Essential hypertension, /75, within normal limits. I discussed all of the above findings included in the assessment and plan with the patient and the patient is in agreement to move forward with current recommendations/treatment. I have addressed all of their questions and concerns that were verbalized. FOLLOW UP:  Follow-up appointment given for 6 months, sooner if needed  Continue to follow with other medical providers as recommended  Labs at next visit: CBC: CMP and vitamin D level if not recently evaluated by another provider    EMR Dragon/Transcription disclaimer:   Much of this encounter note is an electronic transcription/translation of spoken language to printed text. The electronic translation of spoken language may permit erroneous, or at times, nonsensical words or phrases to be inadvertently transcribed; although attempts have made to review the note for such errors, some may still exist.  Please excuse any unrecognized transcription errors and contact us if the error is unintelligible or needs documented correction. Also, portions of this note have been copied forward, however, changed to reflect the most current clinical status of this patient. Electronically signed by JAVAN Rivas on 12/25/2022 at 8:55 AM  Dexter RANKIN am pre-charting as a registered nurse for JAVAN Aguilar.

## 2022-12-20 ENCOUNTER — OFFICE VISIT (OUTPATIENT)
Dept: HEMATOLOGY | Age: 62
End: 2022-12-20
Payer: COMMERCIAL

## 2022-12-20 ENCOUNTER — HOSPITAL ENCOUNTER (OUTPATIENT)
Dept: INFUSION THERAPY | Age: 62
Discharge: HOME | End: 2022-12-20
Payer: COMMERCIAL

## 2022-12-20 VITALS
SYSTOLIC BLOOD PRESSURE: 132 MMHG | DIASTOLIC BLOOD PRESSURE: 75 MMHG | WEIGHT: 141.5 LBS | BODY MASS INDEX: 25.07 KG/M2 | OXYGEN SATURATION: 98 % | HEART RATE: 90 BPM

## 2022-12-20 DIAGNOSIS — Z79.810 ENCOUNTER FOR MONITORING TAMOXIFEN THERAPY: ICD-10-CM

## 2022-12-20 DIAGNOSIS — Z78.0 POST-MENOPAUSAL: ICD-10-CM

## 2022-12-20 DIAGNOSIS — M81.0 OSTEOPOROSIS, UNSPECIFIED OSTEOPOROSIS TYPE, UNSPECIFIED PATHOLOGICAL FRACTURE PRESENCE: ICD-10-CM

## 2022-12-20 DIAGNOSIS — Z51.81 ENCOUNTER FOR MONITORING TAMOXIFEN THERAPY: ICD-10-CM

## 2022-12-20 DIAGNOSIS — M81.0 OSTEOPOROSIS WITHOUT CURRENT PATHOLOGICAL FRACTURE, UNSPECIFIED OSTEOPOROSIS TYPE: ICD-10-CM

## 2022-12-20 DIAGNOSIS — Z17.0 MALIGNANT NEOPLASM OF OVERLAPPING SITES OF LEFT BREAST IN FEMALE, ESTROGEN RECEPTOR POSITIVE (HCC): Primary | ICD-10-CM

## 2022-12-20 DIAGNOSIS — C50.812 MALIGNANT NEOPLASM OF OVERLAPPING SITES OF LEFT BREAST IN FEMALE, ESTROGEN RECEPTOR POSITIVE (HCC): Primary | ICD-10-CM

## 2022-12-20 DIAGNOSIS — I10 ESSENTIAL HYPERTENSION: ICD-10-CM

## 2022-12-20 LAB
BASOPHILS ABSOLUTE: 0.03 K/UL (ref 0.01–0.08)
BASOPHILS RELATIVE PERCENT: 0.5 % (ref 0.1–1.2)
EOSINOPHILS ABSOLUTE: 0.1 K/UL (ref 0.04–0.54)
EOSINOPHILS RELATIVE PERCENT: 1.5 % (ref 0.7–7)
HCT VFR BLD CALC: 39.4 % (ref 34.1–44.9)
HEMOGLOBIN: 12.5 G/DL (ref 11.2–15.7)
LYMPHOCYTES ABSOLUTE: 1.93 K/UL (ref 1.18–3.74)
LYMPHOCYTES RELATIVE PERCENT: 29.6 % (ref 19.3–53.1)
MCH RBC QN AUTO: 31.3 PG (ref 25.6–32.2)
MCHC RBC AUTO-ENTMCNC: 31.7 G/DL (ref 32.3–35.5)
MCV RBC AUTO: 98.7 FL (ref 79.4–94.8)
MONOCYTES ABSOLUTE: 0.4 K/UL (ref 0.24–0.82)
MONOCYTES RELATIVE PERCENT: 6.1 % (ref 4.7–12.5)
NEUTROPHILS ABSOLUTE: 4.06 K/UL (ref 1.56–6.13)
NEUTROPHILS RELATIVE PERCENT: 62.1 % (ref 34–71.1)
PDW BLD-RTO: 12.5 % (ref 11.7–14.4)
PLATELET # BLD: 206 K/UL (ref 182–369)
PMV BLD AUTO: 10 FL (ref 7.4–10.4)
RBC # BLD: 3.99 M/UL (ref 3.93–5.22)
WBC # BLD: 6.53 K/UL (ref 3.98–10.04)

## 2022-12-20 PROCEDURE — 36415 COLL VENOUS BLD VENIPUNCTURE: CPT

## 2022-12-20 PROCEDURE — 96372 THER/PROPH/DIAG INJ SC/IM: CPT

## 2022-12-20 PROCEDURE — 99212 OFFICE O/P EST SF 10 MIN: CPT

## 2022-12-20 PROCEDURE — 3078F DIAST BP <80 MM HG: CPT | Performed by: NURSE PRACTITIONER

## 2022-12-20 PROCEDURE — 6360000002 HC RX W HCPCS: Performed by: NURSE PRACTITIONER

## 2022-12-20 PROCEDURE — 3074F SYST BP LT 130 MM HG: CPT | Performed by: NURSE PRACTITIONER

## 2022-12-20 PROCEDURE — 85025 COMPLETE CBC W/AUTO DIFF WBC: CPT

## 2022-12-20 PROCEDURE — 99214 OFFICE O/P EST MOD 30 MIN: CPT | Performed by: NURSE PRACTITIONER

## 2022-12-20 RX ORDER — SODIUM CHLORIDE 9 MG/ML
INJECTION, SOLUTION INTRAVENOUS CONTINUOUS
OUTPATIENT
Start: 2023-06-20

## 2022-12-20 RX ORDER — FAMOTIDINE 10 MG/ML
20 INJECTION, SOLUTION INTRAVENOUS ONCE
OUTPATIENT
Start: 2023-06-20 | End: 2023-06-20

## 2022-12-20 RX ORDER — DIPHENHYDRAMINE HYDROCHLORIDE 50 MG/ML
50 INJECTION INTRAMUSCULAR; INTRAVENOUS ONCE
OUTPATIENT
Start: 2023-06-20 | End: 2023-06-20

## 2022-12-20 RX ORDER — EPINEPHRINE 1 MG/ML
0.3 INJECTION, SOLUTION, CONCENTRATE INTRAVENOUS PRN
OUTPATIENT
Start: 2023-06-20

## 2022-12-20 RX ORDER — METHYLPREDNISOLONE SODIUM SUCCINATE 125 MG/2ML
125 INJECTION, POWDER, LYOPHILIZED, FOR SOLUTION INTRAMUSCULAR; INTRAVENOUS ONCE
OUTPATIENT
Start: 2023-06-20 | End: 2023-06-20

## 2022-12-20 RX ADMIN — DENOSUMAB 60 MG: 60 INJECTION SUBCUTANEOUS at 10:13

## 2022-12-25 ASSESSMENT — ENCOUNTER SYMPTOMS
BLOOD IN STOOL: 0
SORE THROAT: 0
GASTROINTESTINAL NEGATIVE: 1
CONSTIPATION: 0
EYE PAIN: 0
WHEEZING: 0
NAUSEA: 0
EYES NEGATIVE: 1
COUGH: 0
BACK PAIN: 0
RESPIRATORY NEGATIVE: 1
EYE REDNESS: 0
DIARRHEA: 0
EYE DISCHARGE: 0
SHORTNESS OF BREATH: 0
ABDOMINAL PAIN: 0
VOMITING: 0

## 2023-03-24 DIAGNOSIS — E78.2 MIXED HYPERLIPIDEMIA: ICD-10-CM

## 2023-03-24 DIAGNOSIS — E11.9 TYPE 2 DIABETES MELLITUS WITHOUT COMPLICATION, WITHOUT LONG-TERM CURRENT USE OF INSULIN (HCC): ICD-10-CM

## 2023-03-24 DIAGNOSIS — I10 ESSENTIAL HYPERTENSION: ICD-10-CM

## 2023-03-24 RX ORDER — TAMOXIFEN CITRATE 20 MG/1
TABLET ORAL
Qty: 90 TABLET | Refills: 2 | Status: SHIPPED | OUTPATIENT
Start: 2023-03-24

## 2023-03-24 RX ORDER — IRBESARTAN 75 MG/1
TABLET ORAL
Qty: 135 TABLET | Refills: 2 | Status: SHIPPED | OUTPATIENT
Start: 2023-03-24

## 2023-03-24 RX ORDER — LIRAGLUTIDE 6 MG/ML
1.2 INJECTION SUBCUTANEOUS DAILY
Qty: 18 ML | Refills: 2 | Status: SHIPPED | OUTPATIENT
Start: 2023-03-24

## 2023-03-24 RX ORDER — MONTELUKAST SODIUM 10 MG/1
TABLET ORAL
Qty: 90 TABLET | Refills: 2 | Status: SHIPPED | OUTPATIENT
Start: 2023-03-24

## 2023-03-24 RX ORDER — METFORMIN HYDROCHLORIDE 500 MG/1
500 TABLET, EXTENDED RELEASE ORAL
Qty: 90 TABLET | Refills: 2 | Status: SHIPPED | OUTPATIENT
Start: 2023-03-24

## 2023-03-24 RX ORDER — ATORVASTATIN CALCIUM 40 MG/1
TABLET, FILM COATED ORAL
Qty: 90 TABLET | Refills: 2 | Status: SHIPPED | OUTPATIENT
Start: 2023-03-24

## 2023-03-24 NOTE — TELEPHONE ENCOUNTER
Carmen Ramsay called to request a refill on her medication. Last office visit : 11/16/2022   Next office visit : 5/22/2023     Requested Prescriptions     Pending Prescriptions Disp Refills    tamoxifen (NOLVADEX) 20 MG tablet 90 tablet 2     Sig: TAKE ONE (1) TABLET BY MOUTH DAILY    metFORMIN (GLUCOPHAGE XR) 500 MG extended release tablet 90 tablet 2     Sig: Take 1 tablet by mouth daily (with breakfast)    atorvastatin (LIPITOR) 40 MG tablet 90 tablet 2     Sig: TAKE ONE (1) TABLET BY MOUTH DAILY    irbesartan (AVAPRO) 75 MG tablet 135 tablet 2     Sig: TAKE ONE-HALF TABLET BY MOUTH DAILY.  GENERIC EQUIVALENT FOR AVAPRO    montelukast (SINGULAIR) 10 MG tablet 90 tablet 2     Sig: TAKE ONE (1) TABLET BY MOUTH DAILY    Liraglutide (VICTOZA) 18 MG/3ML SOPN SC injection 18 mL 2     Sig: Inject 1.2 mg into the skin daily            Erlinda Ho LPN

## 2023-03-24 NOTE — TELEPHONE ENCOUNTER
Dionna Boss called requesting a refill of the below medication which has been pended for you:   Patient called she is changing pharmacy to [address] [place] [address] due to insurance. Requested Prescriptions     Pending Prescriptions Disp Refills    tamoxifen (NOLVADEX) 20 MG tablet 90 tablet 2     Sig: TAKE ONE (1) TABLET BY MOUTH DAILY    metFORMIN (GLUCOPHAGE XR) 500 MG extended release tablet 90 tablet 2     Sig: Take 1 tablet by mouth daily (with breakfast)    atorvastatin (LIPITOR) 40 MG tablet 90 tablet 2     Sig: TAKE ONE (1) TABLET BY MOUTH DAILY    irbesartan (AVAPRO) 75 MG tablet 135 tablet 2     Sig: TAKE ONE-HALF TABLET BY MOUTH DAILY.  GENERIC EQUIVALENT FOR AVAPRO    montelukast (SINGULAIR) 10 MG tablet 90 tablet 2     Sig: TAKE ONE (1) TABLET BY MOUTH DAILY    Liraglutide (VICTOZA) 18 MG/3ML SOPN SC injection 18 mL 2     Sig: Inject 1.2 mg into the skin daily       Last Appointment Date: 11/16/2022  Next Appointment Date: 5/22/2023    No Known Allergies

## 2023-04-19 ENCOUNTER — OFFICE VISIT (OUTPATIENT)
Dept: PRIMARY CARE CLINIC | Age: 63
End: 2023-04-19
Payer: MEDICARE

## 2023-04-19 VITALS
OXYGEN SATURATION: 97 % | TEMPERATURE: 97 F | BODY MASS INDEX: 24.66 KG/M2 | DIASTOLIC BLOOD PRESSURE: 74 MMHG | WEIGHT: 139.2 LBS | HEIGHT: 63 IN | HEART RATE: 87 BPM | SYSTOLIC BLOOD PRESSURE: 132 MMHG

## 2023-04-19 DIAGNOSIS — E78.2 MIXED HYPERLIPIDEMIA: ICD-10-CM

## 2023-04-19 DIAGNOSIS — I10 ESSENTIAL HYPERTENSION: Primary | ICD-10-CM

## 2023-04-19 DIAGNOSIS — E11.9 TYPE 2 DIABETES MELLITUS WITHOUT COMPLICATION, WITHOUT LONG-TERM CURRENT USE OF INSULIN (HCC): ICD-10-CM

## 2023-04-19 PROCEDURE — 3074F SYST BP LT 130 MM HG: CPT | Performed by: NURSE PRACTITIONER

## 2023-04-19 PROCEDURE — G8420 CALC BMI NORM PARAMETERS: HCPCS | Performed by: NURSE PRACTITIONER

## 2023-04-19 PROCEDURE — 99213 OFFICE O/P EST LOW 20 MIN: CPT | Performed by: NURSE PRACTITIONER

## 2023-04-19 PROCEDURE — G8399 PT W/DXA RESULTS DOCUMENT: HCPCS | Performed by: NURSE PRACTITIONER

## 2023-04-19 PROCEDURE — 3017F COLORECTAL CA SCREEN DOC REV: CPT | Performed by: NURSE PRACTITIONER

## 2023-04-19 PROCEDURE — 3046F HEMOGLOBIN A1C LEVEL >9.0%: CPT | Performed by: NURSE PRACTITIONER

## 2023-04-19 PROCEDURE — 1090F PRES/ABSN URINE INCON ASSESS: CPT | Performed by: NURSE PRACTITIONER

## 2023-04-19 PROCEDURE — 1123F ACP DISCUSS/DSCN MKR DOCD: CPT | Performed by: NURSE PRACTITIONER

## 2023-04-19 PROCEDURE — 3078F DIAST BP <80 MM HG: CPT | Performed by: NURSE PRACTITIONER

## 2023-04-19 PROCEDURE — 2022F DILAT RTA XM EVC RTNOPTHY: CPT | Performed by: NURSE PRACTITIONER

## 2023-04-19 PROCEDURE — 1036F TOBACCO NON-USER: CPT | Performed by: NURSE PRACTITIONER

## 2023-04-19 PROCEDURE — G8427 DOCREV CUR MEDS BY ELIG CLIN: HCPCS | Performed by: NURSE PRACTITIONER

## 2023-04-19 RX ORDER — CEFDINIR 300 MG/1
300 CAPSULE ORAL 2 TIMES DAILY
Qty: 20 CAPSULE | Refills: 0 | Status: SHIPPED | OUTPATIENT
Start: 2023-04-19 | End: 2023-04-29

## 2023-04-19 RX ORDER — SODIUM CHLORIDE 9 MG/ML
INJECTION, SOLUTION INTRAVENOUS CONTINUOUS
OUTPATIENT
Start: 2023-04-19

## 2023-04-19 RX ORDER — ALBUTEROL SULFATE 90 UG/1
2 AEROSOL, METERED RESPIRATORY (INHALATION) 4 TIMES DAILY PRN
Qty: 18 G | Refills: 0 | Status: SHIPPED | OUTPATIENT
Start: 2023-04-19

## 2023-04-19 RX ORDER — DIPHENHYDRAMINE HYDROCHLORIDE 50 MG/ML
50 INJECTION INTRAMUSCULAR; INTRAVENOUS
OUTPATIENT
Start: 2023-04-19

## 2023-04-19 RX ORDER — BENZONATATE 200 MG/1
200 CAPSULE ORAL 3 TIMES DAILY PRN
Qty: 30 CAPSULE | Refills: 0 | Status: SHIPPED | OUTPATIENT
Start: 2023-04-19

## 2023-04-19 RX ORDER — DEXTROMETHORPHAN HYDROBROMIDE AND PROMETHAZINE HYDROCHLORIDE 15; 6.25 MG/5ML; MG/5ML
5 SYRUP ORAL 4 TIMES DAILY PRN
Qty: 180 ML | Refills: 0 | Status: SHIPPED | OUTPATIENT
Start: 2023-04-19 | End: 2023-04-26

## 2023-04-19 RX ORDER — ALBUTEROL SULFATE 90 UG/1
4 AEROSOL, METERED RESPIRATORY (INHALATION) PRN
OUTPATIENT
Start: 2023-04-19

## 2023-04-19 RX ORDER — ACETAMINOPHEN 325 MG/1
650 TABLET ORAL
OUTPATIENT
Start: 2023-04-19

## 2023-04-19 RX ORDER — EPINEPHRINE 1 MG/ML
0.3 INJECTION, SOLUTION, CONCENTRATE INTRAVENOUS PRN
OUTPATIENT
Start: 2023-04-19

## 2023-04-19 RX ORDER — ONDANSETRON 2 MG/ML
8 INJECTION INTRAMUSCULAR; INTRAVENOUS
OUTPATIENT
Start: 2023-04-19

## 2023-04-19 SDOH — ECONOMIC STABILITY: INCOME INSECURITY: HOW HARD IS IT FOR YOU TO PAY FOR THE VERY BASICS LIKE FOOD, HOUSING, MEDICAL CARE, AND HEATING?: NOT HARD AT ALL

## 2023-04-19 SDOH — ECONOMIC STABILITY: HOUSING INSECURITY
IN THE LAST 12 MONTHS, WAS THERE A TIME WHEN YOU DID NOT HAVE A STEADY PLACE TO SLEEP OR SLEPT IN A SHELTER (INCLUDING NOW)?: NO

## 2023-04-19 SDOH — ECONOMIC STABILITY: FOOD INSECURITY: WITHIN THE PAST 12 MONTHS, THE FOOD YOU BOUGHT JUST DIDN'T LAST AND YOU DIDN'T HAVE MONEY TO GET MORE.: NEVER TRUE

## 2023-04-19 SDOH — ECONOMIC STABILITY: FOOD INSECURITY: WITHIN THE PAST 12 MONTHS, YOU WORRIED THAT YOUR FOOD WOULD RUN OUT BEFORE YOU GOT MONEY TO BUY MORE.: NEVER TRUE

## 2023-04-19 ASSESSMENT — PATIENT HEALTH QUESTIONNAIRE - PHQ9
SUM OF ALL RESPONSES TO PHQ QUESTIONS 1-9: 0
SUM OF ALL RESPONSES TO PHQ QUESTIONS 1-9: 0
SUM OF ALL RESPONSES TO PHQ9 QUESTIONS 1 & 2: 0
2. FEELING DOWN, DEPRESSED OR HOPELESS: 0
SUM OF ALL RESPONSES TO PHQ QUESTIONS 1-9: 0
SUM OF ALL RESPONSES TO PHQ QUESTIONS 1-9: 0
1. LITTLE INTEREST OR PLEASURE IN DOING THINGS: 0

## 2023-04-19 ASSESSMENT — ENCOUNTER SYMPTOMS
VOICE CHANGE: 0
RHINORRHEA: 0
NAUSEA: 0
BACK PAIN: 0
SHORTNESS OF BREATH: 0
PHOTOPHOBIA: 0
COUGH: 1
COLOR CHANGE: 0
VOMITING: 0

## 2023-05-19 DIAGNOSIS — E78.2 MIXED HYPERLIPIDEMIA: ICD-10-CM

## 2023-05-19 DIAGNOSIS — I10 ESSENTIAL HYPERTENSION: ICD-10-CM

## 2023-05-19 DIAGNOSIS — E11.9 TYPE 2 DIABETES MELLITUS WITHOUT COMPLICATION, WITHOUT LONG-TERM CURRENT USE OF INSULIN (HCC): ICD-10-CM

## 2023-05-19 LAB
25(OH)D3 SERPL-MCNC: 49 NG/ML
ALBUMIN SERPL-MCNC: 4.2 G/DL (ref 3.5–5.2)
ALP SERPL-CCNC: 46 U/L (ref 35–104)
ALT SERPL-CCNC: 33 U/L (ref 5–33)
ANION GAP SERPL CALCULATED.3IONS-SCNC: 11 MMOL/L (ref 7–19)
AST SERPL-CCNC: 37 U/L (ref 5–32)
BACTERIA URNS QL MICRO: NEGATIVE /HPF
BASOPHILS # BLD: 0.1 K/UL (ref 0–0.2)
BASOPHILS NFR BLD: 0.7 % (ref 0–1)
BILIRUB SERPL-MCNC: 0.6 MG/DL (ref 0.2–1.2)
BILIRUB UR QL STRIP: NEGATIVE
BUN SERPL-MCNC: 10 MG/DL (ref 8–23)
CALCIUM SERPL-MCNC: 10.1 MG/DL (ref 8.8–10.2)
CHLORIDE SERPL-SCNC: 104 MMOL/L (ref 98–111)
CHOLEST SERPL-MCNC: 153 MG/DL (ref 160–199)
CLARITY UR: CLEAR
CO2 SERPL-SCNC: 28 MMOL/L (ref 22–29)
COLOR UR: YELLOW
CREAT SERPL-MCNC: 0.5 MG/DL (ref 0.5–0.9)
CREAT UR-MCNC: 29.2 MG/DL (ref 4.2–622)
CRYSTALS URNS MICRO: ABNORMAL /HPF
EOSINOPHIL # BLD: 0.1 K/UL (ref 0–0.6)
EOSINOPHIL NFR BLD: 2 % (ref 0–5)
EPI CELLS #/AREA URNS AUTO: 2 /HPF (ref 0–5)
ERYTHROCYTE [DISTWIDTH] IN BLOOD BY AUTOMATED COUNT: 12.6 % (ref 11.5–14.5)
GLUCOSE SERPL-MCNC: 134 MG/DL (ref 74–109)
GLUCOSE UR STRIP.AUTO-MCNC: NEGATIVE MG/DL
HBA1C MFR BLD: 8.1 % (ref 4–6)
HCT VFR BLD AUTO: 42.1 % (ref 37–47)
HDLC SERPL-MCNC: 67 MG/DL (ref 65–121)
HGB BLD-MCNC: 13.4 G/DL (ref 12–16)
HGB UR STRIP.AUTO-MCNC: NEGATIVE MG/L
HYALINE CASTS #/AREA URNS AUTO: 1 /HPF (ref 0–8)
IMM GRANULOCYTES # BLD: 0 K/UL
KETONES UR STRIP.AUTO-MCNC: NEGATIVE MG/DL
LDLC SERPL CALC-MCNC: 68 MG/DL
LEUKOCYTE ESTERASE UR QL STRIP.AUTO: ABNORMAL
LYMPHOCYTES # BLD: 2.4 K/UL (ref 1.1–4.5)
LYMPHOCYTES NFR BLD: 33.6 % (ref 20–40)
MCH RBC QN AUTO: 31.2 PG (ref 27–31)
MCHC RBC AUTO-ENTMCNC: 31.8 G/DL (ref 33–37)
MCV RBC AUTO: 98.1 FL (ref 81–99)
MICROALBUMIN UR-MCNC: <1.2 MG/DL (ref 0–19)
MICROALBUMIN/CREAT UR-RTO: NORMAL MG/G
MONOCYTES # BLD: 0.5 K/UL (ref 0–0.9)
MONOCYTES NFR BLD: 6.7 % (ref 0–10)
NEUTROPHILS # BLD: 4.1 K/UL (ref 1.5–7.5)
NEUTS SEG NFR BLD: 56.9 % (ref 50–65)
NITRITE UR QL STRIP.AUTO: NEGATIVE
PH UR STRIP.AUTO: 7 [PH] (ref 5–8)
PLATELET # BLD AUTO: 262 K/UL (ref 130–400)
PMV BLD AUTO: 10.5 FL (ref 9.4–12.3)
POTASSIUM SERPL-SCNC: 4 MMOL/L (ref 3.5–5)
PROT SERPL-MCNC: 7 G/DL (ref 6.6–8.7)
PROT UR STRIP.AUTO-MCNC: NEGATIVE MG/DL
RBC # BLD AUTO: 4.29 M/UL (ref 4.2–5.4)
RBC #/AREA URNS AUTO: 1 /HPF (ref 0–4)
SODIUM SERPL-SCNC: 143 MMOL/L (ref 136–145)
SP GR UR STRIP.AUTO: 1.01 (ref 1–1.03)
TRIGL SERPL-MCNC: 88 MG/DL (ref 0–149)
TSH SERPL DL<=0.005 MIU/L-ACNC: 1.42 UIU/ML (ref 0.27–4.2)
UROBILINOGEN UR STRIP.AUTO-MCNC: 0.2 E.U./DL
WBC # BLD AUTO: 7.2 K/UL (ref 4.8–10.8)
WBC #/AREA URNS AUTO: 2 /HPF (ref 0–5)

## 2023-05-23 ENCOUNTER — OFFICE VISIT (OUTPATIENT)
Dept: PRIMARY CARE CLINIC | Age: 63
End: 2023-05-23
Payer: MEDICARE

## 2023-05-23 VITALS
HEART RATE: 87 BPM | WEIGHT: 139 LBS | DIASTOLIC BLOOD PRESSURE: 76 MMHG | SYSTOLIC BLOOD PRESSURE: 130 MMHG | TEMPERATURE: 98.7 F | BODY MASS INDEX: 24.63 KG/M2 | OXYGEN SATURATION: 97 % | HEIGHT: 63 IN

## 2023-05-23 DIAGNOSIS — Z00.00 WELCOME TO MEDICARE PREVENTIVE VISIT: Primary | ICD-10-CM

## 2023-05-23 PROCEDURE — 3017F COLORECTAL CA SCREEN DOC REV: CPT | Performed by: NURSE PRACTITIONER

## 2023-05-23 PROCEDURE — G0402 INITIAL PREVENTIVE EXAM: HCPCS | Performed by: NURSE PRACTITIONER

## 2023-05-23 PROCEDURE — 3074F SYST BP LT 130 MM HG: CPT | Performed by: NURSE PRACTITIONER

## 2023-05-23 PROCEDURE — 3078F DIAST BP <80 MM HG: CPT | Performed by: NURSE PRACTITIONER

## 2023-05-23 PROCEDURE — 1123F ACP DISCUSS/DSCN MKR DOCD: CPT | Performed by: NURSE PRACTITIONER

## 2023-05-23 ASSESSMENT — PATIENT HEALTH QUESTIONNAIRE - PHQ9
3. TROUBLE FALLING OR STAYING ASLEEP: 0
1. LITTLE INTEREST OR PLEASURE IN DOING THINGS: 3
9. THOUGHTS THAT YOU WOULD BE BETTER OFF DEAD, OR OF HURTING YOURSELF: 0
SUM OF ALL RESPONSES TO PHQ QUESTIONS 1-9: 3
5. POOR APPETITE OR OVEREATING: 0
8. MOVING OR SPEAKING SO SLOWLY THAT OTHER PEOPLE COULD HAVE NOTICED. OR THE OPPOSITE, BEING SO FIGETY OR RESTLESS THAT YOU HAVE BEEN MOVING AROUND A LOT MORE THAN USUAL: 0
2. FEELING DOWN, DEPRESSED OR HOPELESS: 0
SUM OF ALL RESPONSES TO PHQ QUESTIONS 1-9: 3
SUM OF ALL RESPONSES TO PHQ QUESTIONS 1-9: 3
7. TROUBLE CONCENTRATING ON THINGS, SUCH AS READING THE NEWSPAPER OR WATCHING TELEVISION: 0
10. IF YOU CHECKED OFF ANY PROBLEMS, HOW DIFFICULT HAVE THESE PROBLEMS MADE IT FOR YOU TO DO YOUR WORK, TAKE CARE OF THINGS AT HOME, OR GET ALONG WITH OTHER PEOPLE: 0
6. FEELING BAD ABOUT YOURSELF - OR THAT YOU ARE A FAILURE OR HAVE LET YOURSELF OR YOUR FAMILY DOWN: 0
SUM OF ALL RESPONSES TO PHQ9 QUESTIONS 1 & 2: 3
SUM OF ALL RESPONSES TO PHQ QUESTIONS 1-9: 3
4. FEELING TIRED OR HAVING LITTLE ENERGY: 0

## 2023-05-23 ASSESSMENT — LIFESTYLE VARIABLES
HOW OFTEN DO YOU HAVE A DRINK CONTAINING ALCOHOL: NEVER
HOW MANY STANDARD DRINKS CONTAINING ALCOHOL DO YOU HAVE ON A TYPICAL DAY: PATIENT DOES NOT DRINK

## 2023-05-23 NOTE — PROGRESS NOTES
Medicare Annual Wellness Visit    Elisabeth Aguillon is here for Medicare AWV (No new issues or concerns)    Assessment & Plan   Welcome to Medicare preventive visit    Recommendations for Preventive Services Due: see orders and patient instructions/AVS.  Recommended screening schedule for the next 5-10 years is provided to the patient in written form: see Patient Instructions/AVS.     Return in about 3 months (around 8/23/2023) for in office/A1C. Subjective   The following acute and/or chronic problems were also addressed today:  None    Patient's complete Health Risk Assessment and screening values have been reviewed and are found in Flowsheets. The following problems were reviewed today and where indicated follow up appointments were made and/or referrals ordered. Positive Risk Factor Screenings with Interventions:                         LDCT Screening: Discussed with patient the benefits and harms of screening, follow-up diagnostic testing, over-diagnosis, false positive rate, and total radiation exposure. Counseled on the importance of adherence to annual lung cancer LDCT screening, impact of comorbidities, ability and willingness to undergo diagnosis and treatment. Counseled on the importance of maintaining cigarette smoking abstinence and cessation. The patient has a history of >20 pack years and is either still smoking or quit within the last 15 years. There are no signs or symptoms of lung cancer. Objective   Vitals:    05/23/23 1110   BP: 130/76   Pulse: 87   Temp: 98.7 °F (37.1 °C)   TempSrc: Temporal   SpO2: 97%   Weight: 139 lb (63 kg)   Height: 5' 3\" (1.6 m)      Body mass index is 24.62 kg/m².       General Appearance: alert and oriented to person, place and time, well developed and well- nourished, in no acute distress  Skin: warm and dry, no rash or erythema  Head: normocephalic and atraumatic  Eyes: pupils equal, round, and reactive to light, extraocular eye movements intact,

## 2023-06-20 ENCOUNTER — HOSPITAL ENCOUNTER (OUTPATIENT)
Dept: INFUSION THERAPY | Age: 63
Discharge: HOME | End: 2023-06-20
Payer: MEDICARE

## 2023-06-20 ENCOUNTER — OFFICE VISIT (OUTPATIENT)
Dept: HEMATOLOGY | Age: 63
End: 2023-06-20
Payer: MEDICARE

## 2023-06-20 VITALS
HEART RATE: 81 BPM | DIASTOLIC BLOOD PRESSURE: 76 MMHG | SYSTOLIC BLOOD PRESSURE: 136 MMHG | WEIGHT: 132 LBS | BODY MASS INDEX: 23.38 KG/M2 | OXYGEN SATURATION: 97 %

## 2023-06-20 DIAGNOSIS — M81.0 OSTEOPOROSIS, UNSPECIFIED OSTEOPOROSIS TYPE, UNSPECIFIED PATHOLOGICAL FRACTURE PRESENCE: ICD-10-CM

## 2023-06-20 DIAGNOSIS — Z17.0 MALIGNANT NEOPLASM OF OVERLAPPING SITES OF LEFT BREAST IN FEMALE, ESTROGEN RECEPTOR POSITIVE (HCC): Primary | ICD-10-CM

## 2023-06-20 DIAGNOSIS — M81.0 OSTEOPOROSIS WITHOUT CURRENT PATHOLOGICAL FRACTURE, UNSPECIFIED OSTEOPOROSIS TYPE: ICD-10-CM

## 2023-06-20 DIAGNOSIS — Z79.810 ENCOUNTER FOR MONITORING TAMOXIFEN THERAPY: ICD-10-CM

## 2023-06-20 DIAGNOSIS — C50.812 MALIGNANT NEOPLASM OF OVERLAPPING SITES OF LEFT BREAST IN FEMALE, ESTROGEN RECEPTOR POSITIVE (HCC): Primary | ICD-10-CM

## 2023-06-20 DIAGNOSIS — Z51.81 ENCOUNTER FOR MONITORING BISPHOSPHONATE THERAPY: ICD-10-CM

## 2023-06-20 DIAGNOSIS — Z51.81 ENCOUNTER FOR MONITORING TAMOXIFEN THERAPY: ICD-10-CM

## 2023-06-20 DIAGNOSIS — Z79.83 ENCOUNTER FOR MONITORING BISPHOSPHONATE THERAPY: ICD-10-CM

## 2023-06-20 LAB
BASOPHILS # BLD: 0.05 K/UL (ref 0.01–0.08)
BASOPHILS NFR BLD: 0.4 % (ref 0.1–1.2)
EOSINOPHIL # BLD: 0.18 K/UL (ref 0.04–0.54)
EOSINOPHIL NFR BLD: 1.5 % (ref 0.7–7)
ERYTHROCYTE [DISTWIDTH] IN BLOOD BY AUTOMATED COUNT: 12.2 % (ref 11.7–14.4)
HCT VFR BLD AUTO: 42.5 % (ref 34.1–44.9)
HGB BLD-MCNC: 13.2 G/DL (ref 11.2–15.7)
LYMPHOCYTES # BLD: 2.82 K/UL (ref 1.18–3.74)
LYMPHOCYTES NFR BLD: 24.1 % (ref 19.3–53.1)
MCH RBC QN AUTO: 31.2 PG (ref 25.6–32.2)
MCHC RBC AUTO-ENTMCNC: 31.1 G/DL (ref 32.3–35.5)
MCV RBC AUTO: 100.5 FL (ref 79.4–94.8)
MONOCYTES # BLD: 0.76 K/UL (ref 0.24–0.82)
MONOCYTES NFR BLD: 6.5 % (ref 4.7–12.5)
NEUTROPHILS # BLD: 7.86 K/UL (ref 1.56–6.13)
NEUTS SEG NFR BLD: 67.3 % (ref 34–71.1)
PLATELET # BLD AUTO: 166 K/UL (ref 182–369)
PMV BLD AUTO: 10.7 FL (ref 7.4–10.4)
RBC # BLD AUTO: 4.23 M/UL (ref 3.93–5.22)
WBC # BLD AUTO: 11.69 K/UL (ref 3.98–10.04)

## 2023-06-20 PROCEDURE — 3078F DIAST BP <80 MM HG: CPT | Performed by: NURSE PRACTITIONER

## 2023-06-20 PROCEDURE — 99214 OFFICE O/P EST MOD 30 MIN: CPT | Performed by: NURSE PRACTITIONER

## 2023-06-20 PROCEDURE — 1123F ACP DISCUSS/DSCN MKR DOCD: CPT | Performed by: NURSE PRACTITIONER

## 2023-06-20 PROCEDURE — 3017F COLORECTAL CA SCREEN DOC REV: CPT | Performed by: NURSE PRACTITIONER

## 2023-06-20 PROCEDURE — 96372 THER/PROPH/DIAG INJ SC/IM: CPT

## 2023-06-20 PROCEDURE — G8427 DOCREV CUR MEDS BY ELIG CLIN: HCPCS | Performed by: NURSE PRACTITIONER

## 2023-06-20 PROCEDURE — 1036F TOBACCO NON-USER: CPT | Performed by: NURSE PRACTITIONER

## 2023-06-20 PROCEDURE — 99211 OFF/OP EST MAY X REQ PHY/QHP: CPT

## 2023-06-20 PROCEDURE — G8420 CALC BMI NORM PARAMETERS: HCPCS | Performed by: NURSE PRACTITIONER

## 2023-06-20 PROCEDURE — 85025 COMPLETE CBC W/AUTO DIFF WBC: CPT

## 2023-06-20 PROCEDURE — 3074F SYST BP LT 130 MM HG: CPT | Performed by: NURSE PRACTITIONER

## 2023-06-20 PROCEDURE — 6360000002 HC RX W HCPCS: Performed by: NURSE PRACTITIONER

## 2023-06-20 PROCEDURE — G8399 PT W/DXA RESULTS DOCUMENT: HCPCS | Performed by: NURSE PRACTITIONER

## 2023-06-20 PROCEDURE — 1090F PRES/ABSN URINE INCON ASSESS: CPT | Performed by: NURSE PRACTITIONER

## 2023-06-20 PROCEDURE — 36415 COLL VENOUS BLD VENIPUNCTURE: CPT

## 2023-06-20 RX ORDER — SEMAGLUTIDE 1.34 MG/ML
INJECTION, SOLUTION SUBCUTANEOUS
COMMUNITY
Start: 2023-05-23

## 2023-06-20 RX ORDER — EPINEPHRINE 1 MG/ML
0.3 INJECTION, SOLUTION, CONCENTRATE INTRAVENOUS PRN
OUTPATIENT
Start: 2023-12-19

## 2023-06-20 RX ORDER — FAMOTIDINE 10 MG/ML
20 INJECTION, SOLUTION INTRAVENOUS
OUTPATIENT
Start: 2023-12-19

## 2023-06-20 RX ORDER — DIPHENHYDRAMINE HYDROCHLORIDE 50 MG/ML
50 INJECTION INTRAMUSCULAR; INTRAVENOUS
OUTPATIENT
Start: 2023-12-19

## 2023-06-20 RX ORDER — ALBUTEROL SULFATE 90 UG/1
4 AEROSOL, METERED RESPIRATORY (INHALATION) PRN
OUTPATIENT
Start: 2023-12-19

## 2023-06-20 RX ORDER — SODIUM CHLORIDE 9 MG/ML
INJECTION, SOLUTION INTRAVENOUS CONTINUOUS
OUTPATIENT
Start: 2023-12-19

## 2023-06-20 RX ORDER — ONDANSETRON 2 MG/ML
8 INJECTION INTRAMUSCULAR; INTRAVENOUS
OUTPATIENT
Start: 2023-12-19

## 2023-06-20 RX ORDER — ACETAMINOPHEN 325 MG/1
650 TABLET ORAL
OUTPATIENT
Start: 2023-12-19

## 2023-06-20 RX ADMIN — DENOSUMAB 60 MG: 60 INJECTION SUBCUTANEOUS at 10:21

## 2023-06-26 ASSESSMENT — ENCOUNTER SYMPTOMS
DIARRHEA: 0
EYE PAIN: 0
NAUSEA: 0
BACK PAIN: 0
VOMITING: 0
GASTROINTESTINAL NEGATIVE: 1
EYE REDNESS: 0
BLOOD IN STOOL: 0
RESPIRATORY NEGATIVE: 1
SHORTNESS OF BREATH: 0
SORE THROAT: 0
ABDOMINAL PAIN: 0
EYES NEGATIVE: 1
CONSTIPATION: 0
WHEEZING: 0
EYE DISCHARGE: 0
COUGH: 0

## 2023-07-05 NOTE — PROGRESS NOTES
HISTORY OF PRESENT ILLNESS:    Ms. Suzette Sosa presents today for a 6-month breast exam. This is following left lumpectomy and IORT on 9/28/2018    An ultrasound guided breast biopsy  on the left which revealed a 1.1  cm low grade  invasive mammary carcinoma. ER is positive at 99%. [place] is positive at 21%. Her-2 is negative by IHC. Ki67 is 7% and low    MammaPrint demonstrated a low risk luminal-A phenotype    MRI 8/20/2018     Impression   1. Postbiopsy changes in the left breast are consistent with the   biopsy-proven invasive breast cancer. 2. No additional sites of suspicious enhancement are identified in   bilateral breasts. 3. No MRI evidence of lymphadenopathy       PATHOLOGY REVEALS:  FINAL DIAGNOSIS:       A. 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 (pT1b, pNX). Tumor measures 0.9cm in greatest dimension. Tumor is 0.2cm from closest inked margin (anterior). Margins of excision are negative. B. Left breast, additional margin cranial, excision:       Benign breast parenchyma. Negative for malignancy. C. Left breast, additional caudal deep margin, excision:       Benign breast parenchyma. Negative for malignancy. She underwent sentinel node biopsy on 71/5/8825 without complications. She now comes in for follow-up. FINAL DIAGNOSIS:    Lymph nodes, left axilla, sentinel lymph node biopsy: Five lymph nodes  negative for metastatic carcinoma. She has no new complaints today. She denies weight loss or any other systemic symptoms. She has recently been diagnosed with osteoporosis and is concerned about continuing to take the Femara. She is due to get her first dose of Prolia next week with an additional dose in 6 months. I discussed her with Dr. Heather Patricio and switched her from Femara to tamoxifen. The prescription has been sent to her pharmacy.     Mammogram-12/15/2022

## 2023-07-06 ENCOUNTER — OFFICE VISIT (OUTPATIENT)
Dept: SURGERY | Age: 63
End: 2023-07-06
Payer: MEDICARE

## 2023-07-06 VITALS — SYSTOLIC BLOOD PRESSURE: 128 MMHG | DIASTOLIC BLOOD PRESSURE: 74 MMHG

## 2023-07-06 DIAGNOSIS — Z17.0 MALIGNANT NEOPLASM OF OVERLAPPING SITES OF LEFT BREAST IN FEMALE, ESTROGEN RECEPTOR POSITIVE (HCC): Primary | ICD-10-CM

## 2023-07-06 DIAGNOSIS — C50.812 MALIGNANT NEOPLASM OF OVERLAPPING SITES OF LEFT BREAST IN FEMALE, ESTROGEN RECEPTOR POSITIVE (HCC): Primary | ICD-10-CM

## 2023-07-06 DIAGNOSIS — Z98.890 STATUS POST LEFT BREAST LUMPECTOMY: ICD-10-CM

## 2023-07-06 PROCEDURE — 3078F DIAST BP <80 MM HG: CPT | Performed by: SURGERY

## 2023-07-06 PROCEDURE — 1036F TOBACCO NON-USER: CPT | Performed by: SURGERY

## 2023-07-06 PROCEDURE — 1123F ACP DISCUSS/DSCN MKR DOCD: CPT | Performed by: SURGERY

## 2023-07-06 PROCEDURE — G8420 CALC BMI NORM PARAMETERS: HCPCS | Performed by: SURGERY

## 2023-07-06 PROCEDURE — G8399 PT W/DXA RESULTS DOCUMENT: HCPCS | Performed by: SURGERY

## 2023-07-06 PROCEDURE — G8427 DOCREV CUR MEDS BY ELIG CLIN: HCPCS | Performed by: SURGERY

## 2023-07-06 PROCEDURE — 1090F PRES/ABSN URINE INCON ASSESS: CPT | Performed by: SURGERY

## 2023-07-06 PROCEDURE — 3074F SYST BP LT 130 MM HG: CPT | Performed by: SURGERY

## 2023-07-06 PROCEDURE — 3017F COLORECTAL CA SCREEN DOC REV: CPT | Performed by: SURGERY

## 2023-07-06 PROCEDURE — 99214 OFFICE O/P EST MOD 30 MIN: CPT | Performed by: SURGERY

## 2023-08-23 ENCOUNTER — OFFICE VISIT (OUTPATIENT)
Dept: PRIMARY CARE CLINIC | Age: 63
End: 2023-08-23
Payer: MEDICARE

## 2023-08-23 VITALS
SYSTOLIC BLOOD PRESSURE: 130 MMHG | OXYGEN SATURATION: 98 % | WEIGHT: 127.4 LBS | DIASTOLIC BLOOD PRESSURE: 80 MMHG | HEART RATE: 84 BPM | TEMPERATURE: 97.1 F | BODY MASS INDEX: 22.57 KG/M2 | HEIGHT: 63 IN

## 2023-08-23 DIAGNOSIS — L23.7 CONTACT DERMATITIS DUE TO POISON IVY: ICD-10-CM

## 2023-08-23 DIAGNOSIS — E11.9 TYPE 2 DIABETES MELLITUS WITHOUT COMPLICATION, WITHOUT LONG-TERM CURRENT USE OF INSULIN (HCC): Primary | ICD-10-CM

## 2023-08-23 LAB — HBA1C MFR BLD: 6.6 %

## 2023-08-23 PROCEDURE — 3075F SYST BP GE 130 - 139MM HG: CPT | Performed by: NURSE PRACTITIONER

## 2023-08-23 PROCEDURE — 3079F DIAST BP 80-89 MM HG: CPT | Performed by: NURSE PRACTITIONER

## 2023-08-23 PROCEDURE — 3044F HG A1C LEVEL LT 7.0%: CPT | Performed by: NURSE PRACTITIONER

## 2023-08-23 PROCEDURE — 1036F TOBACCO NON-USER: CPT | Performed by: NURSE PRACTITIONER

## 2023-08-23 PROCEDURE — 1123F ACP DISCUSS/DSCN MKR DOCD: CPT | Performed by: NURSE PRACTITIONER

## 2023-08-23 PROCEDURE — 3017F COLORECTAL CA SCREEN DOC REV: CPT | Performed by: NURSE PRACTITIONER

## 2023-08-23 PROCEDURE — 99214 OFFICE O/P EST MOD 30 MIN: CPT | Performed by: NURSE PRACTITIONER

## 2023-08-23 PROCEDURE — G8427 DOCREV CUR MEDS BY ELIG CLIN: HCPCS | Performed by: NURSE PRACTITIONER

## 2023-08-23 PROCEDURE — 1090F PRES/ABSN URINE INCON ASSESS: CPT | Performed by: NURSE PRACTITIONER

## 2023-08-23 PROCEDURE — 2022F DILAT RTA XM EVC RTNOPTHY: CPT | Performed by: NURSE PRACTITIONER

## 2023-08-23 PROCEDURE — G8399 PT W/DXA RESULTS DOCUMENT: HCPCS | Performed by: NURSE PRACTITIONER

## 2023-08-23 PROCEDURE — G8420 CALC BMI NORM PARAMETERS: HCPCS | Performed by: NURSE PRACTITIONER

## 2023-08-23 PROCEDURE — 96372 THER/PROPH/DIAG INJ SC/IM: CPT | Performed by: NURSE PRACTITIONER

## 2023-08-23 RX ORDER — METHYLPREDNISOLONE 4 MG/1
TABLET ORAL
Qty: 1 KIT | Refills: 0 | Status: SHIPPED | OUTPATIENT
Start: 2023-08-23 | End: 2023-08-29

## 2023-08-23 RX ORDER — DEXAMETHASONE SODIUM PHOSPHATE 10 MG/ML
10 INJECTION INTRAMUSCULAR; INTRAVENOUS ONCE
Status: COMPLETED | OUTPATIENT
Start: 2023-08-23 | End: 2023-08-23

## 2023-08-23 RX ADMIN — DEXAMETHASONE SODIUM PHOSPHATE 10 MG: 10 INJECTION INTRAMUSCULAR; INTRAVENOUS at 11:46

## 2023-08-23 ASSESSMENT — ENCOUNTER SYMPTOMS
SHORTNESS OF BREATH: 0
CHEST TIGHTNESS: 0
COLOR CHANGE: 0
VOMITING: 0
ABDOMINAL PAIN: 0
SORE THROAT: 0
NAUSEA: 0
COUGH: 0
DIARRHEA: 0

## 2023-08-23 NOTE — PROGRESS NOTES
2023     Steph Lee (:  1960) is a 70 y.o. female,Established patient, here for evaluation of the following chief complaint(s):  3 Month Follow-Up      ASSESSMENT/PLAN:  1. Type 2 diabetes mellitus without complication, without long-term current use of insulin ([address])  Assessment & Plan:   Patient here today to follow up on diabetes. She states she has been compliant with taking her prescribed Metformin and reduced dosage of 0.5mg of Ozempic. Her hemoglobin A1c is 6.6 today reduced from 8.1, three months ago. She denies any associated concerns or adverse side effects. Plans to continue current regimen and follow up in 3 months. Orders:  -     POCT glycosylated hemoglobin (Hb A1C)  2. Contact dermatitis due to poison ivy  Assessment & Plan:   Patient presents with linear, vesicular rash on left arm and upper thigh. She reports rash is likely due to being outdoors and probable exposure to poison ivy. Will treat today with a steroid injection and also send in oral steroids to start tomorrow. Orders:  -     dexamethasone (DECADRON) injection 10 mg; 10 mg, IntraMUSCular, ONCE, 1 dose, On 23 at 1200  -     methylPREDNISolone (MEDROL DOSEPACK) 4 MG tablet; Take by mouth., Disp-1 kit, R-0Normal  -     triamcinolone (KENALOG) 0.1 % ointment; Apply topically 2 times daily. , Disp-15 g, R-0, Normal      Return in about 3 months (around 2023) for diabetes. SUBJECTIVE/OBJECTIVE:  HPI    Prior to Visit Medications    Medication Sig Taking? Authorizing Provider   methylPREDNISolone (MEDROL DOSEPACK) 4 MG tablet Take by mouth. Yes JAVAN Tran CNP   triamcinolone (KENALOG) 0.1 % ointment Apply topically 2 times daily.  Yes JAVAN Tran CNP   OZEMPIC, 1 MG/DOSE, 4 MG/3ML SOPN  Yes Historical Provider, MD   albuterol sulfate HFA (VENTOLIN HFA) 108 (90 Base) MCG/ACT inhaler Inhale 2 puffs into the lungs 4 times daily as needed for Wheezing Yes JAVAN Workman

## 2023-08-23 NOTE — ASSESSMENT & PLAN NOTE
Patient here today to follow up on diabetes. She states she has been compliant with taking her prescribed Metformin and reduced dosage of 0.5mg of Ozempic. Her hemoglobin A1c is 6.6 today reduced from 8.1, three months ago. She denies any associated concerns or adverse side effects. Plans to continue current regimen and follow up in 3 months.

## 2023-08-23 NOTE — PROGRESS NOTES
After obtaining consent, and per orders of Leigh EDOUARD , injection of dexamethasone 10mg  given in left ventrogluteal  by Ramses Bartlett MA. Patient instructed to remain in clinic for 20 minutes afterwards, and to report any adverse reaction to me immediately.

## 2023-08-23 NOTE — ASSESSMENT & PLAN NOTE
Patient presents with linear, vesicular rash on left arm and upper thigh. She reports rash is likely due to being outdoors and probable exposure to poison ivy. Will treat today with a steroid injection and also send in oral steroids to start tomorrow.

## 2023-11-27 ENCOUNTER — OFFICE VISIT (OUTPATIENT)
Dept: PRIMARY CARE CLINIC | Age: 63
End: 2023-11-27
Payer: MEDICARE

## 2023-11-27 VITALS
WEIGHT: 129 LBS | OXYGEN SATURATION: 100 % | HEART RATE: 100 BPM | BODY MASS INDEX: 22.86 KG/M2 | SYSTOLIC BLOOD PRESSURE: 110 MMHG | HEIGHT: 63 IN | TEMPERATURE: 96.9 F | DIASTOLIC BLOOD PRESSURE: 74 MMHG

## 2023-11-27 DIAGNOSIS — E11.9 TYPE 2 DIABETES MELLITUS WITHOUT COMPLICATION, WITHOUT LONG-TERM CURRENT USE OF INSULIN (HCC): Primary | ICD-10-CM

## 2023-11-27 DIAGNOSIS — E78.2 MIXED HYPERLIPIDEMIA: ICD-10-CM

## 2023-11-27 DIAGNOSIS — I10 ESSENTIAL HYPERTENSION: ICD-10-CM

## 2023-11-27 DIAGNOSIS — Z12.11 COLON CANCER SCREENING: ICD-10-CM

## 2023-11-27 LAB — HBA1C MFR BLD: 6.7 %

## 2023-11-27 PROCEDURE — 3017F COLORECTAL CA SCREEN DOC REV: CPT | Performed by: NURSE PRACTITIONER

## 2023-11-27 PROCEDURE — 3044F HG A1C LEVEL LT 7.0%: CPT | Performed by: NURSE PRACTITIONER

## 2023-11-27 PROCEDURE — 1036F TOBACCO NON-USER: CPT | Performed by: NURSE PRACTITIONER

## 2023-11-27 PROCEDURE — G8427 DOCREV CUR MEDS BY ELIG CLIN: HCPCS | Performed by: NURSE PRACTITIONER

## 2023-11-27 PROCEDURE — 99214 OFFICE O/P EST MOD 30 MIN: CPT | Performed by: NURSE PRACTITIONER

## 2023-11-27 PROCEDURE — G8484 FLU IMMUNIZE NO ADMIN: HCPCS | Performed by: NURSE PRACTITIONER

## 2023-11-27 PROCEDURE — 1123F ACP DISCUSS/DSCN MKR DOCD: CPT | Performed by: NURSE PRACTITIONER

## 2023-11-27 PROCEDURE — 3074F SYST BP LT 130 MM HG: CPT | Performed by: NURSE PRACTITIONER

## 2023-11-27 PROCEDURE — 1090F PRES/ABSN URINE INCON ASSESS: CPT | Performed by: NURSE PRACTITIONER

## 2023-11-27 PROCEDURE — G8399 PT W/DXA RESULTS DOCUMENT: HCPCS | Performed by: NURSE PRACTITIONER

## 2023-11-27 PROCEDURE — G8420 CALC BMI NORM PARAMETERS: HCPCS | Performed by: NURSE PRACTITIONER

## 2023-11-27 PROCEDURE — 2022F DILAT RTA XM EVC RTNOPTHY: CPT | Performed by: NURSE PRACTITIONER

## 2023-11-27 PROCEDURE — 3078F DIAST BP <80 MM HG: CPT | Performed by: NURSE PRACTITIONER

## 2023-11-27 RX ORDER — METFORMIN HYDROCHLORIDE 500 MG/1
500 TABLET, EXTENDED RELEASE ORAL
Qty: 90 TABLET | Refills: 2 | Status: SHIPPED | OUTPATIENT
Start: 2023-11-27

## 2023-11-27 RX ORDER — MONTELUKAST SODIUM 10 MG/1
TABLET ORAL
Qty: 90 TABLET | Refills: 2 | Status: SHIPPED | OUTPATIENT
Start: 2023-11-27

## 2023-11-27 RX ORDER — TAMOXIFEN CITRATE 20 MG/1
TABLET ORAL
Qty: 90 TABLET | Refills: 2 | Status: SHIPPED | OUTPATIENT
Start: 2023-11-27

## 2023-11-27 RX ORDER — SEMAGLUTIDE 1.34 MG/ML
1 INJECTION, SOLUTION SUBCUTANEOUS WEEKLY
Qty: 3 ML | Refills: 2 | Status: SHIPPED | OUTPATIENT
Start: 2023-11-27

## 2023-11-27 RX ORDER — IRBESARTAN 75 MG/1
TABLET ORAL
Qty: 135 TABLET | Refills: 2 | Status: SHIPPED | OUTPATIENT
Start: 2023-11-27

## 2023-11-27 RX ORDER — ATORVASTATIN CALCIUM 40 MG/1
TABLET, FILM COATED ORAL
Qty: 90 TABLET | Refills: 2 | Status: SHIPPED | OUTPATIENT
Start: 2023-11-27

## 2023-11-27 ASSESSMENT — ENCOUNTER SYMPTOMS
NAUSEA: 0
PHOTOPHOBIA: 0
BACK PAIN: 0
VOMITING: 0
COLOR CHANGE: 0
COUGH: 0
SHORTNESS OF BREATH: 0
VOICE CHANGE: 0
RHINORRHEA: 0

## 2023-11-27 NOTE — PATIENT INSTRUCTIONS
Cologuard- please complete within 1-2 weeks of receiving. Follow-up in 6 months with fasting labs prior to visit. Keep appointment for mammogram as scheduled.

## 2023-11-27 NOTE — PROGRESS NOTES
Instructed to continue currentmedications, diet and exercise. Patient agreed with treatment plan. Follow up as directed. MEDICATIONS:  Orders Placed This Encounter   Medications    OZEMPIC, 1 MG/DOSE, 4 MG/3ML SOPN     Sig: Inject 1 mg into the skin once a week     Dispense:  3 mL     Refill:  2    atorvastatin (LIPITOR) 40 MG tablet     Sig: TAKE ONE (1) TABLET BY MOUTH DAILY     Dispense:  90 tablet     Refill:  2    irbesartan (AVAPRO) 75 MG tablet     Sig: TAKE ONE-HALF TABLET BY MOUTH DAILY. GENERIC EQUIVALENT FOR AVAPRO     Dispense:  135 tablet     Refill:  2    metFORMIN (GLUCOPHAGE XR) 500 MG extended release tablet     Sig: Take 1 tablet by mouth daily (with breakfast)     Dispense:  90 tablet     Refill:  2    montelukast (SINGULAIR) 10 MG tablet     Sig: TAKE ONE (1) TABLET BY MOUTH DAILY     Dispense:  90 tablet     Refill:  2    tamoxifen (NOLVADEX) 20 MG tablet     Sig: TAKE ONE (1) TABLET BY MOUTH DAILY     Dispense:  90 tablet     Refill:  2         ORDERS:  Orders Placed This Encounter   Procedures    Fecal DNA Colorectal cancer screening (Cologuard)    CBC with Auto Differential    Comprehensive Metabolic Panel    Hemoglobin A1C    Lipid Panel    TSH    Microalbumin / Creatinine Urine Ratio    POCT glycosylated hemoglobin (Hb A1C)       Follow-up:  No follow-ups on file. PATIENT INSTRUCTIONS:  Patient Instructions   Cologuard- please complete within 1-2 weeks of receiving. Follow-up in 6 months with fasting labs prior to visit. Keep appointment for mammogram as scheduled. Electronically signed by JAVAN Fuentes on 11/27/2023 at 11:54 AM    EMR Dragon/transcription disclaimer:  Much of thisencounter note is electronic transcription/translation of spoken language to printed texts. The electronic translation of spoken language may be erroneous, or at times, nonsensical words or phrases may be inadvertentlytranscribed.   Although I have reviewed the note for such errors, some may

## 2023-12-04 ENCOUNTER — TELEPHONE (OUTPATIENT)
Dept: PRIMARY CARE CLINIC | Age: 63
End: 2023-12-04

## 2023-12-04 NOTE — TELEPHONE ENCOUNTER
----- Message from Maryann Reaves sent at 12/1/2023  9:55 AM CST -----  Subject: Message to Provider    QUESTIONS  Information for Provider? Douglas Putnamsandra Is [facility] pharmacy in January to   [facility] and you 've already made the switch needs it switched back   to [facility] until January 1. Please give her a call if you have any question. Will be ordering today.  ---------------------------------------------------------------------------  --------------  Saundra CARLISLE  [phone]; OK to leave message on voicemail  ---------------------------------------------------------------------------  --------------  SCRIPT ANSWERS  Relationship to Patient?  Self

## 2023-12-13 LAB — NONINV COLON CA DNA+OCC BLD SCRN STL QL: NEGATIVE

## 2023-12-27 ENCOUNTER — OFFICE VISIT (OUTPATIENT)
Dept: HEMATOLOGY | Age: 63
End: 2023-12-27
Payer: MEDICARE

## 2023-12-27 ENCOUNTER — HOSPITAL ENCOUNTER (OUTPATIENT)
Dept: INFUSION THERAPY | Age: 63
Discharge: HOME | End: 2023-12-27
Payer: MEDICARE

## 2023-12-27 VITALS
OXYGEN SATURATION: 99 % | BODY MASS INDEX: 23.55 KG/M2 | HEART RATE: 70 BPM | TEMPERATURE: 97.2 F | WEIGHT: 128 LBS | DIASTOLIC BLOOD PRESSURE: 60 MMHG | HEIGHT: 62 IN | SYSTOLIC BLOOD PRESSURE: 120 MMHG

## 2023-12-27 DIAGNOSIS — Z51.81 ENCOUNTER FOR MONITORING BISPHOSPHONATE THERAPY: ICD-10-CM

## 2023-12-27 DIAGNOSIS — Z17.0 MALIGNANT NEOPLASM OF OVERLAPPING SITES OF LEFT BREAST IN FEMALE, ESTROGEN RECEPTOR POSITIVE (HCC): ICD-10-CM

## 2023-12-27 DIAGNOSIS — C50.812 MALIGNANT NEOPLASM OF OVERLAPPING SITES OF LEFT BREAST IN FEMALE, ESTROGEN RECEPTOR POSITIVE (HCC): Primary | ICD-10-CM

## 2023-12-27 DIAGNOSIS — Z79.83 ENCOUNTER FOR MONITORING BISPHOSPHONATE THERAPY: ICD-10-CM

## 2023-12-27 DIAGNOSIS — C50.812 MALIGNANT NEOPLASM OF OVERLAPPING SITES OF LEFT BREAST IN FEMALE, ESTROGEN RECEPTOR POSITIVE (HCC): ICD-10-CM

## 2023-12-27 DIAGNOSIS — Z17.0 MALIGNANT NEOPLASM OF OVERLAPPING SITES OF LEFT BREAST IN FEMALE, ESTROGEN RECEPTOR POSITIVE (HCC): Primary | ICD-10-CM

## 2023-12-27 DIAGNOSIS — Z79.810 ENCOUNTER FOR MONITORING TAMOXIFEN THERAPY: ICD-10-CM

## 2023-12-27 DIAGNOSIS — M81.0 OSTEOPOROSIS WITHOUT CURRENT PATHOLOGICAL FRACTURE, UNSPECIFIED OSTEOPOROSIS TYPE: ICD-10-CM

## 2023-12-27 DIAGNOSIS — M81.0 OSTEOPOROSIS, UNSPECIFIED OSTEOPOROSIS TYPE, UNSPECIFIED PATHOLOGICAL FRACTURE PRESENCE: Primary | ICD-10-CM

## 2023-12-27 DIAGNOSIS — Z51.81 ENCOUNTER FOR MONITORING TAMOXIFEN THERAPY: ICD-10-CM

## 2023-12-27 LAB
ALBUMIN SERPL-MCNC: 3.9 G/DL (ref 3.5–5.2)
ALP SERPL-CCNC: 45 U/L (ref 35–104)
ALT SERPL-CCNC: 19 U/L (ref 9–52)
ANION GAP SERPL CALCULATED.3IONS-SCNC: 5 MMOL/L (ref 7–19)
AST SERPL-CCNC: 25 U/L (ref 14–36)
BASOPHILS # BLD: 0.02 K/UL (ref 0.01–0.08)
BASOPHILS NFR BLD: 0.4 % (ref 0.1–1.2)
BILIRUB SERPL-MCNC: 0.6 MG/DL (ref 0.2–1.3)
BUN SERPL-MCNC: 13 MG/DL (ref 7–17)
CALCIUM SERPL-MCNC: 9.5 MG/DL (ref 8.4–10.2)
CHLORIDE SERPL-SCNC: 104 MMOL/L (ref 98–111)
CO2 SERPL-SCNC: 31 MMOL/L (ref 22–29)
CREAT SERPL-MCNC: 0.6 MG/DL (ref 0.5–1)
EOSINOPHIL # BLD: 0.11 K/UL (ref 0.04–0.54)
EOSINOPHIL NFR BLD: 2 % (ref 0.7–7)
ERYTHROCYTE [DISTWIDTH] IN BLOOD BY AUTOMATED COUNT: 11.8 % (ref 11.7–14.4)
GLOBULIN: 3.1 G/DL
GLUCOSE SERPL-MCNC: 152 MG/DL (ref 74–106)
HCT VFR BLD AUTO: 37.5 % (ref 34.1–44.9)
HGB BLD-MCNC: 12.5 G/DL (ref 11.2–15.7)
LYMPHOCYTES # BLD: 2.29 K/UL (ref 1.18–3.74)
LYMPHOCYTES NFR BLD: 41.4 % (ref 19.3–53.1)
MCH RBC QN AUTO: 30.7 PG (ref 25.6–32.2)
MCHC RBC AUTO-ENTMCNC: 33.3 G/DL (ref 32.3–35.5)
MCV RBC AUTO: 92.1 FL (ref 79.4–94.8)
MONOCYTES # BLD: 0.37 K/UL (ref 0.24–0.82)
MONOCYTES NFR BLD: 6.7 % (ref 4.7–12.5)
NEUTROPHILS # BLD: 2.74 K/UL (ref 1.56–6.13)
NEUTS SEG NFR BLD: 49.5 % (ref 34–71.1)
PLATELET # BLD AUTO: 212 K/UL (ref 182–369)
PMV BLD AUTO: 9.5 FL (ref 7.4–10.4)
POTASSIUM SERPL-SCNC: 4.8 MMOL/L (ref 3.5–5.1)
PROT SERPL-MCNC: 7 G/DL (ref 6.3–8.2)
RBC # BLD AUTO: 4.07 M/UL (ref 3.93–5.22)
SODIUM SERPL-SCNC: 140 MMOL/L (ref 137–145)
WBC # BLD AUTO: 5.53 K/UL (ref 3.98–10.04)

## 2023-12-27 PROCEDURE — 3074F SYST BP LT 130 MM HG: CPT | Performed by: NURSE PRACTITIONER

## 2023-12-27 PROCEDURE — 99212 OFFICE O/P EST SF 10 MIN: CPT

## 2023-12-27 PROCEDURE — G8399 PT W/DXA RESULTS DOCUMENT: HCPCS | Performed by: NURSE PRACTITIONER

## 2023-12-27 PROCEDURE — 1036F TOBACCO NON-USER: CPT | Performed by: NURSE PRACTITIONER

## 2023-12-27 PROCEDURE — 1090F PRES/ABSN URINE INCON ASSESS: CPT | Performed by: NURSE PRACTITIONER

## 2023-12-27 PROCEDURE — 85025 COMPLETE CBC W/AUTO DIFF WBC: CPT

## 2023-12-27 PROCEDURE — 36415 COLL VENOUS BLD VENIPUNCTURE: CPT

## 2023-12-27 PROCEDURE — 1123F ACP DISCUSS/DSCN MKR DOCD: CPT | Performed by: NURSE PRACTITIONER

## 2023-12-27 PROCEDURE — 96372 THER/PROPH/DIAG INJ SC/IM: CPT

## 2023-12-27 PROCEDURE — G8420 CALC BMI NORM PARAMETERS: HCPCS | Performed by: NURSE PRACTITIONER

## 2023-12-27 PROCEDURE — 3017F COLORECTAL CA SCREEN DOC REV: CPT | Performed by: NURSE PRACTITIONER

## 2023-12-27 PROCEDURE — 6360000002 HC RX W HCPCS: Performed by: NURSE PRACTITIONER

## 2023-12-27 PROCEDURE — 99214 OFFICE O/P EST MOD 30 MIN: CPT | Performed by: NURSE PRACTITIONER

## 2023-12-27 PROCEDURE — 3078F DIAST BP <80 MM HG: CPT | Performed by: NURSE PRACTITIONER

## 2023-12-27 PROCEDURE — 80053 COMPREHEN METABOLIC PANEL: CPT

## 2023-12-27 PROCEDURE — G8484 FLU IMMUNIZE NO ADMIN: HCPCS | Performed by: NURSE PRACTITIONER

## 2023-12-27 PROCEDURE — G8427 DOCREV CUR MEDS BY ELIG CLIN: HCPCS | Performed by: NURSE PRACTITIONER

## 2023-12-27 RX ORDER — ACETAMINOPHEN 325 MG/1
650 TABLET ORAL
OUTPATIENT
Start: 2024-06-19

## 2023-12-27 RX ORDER — FAMOTIDINE 10 MG/ML
20 INJECTION, SOLUTION INTRAVENOUS
OUTPATIENT
Start: 2024-06-19

## 2023-12-27 RX ORDER — SODIUM CHLORIDE 9 MG/ML
INJECTION, SOLUTION INTRAVENOUS CONTINUOUS
OUTPATIENT
Start: 2024-06-19

## 2023-12-27 RX ORDER — ALBUTEROL SULFATE 90 UG/1
4 AEROSOL, METERED RESPIRATORY (INHALATION) PRN
OUTPATIENT
Start: 2024-06-19

## 2023-12-27 RX ORDER — EPINEPHRINE 1 MG/ML
0.3 INJECTION, SOLUTION, CONCENTRATE INTRAVENOUS PRN
OUTPATIENT
Start: 2024-06-19

## 2023-12-27 RX ORDER — ONDANSETRON 2 MG/ML
8 INJECTION INTRAMUSCULAR; INTRAVENOUS
OUTPATIENT
Start: 2024-06-19

## 2023-12-27 RX ORDER — DIPHENHYDRAMINE HYDROCHLORIDE 50 MG/ML
50 INJECTION INTRAMUSCULAR; INTRAVENOUS
OUTPATIENT
Start: 2024-06-19

## 2023-12-27 RX ADMIN — DENOSUMAB 60 MG: 60 INJECTION SUBCUTANEOUS at 11:04

## 2023-12-27 NOTE — PROGRESS NOTES
Value Date    WBC 5.53 12/27/2023    HGB 12.5 12/27/2023    HCT 37.5 12/27/2023    MCV 92.1 12/27/2023     12/27/2023     Lab Results   Component Value Date    NEUTROABS 2.74 12/27/2023     ASSESSMENT/PLAN:      1. Invasive ductal carcinoma of breast, left (HCC), status post lumpectomy/sentinel lymph node biopsy and IORT December 2018. Current recommendation is for adjuvant endocrine therapy with tamoxifen 20 mg p.o. daily for anticipated 10 years, through 8/20/2028.      Reports compliant with tamoxifen.  Has no new breast complaints  Declined breast exam due to recent mammogram on 12/19/2023 12/19/23 Bilateral Mammogram: BI-RADS 2:  Benign. Recommend screening digital mammogram in one year    -Continue tamoxifen 20 mg daily, in the setting of adjuvant therapy, no evidence of progression of disease, anticipate 10-year dosing anticipate completion date to be 8/20/2028.  Continue to decrease risk of recurrence of breast cancer  -Encourage self breast exams  -CBC today    2.  Osteoporosis: postmenopausal state and at increased risk for bone loss.  A slight improvement was noted on 07/12/2022 Bone density- osteoporosis; left femoral neck T score -2.3; lumbar spine T score -2.7  Vitamin D of 49.0 and Calcium of 10.1 on 05/19/2023  Last received Prolia on 6/20/2023  Reports compliant with calcium and vitamin D supplement as recommended    -Prolia 60 mg subcu every 6 months, next dose due today  -Continue calcium 1200 mg with vitamin D 1000 units daily  -CMP today    I discussed all of the above findings included in the assessment and plan with the patient and the patient is in agreement to move forward with current recommendations/treatment.  I have addressed all of their questions and concerns that were verbalized.    FOLLOW UP:  Follow-up appointment given for 6 months, sooner if needed breast cancer and osteoporosis  Continue to follow with other medical providers as recommended  Labs at next visit: CBC: CMP

## 2023-12-29 DIAGNOSIS — Z12.31 VISIT FOR SCREENING MAMMOGRAM: Primary | ICD-10-CM

## 2024-01-03 ASSESSMENT — ENCOUNTER SYMPTOMS
BLOOD IN STOOL: 0
GASTROINTESTINAL NEGATIVE: 1
RESPIRATORY NEGATIVE: 1
EYE DISCHARGE: 0
EYE REDNESS: 0
CONSTIPATION: 0
WHEEZING: 0
EYE PAIN: 0
SHORTNESS OF BREATH: 0
NAUSEA: 0
COUGH: 0
SORE THROAT: 0
DIARRHEA: 0
EYES NEGATIVE: 1
ABDOMINAL PAIN: 0
VOMITING: 0
BACK PAIN: 0

## 2024-01-29 RX ORDER — METFORMIN HYDROCHLORIDE 500 MG/1
500 TABLET, EXTENDED RELEASE ORAL
Qty: 30 TABLET | Refills: 0 | Status: SHIPPED | OUTPATIENT
Start: 2024-01-29

## 2024-01-29 NOTE — TELEPHONE ENCOUNTER
Mariola Oneil called to request a refill on her medication.    Patient is having trouble with her mail order delivery  Last office visit : 11/27/2023   Next office visit : 5/28/2024     Requested Prescriptions     Pending Prescriptions Disp Refills    metFORMIN (GLUCOPHAGE XR) 500 MG extended release tablet 30 tablet 0     Sig: Take 1 tablet by mouth daily (with breakfast)            Ling Johnson, LAILA

## 2024-02-19 NOTE — TELEPHONE ENCOUNTER
Mariola Oneil called to request a refill on her medication.      Last office visit : 11/27/2023   Next office visit : 5/28/2024     Requested Prescriptions     Pending Prescriptions Disp Refills    metFORMIN (GLUCOPHAGE XR) 500 MG extended release tablet 90 tablet 0     Sig: Take 1 tablet by mouth daily (with breakfast)            Janelle Bowles MA

## 2024-02-20 RX ORDER — METFORMIN HYDROCHLORIDE 500 MG/1
500 TABLET, EXTENDED RELEASE ORAL
Qty: 90 TABLET | Refills: 0 | Status: SHIPPED | OUTPATIENT
Start: 2024-02-20

## 2024-02-26 DIAGNOSIS — C50.812 MALIGNANT NEOPLASM OF OVERLAPPING SITES OF LEFT BREAST IN FEMALE, ESTROGEN RECEPTOR POSITIVE (HCC): Primary | ICD-10-CM

## 2024-02-26 DIAGNOSIS — E11.9 TYPE 2 DIABETES MELLITUS WITHOUT COMPLICATION, WITHOUT LONG-TERM CURRENT USE OF INSULIN (HCC): ICD-10-CM

## 2024-02-26 DIAGNOSIS — E78.2 MIXED HYPERLIPIDEMIA: ICD-10-CM

## 2024-02-26 DIAGNOSIS — I10 ESSENTIAL HYPERTENSION: ICD-10-CM

## 2024-02-26 DIAGNOSIS — E11.9 TYPE 2 DIABETES MELLITUS WITHOUT COMPLICATION, WITHOUT LONG-TERM CURRENT USE OF INSULIN (HCC): Primary | ICD-10-CM

## 2024-02-26 DIAGNOSIS — Z17.0 MALIGNANT NEOPLASM OF OVERLAPPING SITES OF LEFT BREAST IN FEMALE, ESTROGEN RECEPTOR POSITIVE (HCC): Primary | ICD-10-CM

## 2024-02-26 RX ORDER — ATORVASTATIN CALCIUM 40 MG/1
TABLET, FILM COATED ORAL
Qty: 90 TABLET | Refills: 0 | Status: SHIPPED | OUTPATIENT
Start: 2024-02-26

## 2024-02-26 RX ORDER — METFORMIN HYDROCHLORIDE 500 MG/1
500 TABLET, EXTENDED RELEASE ORAL
Qty: 30 TABLET | Refills: 0 | Status: SHIPPED | OUTPATIENT
Start: 2024-02-26 | End: 2024-02-26 | Stop reason: SDUPTHER

## 2024-02-26 RX ORDER — TAMOXIFEN CITRATE 20 MG/1
TABLET ORAL
Qty: 90 TABLET | Refills: 2 | Status: SHIPPED | OUTPATIENT
Start: 2024-02-26

## 2024-02-26 RX ORDER — MONTELUKAST SODIUM 10 MG/1
TABLET ORAL
Qty: 90 TABLET | Refills: 0 | Status: SHIPPED | OUTPATIENT
Start: 2024-02-26

## 2024-02-26 RX ORDER — IRBESARTAN 75 MG/1
TABLET ORAL
Qty: 135 TABLET | Refills: 2 | Status: SHIPPED | OUTPATIENT
Start: 2024-02-26

## 2024-02-26 RX ORDER — SEMAGLUTIDE 1.34 MG/ML
1 INJECTION, SOLUTION SUBCUTANEOUS WEEKLY
Qty: 3 ML | Refills: 3 | Status: SHIPPED | OUTPATIENT
Start: 2024-02-26

## 2024-02-26 RX ORDER — METFORMIN HYDROCHLORIDE 500 MG/1
500 TABLET, EXTENDED RELEASE ORAL
Qty: 90 TABLET | Refills: 0 | Status: SHIPPED | OUTPATIENT
Start: 2024-02-26

## 2024-02-26 NOTE — TELEPHONE ENCOUNTER
Mariola called requesting a refill of the below medication which has been pended for you:     Requested Prescriptions     Pending Prescriptions Disp Refills    atorvastatin (LIPITOR) 40 MG tablet 90 tablet 0     Sig: TAKE ONE (1) TABLET BY MOUTH DAILY    irbesartan (AVAPRO) 75 MG tablet 135 tablet 2     Sig: TAKE ONE-HALF TABLET BY MOUTH DAILY. GENERIC EQUIVALENT FOR AVAPRO    metFORMIN (GLUCOPHAGE XR) 500 MG extended release tablet 90 tablet 0     Sig: Take 1 tablet by mouth daily (with breakfast)    montelukast (SINGULAIR) 10 MG tablet 90 tablet 0     Sig: TAKE ONE (1) TABLET BY MOUTH DAILY    OZEMPIC, 1 MG/DOSE, 4 MG/3ML SOPN 3 mL 3     Sig: Inject 1 mg into the skin once a week    tamoxifen (NOLVADEX) 20 MG tablet 90 tablet 2     Sig: TAKE ONE (1) TABLET BY MOUTH DAILY       Last Appointment Date: 11/27/2023  Next Appointment Date: 5/28/2024    No Known Allergies

## 2024-02-26 NOTE — TELEPHONE ENCOUNTER
Mariola called requesting a refill of the below medication which has been pended for you:     Requested Prescriptions     Pending Prescriptions Disp Refills    metFORMIN (GLUCOPHAGE XR) 500 MG extended release tablet 30 tablet 0     Sig: Take 1 tablet by mouth daily (with breakfast)       Last Appointment Date: 11/27/2023  Next Appointment Date: 5/28/2024    No Known Allergies

## 2024-05-07 ENCOUNTER — TELEPHONE (OUTPATIENT)
Dept: SURGERY | Age: 64
End: 2024-05-07

## 2024-05-07 NOTE — TELEPHONE ENCOUNTER
Ok to schedule an appointment with me.  I will see and examine her to decide what studies she may need.    Thanks    This has been electronically signed by Vic Arceo PA-C.

## 2024-05-07 NOTE — TELEPHONE ENCOUNTER
Mariola called to speak with a nurse regarding finding a lump in left breast. Wanting to know what to do. Please advise.

## 2024-05-07 NOTE — TELEPHONE ENCOUNTER
Pt was last seen by Dr Chávez Dec 2023. Forwarding to Vic to see if pt needs imaging, appt with him or Dr Chávez etc.

## 2024-05-15 ENCOUNTER — OFFICE VISIT (OUTPATIENT)
Dept: SURGERY | Age: 64
End: 2024-05-15
Payer: MEDICARE

## 2024-05-15 VITALS — OXYGEN SATURATION: 98 % | WEIGHT: 128 LBS | HEART RATE: 74 BPM | BODY MASS INDEX: 23.55 KG/M2 | HEIGHT: 62 IN

## 2024-05-15 DIAGNOSIS — Z98.890 STATUS POST LEFT BREAST LUMPECTOMY: Primary | ICD-10-CM

## 2024-05-15 DIAGNOSIS — N63.21 MASS OF UPPER OUTER QUADRANT OF LEFT BREAST: ICD-10-CM

## 2024-05-15 PROCEDURE — 3017F COLORECTAL CA SCREEN DOC REV: CPT | Performed by: PHYSICIAN ASSISTANT

## 2024-05-15 PROCEDURE — G8420 CALC BMI NORM PARAMETERS: HCPCS | Performed by: PHYSICIAN ASSISTANT

## 2024-05-15 PROCEDURE — 1036F TOBACCO NON-USER: CPT | Performed by: PHYSICIAN ASSISTANT

## 2024-05-15 PROCEDURE — 99213 OFFICE O/P EST LOW 20 MIN: CPT | Performed by: PHYSICIAN ASSISTANT

## 2024-05-15 PROCEDURE — G8399 PT W/DXA RESULTS DOCUMENT: HCPCS | Performed by: PHYSICIAN ASSISTANT

## 2024-05-15 PROCEDURE — G8427 DOCREV CUR MEDS BY ELIG CLIN: HCPCS | Performed by: PHYSICIAN ASSISTANT

## 2024-05-15 PROCEDURE — 1090F PRES/ABSN URINE INCON ASSESS: CPT | Performed by: PHYSICIAN ASSISTANT

## 2024-05-15 PROCEDURE — 1123F ACP DISCUSS/DSCN MKR DOCD: CPT | Performed by: PHYSICIAN ASSISTANT

## 2024-05-15 NOTE — PROGRESS NOTES
Mariola Oneil comes today for her follow-up breast exam.  She has a new palpable mass mass in the left breast.  There is no skin or nipple changes.  There is no nipple discharge.  She has no appreciable evidence of supraclavicular or axillary adenopathy.  She noticed it about two weeks ago.    Of note, she is s/p left lumpectomy and IORT on 9/28/2018     An ultrasound guided breast biopsy  on the left which revealed a 1.1  cm low grade  invasive mammary carcinoma.  ER is positive at 99%.  [place] is positive at 21%.  Her-2 is negative by IHC.  Ki67 is 7% and low     MammaPrint demonstrated a low risk luminal-A phenotype     MRI 8/20/2018    Patient Active Problem List    Diagnosis Date Noted    Contact dermatitis due to poison ivy 07/21/2021    Osteoporosis 05/27/2020    Invasive ductal carcinoma of breast, left (HCC) 05/08/2020    Postmenopausal state 05/08/2020    Status post left breast lumpectomy with IORT on 9/28/2018 10/03/2018    Malignant neoplasm of overlapping sites of left breast in female, estrogen receptor positive (HCC) 09/11/2018    History of breast biopsy 03/14/2018    Carotid artery plaque     Abdominal hernia     Type 2 diabetes mellitus without complication (HCC)     Mixed hyperlipidemia     Essential hypertension        Current Outpatient Medications   Medication Sig Dispense Refill    atorvastatin (LIPITOR) 40 MG tablet TAKE ONE (1) TABLET BY MOUTH DAILY 90 tablet 0    irbesartan (AVAPRO) 75 MG tablet TAKE ONE-HALF TABLET BY MOUTH DAILY. GENERIC EQUIVALENT FOR AVAPRO 135 tablet 2    metFORMIN (GLUCOPHAGE XR) 500 MG extended release tablet Take 1 tablet by mouth daily (with breakfast) 90 tablet 0    montelukast (SINGULAIR) 10 MG tablet TAKE ONE (1) TABLET BY MOUTH DAILY 90 tablet 0    OZEMPIC, 1 MG/DOSE, 4 MG/3ML SOPN Inject 1 mg into the skin once a week 3 mL 3    tamoxifen (NOLVADEX) 20 MG tablet TAKE ONE (1) TABLET BY MOUTH DAILY 90 tablet 2    Cyanocobalamin (VITAMIN B 12 PO) Take by mouth

## 2024-05-22 DIAGNOSIS — E11.9 TYPE 2 DIABETES MELLITUS WITHOUT COMPLICATION, WITHOUT LONG-TERM CURRENT USE OF INSULIN (HCC): ICD-10-CM

## 2024-05-22 RX ORDER — METFORMIN HYDROCHLORIDE 500 MG/1
500 TABLET, EXTENDED RELEASE ORAL
Qty: 90 TABLET | Refills: 3 | Status: SHIPPED | OUTPATIENT
Start: 2024-05-22

## 2024-05-28 ENCOUNTER — HOSPITAL ENCOUNTER (OUTPATIENT)
Dept: WOMENS IMAGING | Age: 64
Discharge: HOME | End: 2024-05-28
Payer: MEDICARE

## 2024-05-28 ENCOUNTER — OFFICE VISIT (OUTPATIENT)
Dept: PRIMARY CARE CLINIC | Age: 64
End: 2024-05-28
Payer: MEDICARE

## 2024-05-28 VITALS
BODY MASS INDEX: 23.74 KG/M2 | DIASTOLIC BLOOD PRESSURE: 70 MMHG | WEIGHT: 129 LBS | TEMPERATURE: 97.5 F | HEART RATE: 73 BPM | HEIGHT: 62 IN | OXYGEN SATURATION: 96 % | SYSTOLIC BLOOD PRESSURE: 134 MMHG

## 2024-05-28 VITALS — BODY MASS INDEX: 23.74 KG/M2 | WEIGHT: 129 LBS | HEIGHT: 62 IN

## 2024-05-28 DIAGNOSIS — N63.21 MASS OF UPPER OUTER QUADRANT OF LEFT BREAST: ICD-10-CM

## 2024-05-28 DIAGNOSIS — E78.2 MIXED HYPERLIPIDEMIA: ICD-10-CM

## 2024-05-28 DIAGNOSIS — C50.912 INVASIVE DUCTAL CARCINOMA OF BREAST, LEFT (HCC): ICD-10-CM

## 2024-05-28 DIAGNOSIS — E11.9 TYPE 2 DIABETES MELLITUS WITHOUT COMPLICATION, WITHOUT LONG-TERM CURRENT USE OF INSULIN (HCC): ICD-10-CM

## 2024-05-28 DIAGNOSIS — I10 ESSENTIAL HYPERTENSION: ICD-10-CM

## 2024-05-28 DIAGNOSIS — Z00.00 INITIAL MEDICARE ANNUAL WELLNESS VISIT: Primary | ICD-10-CM

## 2024-05-28 LAB
ALBUMIN SERPL-MCNC: 4 G/DL (ref 3.5–5.2)
ALP SERPL-CCNC: 39 U/L (ref 35–104)
ALT SERPL-CCNC: 13 U/L (ref 5–33)
ANION GAP SERPL CALCULATED.3IONS-SCNC: 12 MMOL/L (ref 7–19)
AST SERPL-CCNC: 15 U/L (ref 5–32)
BASOPHILS # BLD: 0 K/UL (ref 0–0.2)
BASOPHILS NFR BLD: 0.5 % (ref 0–1)
BILIRUB SERPL-MCNC: 0.5 MG/DL (ref 0.2–1.2)
BUN SERPL-MCNC: 10 MG/DL (ref 8–23)
CALCIUM SERPL-MCNC: 9.3 MG/DL (ref 8.8–10.2)
CHLORIDE SERPL-SCNC: 105 MMOL/L (ref 98–111)
CHOLEST SERPL-MCNC: 120 MG/DL (ref 160–199)
CO2 SERPL-SCNC: 24 MMOL/L (ref 22–29)
CREAT SERPL-MCNC: 0.5 MG/DL (ref 0.5–0.9)
CREAT UR-MCNC: 117.4 MG/DL (ref 28–217)
EOSINOPHIL # BLD: 0.1 K/UL (ref 0–0.6)
EOSINOPHIL NFR BLD: 1.6 % (ref 0–5)
ERYTHROCYTE [DISTWIDTH] IN BLOOD BY AUTOMATED COUNT: 12.5 % (ref 11.5–14.5)
GLUCOSE SERPL-MCNC: 121 MG/DL (ref 74–109)
HBA1C MFR BLD: 6.5 % (ref 4–6)
HCT VFR BLD AUTO: 38.9 % (ref 37–47)
HDLC SERPL-MCNC: 56 MG/DL (ref 65–121)
HGB BLD-MCNC: 12.8 G/DL (ref 12–16)
IMM GRANULOCYTES # BLD: 0 K/UL
LDLC SERPL CALC-MCNC: 50 MG/DL
LYMPHOCYTES # BLD: 2.6 K/UL (ref 1.1–4.5)
LYMPHOCYTES NFR BLD: 32.3 % (ref 20–40)
MCH RBC QN AUTO: 31.6 PG (ref 27–31)
MCHC RBC AUTO-ENTMCNC: 32.9 G/DL (ref 33–37)
MCV RBC AUTO: 96 FL (ref 81–99)
MICROALBUMIN UR-MCNC: 3 MG/DL (ref 0–19)
MICROALBUMIN/CREAT UR-RTO: 25.6 MG/G
MONOCYTES # BLD: 0.5 K/UL (ref 0–0.9)
MONOCYTES NFR BLD: 6.2 % (ref 0–10)
NEUTROPHILS # BLD: 4.8 K/UL (ref 1.5–7.5)
NEUTS SEG NFR BLD: 59.3 % (ref 50–65)
PLATELET # BLD AUTO: 236 K/UL (ref 130–400)
PMV BLD AUTO: 10.8 FL (ref 9.4–12.3)
POTASSIUM SERPL-SCNC: 3.9 MMOL/L (ref 3.5–5)
PROT SERPL-MCNC: 6.8 G/DL (ref 6.6–8.7)
RBC # BLD AUTO: 4.05 M/UL (ref 4.2–5.4)
SODIUM SERPL-SCNC: 141 MMOL/L (ref 136–145)
TRIGL SERPL-MCNC: 69 MG/DL (ref 0–149)
TSH SERPL DL<=0.005 MIU/L-ACNC: 1.29 UIU/ML (ref 0.27–4.2)
WBC # BLD AUTO: 8 K/UL (ref 4.8–10.8)

## 2024-05-28 PROCEDURE — 3078F DIAST BP <80 MM HG: CPT | Performed by: NURSE PRACTITIONER

## 2024-05-28 PROCEDURE — 1123F ACP DISCUSS/DSCN MKR DOCD: CPT | Performed by: NURSE PRACTITIONER

## 2024-05-28 PROCEDURE — G0279 TOMOSYNTHESIS, MAMMO: HCPCS

## 2024-05-28 PROCEDURE — 3075F SYST BP GE 130 - 139MM HG: CPT | Performed by: NURSE PRACTITIONER

## 2024-05-28 PROCEDURE — 3046F HEMOGLOBIN A1C LEVEL >9.0%: CPT | Performed by: NURSE PRACTITIONER

## 2024-05-28 PROCEDURE — 76642 ULTRASOUND BREAST LIMITED: CPT

## 2024-05-28 PROCEDURE — G0438 PPPS, INITIAL VISIT: HCPCS | Performed by: NURSE PRACTITIONER

## 2024-05-28 PROCEDURE — 3017F COLORECTAL CA SCREEN DOC REV: CPT | Performed by: NURSE PRACTITIONER

## 2024-05-28 SDOH — ECONOMIC STABILITY: INCOME INSECURITY: HOW HARD IS IT FOR YOU TO PAY FOR THE VERY BASICS LIKE FOOD, HOUSING, MEDICAL CARE, AND HEATING?: NOT HARD AT ALL

## 2024-05-28 SDOH — ECONOMIC STABILITY: FOOD INSECURITY: WITHIN THE PAST 12 MONTHS, YOU WORRIED THAT YOUR FOOD WOULD RUN OUT BEFORE YOU GOT MONEY TO BUY MORE.: NEVER TRUE

## 2024-05-28 SDOH — ECONOMIC STABILITY: FOOD INSECURITY: WITHIN THE PAST 12 MONTHS, THE FOOD YOU BOUGHT JUST DIDN'T LAST AND YOU DIDN'T HAVE MONEY TO GET MORE.: NEVER TRUE

## 2024-05-28 ASSESSMENT — PATIENT HEALTH QUESTIONNAIRE - PHQ9
1. LITTLE INTEREST OR PLEASURE IN DOING THINGS: MORE THAN HALF THE DAYS
8. MOVING OR SPEAKING SO SLOWLY THAT OTHER PEOPLE COULD HAVE NOTICED. OR THE OPPOSITE, BEING SO FIGETY OR RESTLESS THAT YOU HAVE BEEN MOVING AROUND A LOT MORE THAN USUAL: NOT AT ALL
10. IF YOU CHECKED OFF ANY PROBLEMS, HOW DIFFICULT HAVE THESE PROBLEMS MADE IT FOR YOU TO DO YOUR WORK, TAKE CARE OF THINGS AT HOME, OR GET ALONG WITH OTHER PEOPLE: NOT DIFFICULT AT ALL
3. TROUBLE FALLING OR STAYING ASLEEP: SEVERAL DAYS
SUM OF ALL RESPONSES TO PHQ QUESTIONS 1-9: 5
2. FEELING DOWN, DEPRESSED OR HOPELESS: SEVERAL DAYS
SUM OF ALL RESPONSES TO PHQ9 QUESTIONS 1 & 2: 3
7. TROUBLE CONCENTRATING ON THINGS, SUCH AS READING THE NEWSPAPER OR WATCHING TELEVISION: NOT AT ALL
5. POOR APPETITE OR OVEREATING: NOT AT ALL
9. THOUGHTS THAT YOU WOULD BE BETTER OFF DEAD, OR OF HURTING YOURSELF: NOT AT ALL
4. FEELING TIRED OR HAVING LITTLE ENERGY: SEVERAL DAYS
SUM OF ALL RESPONSES TO PHQ QUESTIONS 1-9: 5

## 2024-05-28 ASSESSMENT — LIFESTYLE VARIABLES
HOW MANY STANDARD DRINKS CONTAINING ALCOHOL DO YOU HAVE ON A TYPICAL DAY: 1 OR 2
HOW OFTEN DO YOU HAVE A DRINK CONTAINING ALCOHOL: MONTHLY OR LESS

## 2024-05-28 NOTE — PATIENT INSTRUCTIONS
such as acetaminophen (Tylenol).   When should you call for help?   Call 911 if you have symptoms of a heart attack. These may include:    Chest pain or pressure, or a strange feeling in the chest.     Sweating.     Shortness of breath.     Pain, pressure, or a strange feeling in the back, neck, jaw, or upper belly or in one or both shoulders or arms.     Lightheadedness or sudden weakness.     A fast or irregular heartbeat.   After you call 911, the  may tell you to chew 1 adult-strength or 2 to 4 low-dose aspirin. Wait for an ambulance. Do not try to drive yourself.  Watch closely for changes in your health, and be sure to contact your doctor if you have any problems.  Where can you learn more?  Go to https://www.[employer].net/patientEd and enter F075 to learn more about \"A Healthy Heart: Care Instructions.\"  Current as of: June 24, 2023               Content Version: 14.0  © 9172-6845 [employer].   Care instructions adapted under license by [employer]. If you have questions about a medical condition or this instruction, always ask your healthcare professional. [employer] disclaims any warranty or liability for your use of this information.      Personalized Preventive Plan for Mariola Oneil - 5/28/2024  Medicare offers a range of preventive health benefits. Some of the tests and screenings are paid in full while other may be subject to a deductible, co-insurance, and/or copay.    Some of these benefits include a comprehensive review of your medical history including lifestyle, illnesses that may run in your family, and various assessments and screenings as appropriate.    After reviewing your medical record and screening and assessments performed today your provider may have ordered immunizations, labs, imaging, and/or referrals for you.  A list of these orders (if applicable) as well as your Preventive Care list are included within your After Visit Summary for your

## 2024-05-28 NOTE — PROGRESS NOTES
Medicare Annual Wellness Visit    Mariola Oneil is here for 6 Month Follow-Up (Pt in office for 6 month follow up - stated that she has follow up breast us today )    Assessment & Plan   Initial Medicare annual wellness visit  Invasive ductal carcinoma of breast, left (HCC)  Type 2 diabetes mellitus without complication, without long-term current use of insulin (HCC)    Recommendations for Preventive Services Due: see orders and patient instructions/AVS.  Recommended screening schedule for the next 5-10 years is provided to the patient in written form: see Patient Instructions/AVS.     No follow-ups on file.     Subjective   The following acute and/or chronic problems were also addressed today:  None    Patient's complete Health Risk Assessment and screening values have been reviewed and are found in Flowsheets. The following problems were reviewed today and where indicated follow up appointments were made and/or referrals ordered.    Positive Risk Factor Screenings with Interventions:        Depression:  PHQ-2 Score: 3  PHQ-9 Total Score: 5    Interpretation:  5-9 mild   10-14 moderate   15-19 moderately severe   20-27 severe   Interventions:  Life style changes to improve mood reviewed           Activity, Diet, and Weight:  On average, how many days per week do you engage in moderate to strenuous exercise (like a brisk walk)?: 3 days  On average, how many minutes do you engage in exercise at this level?: 40 min    Do you eat balanced/healthy meals regularly?: (!) No    Body mass index is 23.59 kg/m².    Do you eat balanced/healthy meals regularly Interventions:  Patient advised to follow-up in this office for further evaluation and treatment               LDCT Screening: Discussed with patient the benefits and harms of screening, follow-up diagnostic testing, over-diagnosis, false positive rate, and total radiation exposure. Counseled on the importance of adherence to annual lung cancer LDCT screening, impact of

## 2024-06-20 DIAGNOSIS — E78.2 MIXED HYPERLIPIDEMIA: ICD-10-CM

## 2024-06-24 NOTE — TELEPHONE ENCOUNTER
Mariola BERNADETTE Oneil called to request a refill on her medication.      Last office visit : 5/28/2024   Next office visit : Visit date not found     Requested Prescriptions     Pending Prescriptions Disp Refills    atorvastatin (LIPITOR) 40 MG tablet [Pharmacy Med Name: ATORVASTATIN TABS 40MG] 90 tablet 3     Sig: TAKE 1 TABLET DAILY    montelukast (SINGULAIR) 10 MG tablet [Pharmacy Med Name: MONTELUKAST SODIUM TABS 10MG] 90 tablet 3     Sig: TAKE 1 TABLET DAILY            Janelle Bowles MA

## 2024-06-25 ENCOUNTER — TELEPHONE (OUTPATIENT)
Dept: HEMATOLOGY | Age: 64
End: 2024-06-25

## 2024-06-25 RX ORDER — MONTELUKAST SODIUM 10 MG/1
TABLET ORAL
Qty: 90 TABLET | Refills: 3 | Status: SHIPPED | OUTPATIENT
Start: 2024-06-25

## 2024-06-25 RX ORDER — ATORVASTATIN CALCIUM 40 MG/1
TABLET, FILM COATED ORAL
Qty: 90 TABLET | Refills: 3 | Status: SHIPPED | OUTPATIENT
Start: 2024-06-25

## 2024-06-25 NOTE — TELEPHONE ENCOUNTER
Called patient and reminded patient of their appointment on 6/28/2024 and patient confirmed they would be here.

## 2024-06-26 DIAGNOSIS — C50.812 MALIGNANT NEOPLASM OF OVERLAPPING SITES OF LEFT BREAST IN FEMALE, ESTROGEN RECEPTOR POSITIVE (HCC): Primary | ICD-10-CM

## 2024-06-26 DIAGNOSIS — Z17.0 MALIGNANT NEOPLASM OF OVERLAPPING SITES OF LEFT BREAST IN FEMALE, ESTROGEN RECEPTOR POSITIVE (HCC): Primary | ICD-10-CM

## 2024-06-26 DIAGNOSIS — Z79.899 MEDICATION MANAGEMENT: ICD-10-CM

## 2024-06-26 NOTE — PROGRESS NOTES
Progress Note      Pt Name: Mariola Oneil  YOB: 1960  MRN: [id number]    Date of evaluation: 06/27/2024    History Obtained From:  patient, electronic medical record    CHIEF COMPLAINT:    Chief Complaint   Patient presents with    Follow-up     Malignant neoplasm of overlapping sites of left breast in female, estrogen receptor positive (HCC)     HISTORY OF PRESENT ILLNESS:    Mariola Oneil is a 64 y.o.  female who is being followed for invasive low-grade ductal carcinoma of the left breast, 8/1/2018 and osteoporosis.  She is status post left lumpectomy and IORT on 9/28/2018. Current recommendation is for endocrine therapy with tamoxifen 20 mg daily for 10 years through August 2028 and Prolia 60 mg subcu every 6 months for her osteoporosis, last dose of Prolia was given on 12/27/2023.  She has had no mammographic imaging to suggest progression of disease.  Sweta returns today in scheduled follow-up for evaluation, lab monitoring, side effect monitoring and further treatment recommendations.     Today's clinic visit to include physical assessment, review of systems, any lab or radiographic findings that were available and plan of care are documented below.    ONCOLOGIC HISTORY:     Diagnosis:   Invasive low grade ductal carcinoma with associated intermediate grade ductal carcinoma in situ, 8/1/2018   ER 99%, [place] 21%, HER-2/nancy 1+ by IHC   Ki-67 is 7%, low   pT1b, pN0   Low risk, luminal type A by Mammaprint   Tumor 1.1 cm    Treatment summary:  Letrozole 2.5 mg daily initiated in 8/29/2018   Left lumpectomy and IORT on 9/28/2018  Left axilla sentinel lymph node biopsy, total of 5 nodes negative, 11/6/2018  Initiated Prolia 60 mg subcu to be given every 6 months on 6/1/2020 5/27/2020-letrozole discontinued and initiated on tamoxifen 20 mg daily, anticipate total dosing 10 years through August 2028    Ms. Mariola Oneil was seen in initial oncology consultation on 11/20/2018 for a recent

## 2024-06-27 ENCOUNTER — OFFICE VISIT (OUTPATIENT)
Dept: HEMATOLOGY | Age: 64
End: 2024-06-27

## 2024-06-27 ENCOUNTER — HOSPITAL ENCOUNTER (OUTPATIENT)
Dept: INFUSION THERAPY | Age: 64
Discharge: HOME | End: 2024-06-27
Payer: MEDICARE

## 2024-06-27 VITALS
SYSTOLIC BLOOD PRESSURE: 142 MMHG | HEIGHT: 62 IN | WEIGHT: 131.7 LBS | OXYGEN SATURATION: 98 % | DIASTOLIC BLOOD PRESSURE: 76 MMHG | TEMPERATURE: 99 F | HEART RATE: 81 BPM | BODY MASS INDEX: 24.24 KG/M2

## 2024-06-27 DIAGNOSIS — M81.0 OSTEOPOROSIS, UNSPECIFIED OSTEOPOROSIS TYPE, UNSPECIFIED PATHOLOGICAL FRACTURE PRESENCE: Primary | ICD-10-CM

## 2024-06-27 DIAGNOSIS — C50.812 MALIGNANT NEOPLASM OF OVERLAPPING SITES OF LEFT BREAST IN FEMALE, ESTROGEN RECEPTOR POSITIVE (HCC): ICD-10-CM

## 2024-06-27 DIAGNOSIS — Z51.81 ENCOUNTER FOR MONITORING BISPHOSPHONATE THERAPY: ICD-10-CM

## 2024-06-27 DIAGNOSIS — M81.0 OSTEOPOROSIS WITHOUT CURRENT PATHOLOGICAL FRACTURE, UNSPECIFIED OSTEOPOROSIS TYPE: ICD-10-CM

## 2024-06-27 DIAGNOSIS — C50.812 MALIGNANT NEOPLASM OF OVERLAPPING SITES OF LEFT BREAST IN FEMALE, ESTROGEN RECEPTOR POSITIVE (HCC): Primary | ICD-10-CM

## 2024-06-27 DIAGNOSIS — Z17.0 MALIGNANT NEOPLASM OF OVERLAPPING SITES OF LEFT BREAST IN FEMALE, ESTROGEN RECEPTOR POSITIVE (HCC): ICD-10-CM

## 2024-06-27 DIAGNOSIS — Z79.83 ENCOUNTER FOR MONITORING BISPHOSPHONATE THERAPY: ICD-10-CM

## 2024-06-27 DIAGNOSIS — Z17.0 MALIGNANT NEOPLASM OF OVERLAPPING SITES OF LEFT BREAST IN FEMALE, ESTROGEN RECEPTOR POSITIVE (HCC): Primary | ICD-10-CM

## 2024-06-27 DIAGNOSIS — Z79.899 MEDICATION MANAGEMENT: ICD-10-CM

## 2024-06-27 DIAGNOSIS — Z51.81 ENCOUNTER FOR MONITORING TAMOXIFEN THERAPY: ICD-10-CM

## 2024-06-27 DIAGNOSIS — Z79.810 ENCOUNTER FOR MONITORING TAMOXIFEN THERAPY: ICD-10-CM

## 2024-06-27 LAB
ALBUMIN SERPL-MCNC: 3.8 G/DL (ref 3.5–5.2)
ALP SERPL-CCNC: 43 U/L (ref 35–104)
ALT SERPL-CCNC: 13 U/L (ref 9–52)
ANION GAP SERPL CALCULATED.3IONS-SCNC: 6 MMOL/L (ref 7–19)
AST SERPL-CCNC: 19 U/L (ref 14–36)
BASOPHILS # BLD: 0.04 K/UL (ref 0.01–0.08)
BASOPHILS NFR BLD: 0.5 % (ref 0.1–1.2)
BILIRUB SERPL-MCNC: 0.6 MG/DL (ref 0.2–1.3)
BUN SERPL-MCNC: 17 MG/DL (ref 7–17)
CALCIUM SERPL-MCNC: 9.6 MG/DL (ref 8.4–10.2)
CHLORIDE SERPL-SCNC: 102 MMOL/L (ref 98–111)
CO2 SERPL-SCNC: 30 MMOL/L (ref 22–29)
CREAT SERPL-MCNC: 0.6 MG/DL (ref 0.5–1)
EOSINOPHIL # BLD: 0.11 K/UL (ref 0.04–0.54)
EOSINOPHIL NFR BLD: 1.4 % (ref 0.7–7)
ERYTHROCYTE [DISTWIDTH] IN BLOOD BY AUTOMATED COUNT: 12.4 % (ref 11.7–14.4)
GLOBULIN: 2.6 G/DL
GLUCOSE SERPL-MCNC: 156 MG/DL (ref 74–106)
HCT VFR BLD AUTO: 36.6 % (ref 34.1–44.9)
HGB BLD-MCNC: 12.4 G/DL (ref 11.2–15.7)
LYMPHOCYTES # BLD: 2.25 K/UL (ref 1.18–3.74)
LYMPHOCYTES NFR BLD: 29.5 % (ref 19.3–53.1)
MAGNESIUM SERPL-MCNC: 1.4 MG/DL (ref 1.6–2.3)
MCH RBC QN AUTO: 31.8 PG (ref 25.6–32.2)
MCHC RBC AUTO-ENTMCNC: 33.9 G/DL (ref 32.3–35.5)
MCV RBC AUTO: 93.8 FL (ref 79.4–94.8)
MONOCYTES # BLD: 0.54 K/UL (ref 0.24–0.82)
MONOCYTES NFR BLD: 7.1 % (ref 4.7–12.5)
NEUTROPHILS # BLD: 4.66 K/UL (ref 1.56–6.13)
NEUTS SEG NFR BLD: 61.2 % (ref 34–71.1)
PHOSPHATE SERPL-MCNC: 3.2 MG/DL (ref 2.5–4.5)
PLATELET # BLD AUTO: 218 K/UL (ref 182–369)
PMV BLD AUTO: 9.6 FL (ref 7.4–10.4)
POTASSIUM SERPL-SCNC: 3.8 MMOL/L (ref 3.5–5.1)
PROT SERPL-MCNC: 6.3 G/DL (ref 6.3–8.2)
RBC # BLD AUTO: 3.9 M/UL (ref 3.93–5.22)
SODIUM SERPL-SCNC: 138 MMOL/L (ref 137–145)
WBC # BLD AUTO: 7.62 K/UL (ref 3.98–10.04)

## 2024-06-27 PROCEDURE — 80053 COMPREHEN METABOLIC PANEL: CPT

## 2024-06-27 PROCEDURE — 85025 COMPLETE CBC W/AUTO DIFF WBC: CPT

## 2024-06-27 PROCEDURE — 84100 ASSAY OF PHOSPHORUS: CPT

## 2024-06-27 PROCEDURE — 36415 COLL VENOUS BLD VENIPUNCTURE: CPT

## 2024-06-27 PROCEDURE — 99213 OFFICE O/P EST LOW 20 MIN: CPT

## 2024-06-27 PROCEDURE — 83735 ASSAY OF MAGNESIUM: CPT

## 2024-06-27 PROCEDURE — 6360000002 HC RX W HCPCS: Performed by: NURSE PRACTITIONER

## 2024-06-27 PROCEDURE — 96372 THER/PROPH/DIAG INJ SC/IM: CPT

## 2024-06-27 RX ADMIN — DENOSUMAB 60 MG: 60 INJECTION SUBCUTANEOUS at 10:19

## 2024-06-28 ENCOUNTER — TELEPHONE (OUTPATIENT)
Dept: INFUSION THERAPY | Age: 64
End: 2024-06-28

## 2024-06-28 DIAGNOSIS — E83.42 HYPOMAGNESEMIA: Primary | ICD-10-CM

## 2024-06-28 DIAGNOSIS — Z17.0 MALIGNANT NEOPLASM OF OVERLAPPING SITES OF LEFT BREAST IN FEMALE, ESTROGEN RECEPTOR POSITIVE (HCC): ICD-10-CM

## 2024-06-28 DIAGNOSIS — C50.812 MALIGNANT NEOPLASM OF OVERLAPPING SITES OF LEFT BREAST IN FEMALE, ESTROGEN RECEPTOR POSITIVE (HCC): ICD-10-CM

## 2024-06-28 NOTE — TELEPHONE ENCOUNTER
----- Message from JAVAN Guzman sent at 6/28/2024 11:54 AM CDT -----  Please call patient and ask her to start taking magnesium supplement, can send electronic prescription for 400 mg p.o. twice daily.  She is scheduled for a bone density study on 7/15/2024, asked her to go by the outpatient lab at the hospital and have her repeat magnesium and CMP panel completed, if she agrees please place the lab orders

## 2024-06-28 NOTE — TELEPHONE ENCOUNTER
Spoke with Mariola regarding  taking magnesium supplement, 400 mg p.o. twice daily and have She will get  repeat magnesium and CMP at outpatient lab. I explained that it is on 4th floor here in [place].

## 2024-07-15 ENCOUNTER — HOSPITAL ENCOUNTER (OUTPATIENT)
Dept: WOMENS IMAGING | Age: 64
Discharge: HOME | End: 2024-07-15
Payer: MEDICARE

## 2024-07-15 DIAGNOSIS — Z17.0 MALIGNANT NEOPLASM OF OVERLAPPING SITES OF LEFT BREAST IN FEMALE, ESTROGEN RECEPTOR POSITIVE (HCC): ICD-10-CM

## 2024-07-15 DIAGNOSIS — M81.0 OSTEOPOROSIS WITHOUT CURRENT PATHOLOGICAL FRACTURE, UNSPECIFIED OSTEOPOROSIS TYPE: ICD-10-CM

## 2024-07-15 DIAGNOSIS — E83.42 HYPOMAGNESEMIA: ICD-10-CM

## 2024-07-15 DIAGNOSIS — C50.812 MALIGNANT NEOPLASM OF OVERLAPPING SITES OF LEFT BREAST IN FEMALE, ESTROGEN RECEPTOR POSITIVE (HCC): ICD-10-CM

## 2024-07-15 LAB
ALBUMIN SERPL-MCNC: 3.8 G/DL (ref 3.5–5.2)
ALP SERPL-CCNC: 42 U/L (ref 35–104)
ALT SERPL-CCNC: 13 U/L (ref 5–33)
ANION GAP SERPL CALCULATED.3IONS-SCNC: 14 MMOL/L (ref 7–19)
AST SERPL-CCNC: 15 U/L (ref 5–32)
BILIRUB SERPL-MCNC: 0.3 MG/DL (ref 0.2–1.2)
BUN SERPL-MCNC: 11 MG/DL (ref 8–23)
CALCIUM SERPL-MCNC: 9.3 MG/DL (ref 8.8–10.2)
CHLORIDE SERPL-SCNC: 106 MMOL/L (ref 98–111)
CO2 SERPL-SCNC: 23 MMOL/L (ref 22–29)
CREAT SERPL-MCNC: 0.5 MG/DL (ref 0.5–0.9)
GLUCOSE SERPL-MCNC: 162 MG/DL (ref 74–109)
MAGNESIUM SERPL-MCNC: 1.9 MG/DL (ref 1.6–2.4)
POTASSIUM SERPL-SCNC: 4 MMOL/L (ref 3.5–5)
PROT SERPL-MCNC: 6 G/DL (ref 6.6–8.7)
SODIUM SERPL-SCNC: 143 MMOL/L (ref 136–145)

## 2024-07-15 PROCEDURE — 77080 DXA BONE DENSITY AXIAL: CPT

## 2024-10-30 ENCOUNTER — OFFICE VISIT (OUTPATIENT)
Dept: PRIMARY CARE CLINIC | Age: 64
End: 2024-10-30

## 2024-10-30 VITALS
HEART RATE: 102 BPM | TEMPERATURE: 99.3 F | HEIGHT: 62 IN | DIASTOLIC BLOOD PRESSURE: 78 MMHG | OXYGEN SATURATION: 95 % | WEIGHT: 132 LBS | BODY MASS INDEX: 24.29 KG/M2 | SYSTOLIC BLOOD PRESSURE: 156 MMHG

## 2024-10-30 DIAGNOSIS — E78.2 MIXED HYPERLIPIDEMIA: ICD-10-CM

## 2024-10-30 DIAGNOSIS — M81.0 OSTEOPOROSIS, UNSPECIFIED OSTEOPOROSIS TYPE, UNSPECIFIED PATHOLOGICAL FRACTURE PRESENCE: ICD-10-CM

## 2024-10-30 DIAGNOSIS — R06.02 SHORTNESS OF BREATH: ICD-10-CM

## 2024-10-30 DIAGNOSIS — I10 ESSENTIAL HYPERTENSION: ICD-10-CM

## 2024-10-30 DIAGNOSIS — E11.9 TYPE 2 DIABETES MELLITUS WITHOUT COMPLICATION, WITHOUT LONG-TERM CURRENT USE OF INSULIN (HCC): ICD-10-CM

## 2024-10-30 DIAGNOSIS — U07.1 COVID-19: Primary | ICD-10-CM

## 2024-10-30 DIAGNOSIS — R00.0 TACHYCARDIA: ICD-10-CM

## 2024-10-30 DIAGNOSIS — C50.912 INVASIVE DUCTAL CARCINOMA OF BREAST, LEFT (HCC): ICD-10-CM

## 2024-10-30 LAB
INFLUENZA A ANTIGEN, POC: NEGATIVE
INFLUENZA B ANTIGEN, POC: NEGATIVE
LOT EXPIRE DATE: ABNORMAL
LOT KIT NUMBER: ABNORMAL
SARS-COV-2, POC: DETECTED
VALID INTERNAL CONTROL: ABNORMAL
VENDOR AND KIT NAME POC: ABNORMAL

## 2024-10-30 RX ORDER — METOPROLOL SUCCINATE 25 MG/1
25 TABLET, EXTENDED RELEASE ORAL DAILY
Qty: 30 TABLET | Refills: 0 | Status: SHIPPED | OUTPATIENT
Start: 2024-10-30 | End: 2024-10-30

## 2024-10-30 ASSESSMENT — ENCOUNTER SYMPTOMS
COLOR CHANGE: 0
VOICE CHANGE: 0
NAUSEA: 0
VOMITING: 0
PHOTOPHOBIA: 0
RHINORRHEA: 0
SHORTNESS OF BREATH: 0
BACK PAIN: 0
COUGH: 0

## 2024-10-30 NOTE — PATIENT INSTRUCTIONS
Increase irbesartan to whole tablet (75 mg) daily.    Follow-up in 2 weeks for BP check.   Increase fluid intake.  Go to ER for any worsening symptoms.

## 2024-10-30 NOTE — PROGRESS NOTES
Metabolic Panel    TSH    CBC with Auto Differential    EKG 12 lead      4. Essential hypertension  Vitamin D 25 Hydroxy    Lipid Panel    Hemoglobin A1C    Comprehensive Metabolic Panel    TSH    CBC with Auto Differential    EKG 12 lead    POCT COVID-19 & Influenza A/B      5. Invasive ductal carcinoma of breast, left (HCC)  Vitamin D 25 Hydroxy    Lipid Panel    Hemoglobin A1C    Comprehensive Metabolic Panel    TSH    CBC with Auto Differential      6. Osteoporosis, unspecified osteoporosis type, unspecified pathological fracture presence  Vitamin D 25 Hydroxy    Lipid Panel    Hemoglobin A1C    Comprehensive Metabolic Panel    TSH    CBC with Auto Differential      7. Tachycardia  Vitamin D 25 Hydroxy    Lipid Panel    Hemoglobin A1C    Comprehensive Metabolic Panel    TSH    CBC with Auto Differential    EKG 12 lead    POCT COVID-19 & Influenza A/B      8. Shortness of breath          No results found for this visit on 10/30/24.      Plan:   Total time spent today caring for this patient was 40 minutes    Patient positive for covid19 today. Will see her back in 2 weeks to recheck BP and HR; if continues to be symptomatic will proceed with cardiac testing.     Increase irbesartan to whole tablet (75 mg) daily.    Follow-up in 2 weeks for BP check.   Increase fluid intake.  Go to ER for any worsening symptoms.       PDMP Monitoring:    Last PDMP [place]at[place] as Reviewed:  Review User Review Instant Review Result            Urine Drug Screenings (1 yr)    No resulted procedures found.       Medication Contract and Consent for Opioid Use Documents Filed        No documents found                     Patient given educational materials -see patient instructions.  Discussed use, benefit, and side effects of prescribed medications.  All patient questions answered.  Pt voiced understanding. Reviewed health maintenance.  Instructed to continue currentmedications, diet and exercise.  Patient agreed with treatment plan. Follow up

## 2024-11-07 ENCOUNTER — TELEPHONE (OUTPATIENT)
Dept: PRIMARY CARE CLINIC | Age: 64
End: 2024-11-07

## 2024-11-07 NOTE — TELEPHONE ENCOUNTER
Thought she had 1 pen left of Ozempic but she is completely out and needs a refill sent to [facility]. She also states she still has some Victoza and wants to know if she should use this until the Ozempic comes in through mail order.

## 2024-11-08 DIAGNOSIS — E11.9 TYPE 2 DIABETES MELLITUS WITHOUT COMPLICATION, WITHOUT LONG-TERM CURRENT USE OF INSULIN (HCC): ICD-10-CM

## 2024-11-08 DIAGNOSIS — I10 ESSENTIAL HYPERTENSION: ICD-10-CM

## 2024-11-08 NOTE — TELEPHONE ENCOUNTER
Mariola FLEMING Clarice called to request a refill on her medication.      Last office visit : 10/30/2024   Next office visit : 11/13/2024     Requested Prescriptions     Pending Prescriptions Disp Refills    OZEMPIC, 1 MG/DOSE, 4 MG/3ML SOPN sc injection 3 mL 0     Sig: Inject 1 mg into the skin once a week    irbesartan (AVAPRO) 75 MG tablet 135 tablet 2     Sig: TAKE ONE-HALF TABLET BY MOUTH DAILY. GENERIC EQUIVALENT FOR AVAPROTAKE ONE-HALF TABLET BY MOUTH DAILY. GENERIC EQUIVALENT FOR AVAPRO            Morris Matthews MA

## 2024-11-11 RX ORDER — IRBESARTAN 75 MG/1
TABLET ORAL
Qty: 135 TABLET | Refills: 2 | Status: SHIPPED | OUTPATIENT
Start: 2024-11-11 | End: 2024-11-13 | Stop reason: SDUPTHER

## 2024-11-11 RX ORDER — SEMAGLUTIDE 1.34 MG/ML
1 INJECTION, SOLUTION SUBCUTANEOUS WEEKLY
Qty: 3 ML | Refills: 0 | Status: SHIPPED | OUTPATIENT
Start: 2024-11-11 | End: 2024-11-13 | Stop reason: SDUPTHER

## 2024-11-13 ENCOUNTER — OFFICE VISIT (OUTPATIENT)
Dept: PRIMARY CARE CLINIC | Age: 64
End: 2024-11-13

## 2024-11-13 VITALS
WEIGHT: 132.8 LBS | HEART RATE: 85 BPM | OXYGEN SATURATION: 98 % | DIASTOLIC BLOOD PRESSURE: 78 MMHG | SYSTOLIC BLOOD PRESSURE: 142 MMHG | TEMPERATURE: 97.9 F | BODY MASS INDEX: 24.44 KG/M2 | HEIGHT: 62 IN

## 2024-11-13 DIAGNOSIS — E11.9 TYPE 2 DIABETES MELLITUS WITHOUT COMPLICATION, WITHOUT LONG-TERM CURRENT USE OF INSULIN (HCC): ICD-10-CM

## 2024-11-13 DIAGNOSIS — I10 ESSENTIAL HYPERTENSION: Primary | ICD-10-CM

## 2024-11-13 RX ORDER — SEMAGLUTIDE 1.34 MG/ML
1 INJECTION, SOLUTION SUBCUTANEOUS WEEKLY
Qty: 3 ML | Refills: 5 | Status: SHIPPED | OUTPATIENT
Start: 2024-11-13

## 2024-11-13 RX ORDER — IRBESARTAN 75 MG/1
75 TABLET ORAL DAILY
Qty: 90 TABLET | Refills: 3 | Status: SHIPPED | OUTPATIENT
Start: 2024-11-13 | End: 2025-02-11

## 2024-11-13 ASSESSMENT — ENCOUNTER SYMPTOMS
VOICE CHANGE: 0
BACK PAIN: 0
COLOR CHANGE: 0
SHORTNESS OF BREATH: 0
COUGH: 0
VOMITING: 0
NAUSEA: 0
RHINORRHEA: 0
PHOTOPHOBIA: 0

## 2024-11-13 NOTE — PROGRESS NOTES
[facility]  [address]  [address]  [place] [address]  Dept: [phone]  Dept Fax: [phone]  Loc: [phone]    Mariola Oneil is a 64 y.o. female who presents today for her medical conditions/complaints as noted below.  Mariola Oneil is c/o of 2 Week Follow-Up (Pt in office to follow up on bp )        HPI:     HPI   Chief Complaint   Patient presents with    2 Week Follow-Up     Pt in office to follow up on bp      Patient presents today for follow-up hypertension. Two weeks ago she was seen in office with elevated blood pressure. She actually tested positive for covid that day as well. Irbesartan was increased to 75 mg daily. Today her blood pressure is 142/78 and at home it has been in normal range.     Past Medical History:   Diagnosis Date    Abdominal hernia     Breast cancer (HCC)     Cancer (HCC)     breast cancer    Carotid artery plaque     9/16 <50% rt;  50-69% left    Essential hypertension     H/O abnormal cervical Papanicolaou smear     Mixed hyperlipidemia     Osteopenia     Simple goiter     Type 2 diabetes mellitus without complication (HCC)     Umbilical hernia       Past Surgical History:   Procedure Laterality Date    BREAST LUMPECTOMY      CHOLECYSTECTOMY  1997    COLONOSCOPY      COLPOSCOPY      6/07 Dr Haines, no dysplasia    CT BX/EXC LYMPH NODE OPEN DEEP AXILLARY NODE Left 11/6/2018    BREAST BIOPSY SENTINEL NODE performed by Hill Chávez MD at [facility] OR    CT MASTECTOMY PARTIAL Left 9/28/2018    BREAST LUMPECTOMY AND INTRAOPERATIVE ULTRASOUND GUIDED NEEDLE LOCALIZATION AND IORT performed by Hill Chávez MD at [facility] OR           11/13/2024    12:47 PM 10/30/2024     2:29 PM 6/27/2024     9:41 AM 5/28/2024    12:15 PM 5/28/2024     9:16 AM 5/15/2024    10:35 AM   Vitals   SYSTOLIC 142 156 142  134    DIASTOLIC 78 78 76  70    Site   Right Upper Arm      Position   Sitting      Pulse 85 102 81  73 74   Temp 97.9 °F (36.6 °C) 99.3 °F

## 2024-11-13 NOTE — PATIENT INSTRUCTIONS
Fasting labs to be completed.   Continue irbesartan 75 mg daily.   Follow-up in 6 months or sooner if needed.

## 2024-12-11 NOTE — PROGRESS NOTES
HISTORY OF PRESENT ILLNESS:    Ms. Oneil presents today for a breast exam following left lumpectomy and IORT on 9/28/2018    An ultrasound guided breast biopsy  on the left which revealed a 1.1  cm low grade  invasive mammary carcinoma.  ER is positive at 99%.  TN is positive at 21%.  Her-2 is negative by IHC.  Ki67 is 7% and low    MammaPrint demonstrated a low risk luminal-A phenotype    Mammogram-12/23/2024  EXAM: BILATERAL DIGITAL SCREENING MAMMOGRAM.     TECHNIQUE: Bilateral digital CC and MLO images with tomosynthesis.  CAD was utilized.     HISTORY: Routine screening mammogram.  Personal history left breast cancer with lumpectomy 2019     COMPARISON: 12/19/2023, 12/15/2022 11/15/2021     FINDINGS: There are scattered areas of fibroglandular density.  There is 25-50% fibroglandular tissue.  This is category B.  There is no dominant mass, suspicious microcalcifications, or architectural distortion in either breast.  Postsurgical change   from conservation therapy in the left breast. BIOZORB  Biopsy clips are present.  Scar marker is present.     IMPRESSION:  BI-RADS 2 - Benign     RECOMMENDATION: Recommend patient return in 1 year for annual screening mammogram.        ______________________________________   Electronically signed by: OJ WHALEY M.D.      MRI 8/20/2018     Impression   1. Postbiopsy changes in the left breast are consistent with the   biopsy-proven invasive breast cancer.   2. No additional sites of suspicious enhancement are identified in   bilateral breasts.   3. No MRI evidence of lymphadenopathy       PATHOLOGY REVEALS:  FINAL DIAGNOSIS:       A. 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 (pT1b, pNX).  Tumor measures 0.9cm in greatest dimension.  Tumor is 0.2cm from closest inked margin (anterior).  Margins of excision are negative.    B. Left breast, additional margin cranial,

## 2024-12-23 ENCOUNTER — OFFICE VISIT (OUTPATIENT)
Dept: SURGERY | Age: 64
End: 2024-12-23

## 2024-12-23 ENCOUNTER — HOSPITAL ENCOUNTER (OUTPATIENT)
Dept: WOMENS IMAGING | Age: 64
Discharge: HOME | End: 2024-12-23
Payer: MEDICARE

## 2024-12-23 VITALS — HEIGHT: 62 IN | WEIGHT: 135 LBS | BODY MASS INDEX: 24.84 KG/M2

## 2024-12-23 VITALS — BODY MASS INDEX: 23.77 KG/M2 | WEIGHT: 130 LBS

## 2024-12-23 DIAGNOSIS — C50.812 MALIGNANT NEOPLASM OF OVERLAPPING SITES OF LEFT BREAST IN FEMALE, ESTROGEN RECEPTOR POSITIVE (HCC): ICD-10-CM

## 2024-12-23 DIAGNOSIS — Z98.890 STATUS POST LEFT BREAST LUMPECTOMY: Primary | ICD-10-CM

## 2024-12-23 DIAGNOSIS — Z12.31 VISIT FOR SCREENING MAMMOGRAM: ICD-10-CM

## 2024-12-23 DIAGNOSIS — Z17.0 MALIGNANT NEOPLASM OF OVERLAPPING SITES OF LEFT BREAST IN FEMALE, ESTROGEN RECEPTOR POSITIVE (HCC): ICD-10-CM

## 2024-12-23 PROCEDURE — 77063 BREAST TOMOSYNTHESIS BI: CPT

## 2025-01-07 ENCOUNTER — TELEPHONE (OUTPATIENT)
Dept: HEMATOLOGY | Age: 65
End: 2025-01-07

## 2025-01-07 NOTE — TELEPHONE ENCOUNTER

## 2025-01-08 DIAGNOSIS — Z17.0 MALIGNANT NEOPLASM OF OVERLAPPING SITES OF LEFT BREAST IN FEMALE, ESTROGEN RECEPTOR POSITIVE (HCC): Primary | ICD-10-CM

## 2025-01-08 DIAGNOSIS — Z79.899 MEDICATION MANAGEMENT: ICD-10-CM

## 2025-01-08 DIAGNOSIS — C50.812 MALIGNANT NEOPLASM OF OVERLAPPING SITES OF LEFT BREAST IN FEMALE, ESTROGEN RECEPTOR POSITIVE (HCC): Primary | ICD-10-CM

## 2025-01-08 NOTE — PROGRESS NOTES
attendance at the appointment consented to the use of AI, including the recording.      I, Dominguez Staples, am starting this note as a registered nurse for JAVAN Hill.

## 2025-01-09 ENCOUNTER — HOSPITAL ENCOUNTER (OUTPATIENT)
Dept: INFUSION THERAPY | Age: 65
Discharge: HOME | End: 2025-01-09
Payer: MEDICARE

## 2025-01-09 ENCOUNTER — OFFICE VISIT (OUTPATIENT)
Dept: HEMATOLOGY | Age: 65
End: 2025-01-09
Payer: MEDICARE

## 2025-01-09 VITALS
SYSTOLIC BLOOD PRESSURE: 128 MMHG | TEMPERATURE: 98.3 F | BODY MASS INDEX: 24.66 KG/M2 | WEIGHT: 134 LBS | DIASTOLIC BLOOD PRESSURE: 70 MMHG | OXYGEN SATURATION: 97 % | HEART RATE: 80 BPM | HEIGHT: 62 IN

## 2025-01-09 DIAGNOSIS — I10 ESSENTIAL HYPERTENSION: ICD-10-CM

## 2025-01-09 DIAGNOSIS — Z17.0 MALIGNANT NEOPLASM OF OVERLAPPING SITES OF LEFT BREAST IN FEMALE, ESTROGEN RECEPTOR POSITIVE (HCC): ICD-10-CM

## 2025-01-09 DIAGNOSIS — Z79.83 ENCOUNTER FOR MONITORING BISPHOSPHONATE THERAPY: ICD-10-CM

## 2025-01-09 DIAGNOSIS — E11.9 TYPE 2 DIABETES MELLITUS WITHOUT COMPLICATION, WITHOUT LONG-TERM CURRENT USE OF INSULIN (HCC): ICD-10-CM

## 2025-01-09 DIAGNOSIS — Z79.899 MEDICATION MANAGEMENT: ICD-10-CM

## 2025-01-09 DIAGNOSIS — M81.0 OSTEOPOROSIS, UNSPECIFIED OSTEOPOROSIS TYPE, UNSPECIFIED PATHOLOGICAL FRACTURE PRESENCE: Primary | ICD-10-CM

## 2025-01-09 DIAGNOSIS — Z17.0 MALIGNANT NEOPLASM OF OVERLAPPING SITES OF LEFT BREAST IN FEMALE, ESTROGEN RECEPTOR POSITIVE (HCC): Primary | ICD-10-CM

## 2025-01-09 DIAGNOSIS — E78.2 MIXED HYPERLIPIDEMIA: ICD-10-CM

## 2025-01-09 DIAGNOSIS — Z51.81 ENCOUNTER FOR MONITORING BISPHOSPHONATE THERAPY: ICD-10-CM

## 2025-01-09 DIAGNOSIS — C50.812 MALIGNANT NEOPLASM OF OVERLAPPING SITES OF LEFT BREAST IN FEMALE, ESTROGEN RECEPTOR POSITIVE (HCC): ICD-10-CM

## 2025-01-09 DIAGNOSIS — M85.852 OSTEOPENIA OF NECK OF LEFT FEMUR: ICD-10-CM

## 2025-01-09 DIAGNOSIS — Z79.810 ENCOUNTER FOR MONITORING TAMOXIFEN THERAPY: ICD-10-CM

## 2025-01-09 DIAGNOSIS — R00.0 TACHYCARDIA: ICD-10-CM

## 2025-01-09 DIAGNOSIS — C50.912 INVASIVE DUCTAL CARCINOMA OF BREAST, LEFT (HCC): ICD-10-CM

## 2025-01-09 DIAGNOSIS — C50.812 MALIGNANT NEOPLASM OF OVERLAPPING SITES OF LEFT BREAST IN FEMALE, ESTROGEN RECEPTOR POSITIVE (HCC): Primary | ICD-10-CM

## 2025-01-09 DIAGNOSIS — Z51.81 ENCOUNTER FOR MONITORING TAMOXIFEN THERAPY: ICD-10-CM

## 2025-01-09 LAB
ALBUMIN SERPL-MCNC: 4.1 G/DL (ref 3.5–5.2)
ALP SERPL-CCNC: 60 U/L (ref 35–104)
ALT SERPL-CCNC: 15 U/L (ref 5–33)
ANION GAP SERPL CALCULATED.3IONS-SCNC: 9 MMOL/L (ref 7–19)
AST SERPL-CCNC: 21 U/L (ref 5–32)
BASOPHILS # BLD: 0.04 K/UL (ref 0.01–0.08)
BASOPHILS NFR BLD: 0.5 % (ref 0.1–1.2)
BILIRUB SERPL-MCNC: 0.6 MG/DL (ref 0–1.2)
BUN SERPL-MCNC: 16 MG/DL (ref 8–23)
CALCIUM SERPL-MCNC: 10 MG/DL (ref 8.8–10.2)
CHLORIDE SERPL-SCNC: 103 MMOL/L (ref 98–107)
CO2 SERPL-SCNC: 29 MMOL/L (ref 22–29)
CREAT SERPL-MCNC: 0.7 MG/DL (ref 0.5–0.9)
EOSINOPHIL # BLD: 0.08 K/UL (ref 0.04–0.54)
EOSINOPHIL NFR BLD: 1 % (ref 0.7–7)
ERYTHROCYTE [DISTWIDTH] IN BLOOD BY AUTOMATED COUNT: 12.4 % (ref 11.7–14.4)
GLUCOSE SERPL-MCNC: 185 MG/DL (ref 70–99)
HCT VFR BLD AUTO: 39.2 % (ref 34.1–44.9)
HGB BLD-MCNC: 13.3 G/DL (ref 11.2–15.7)
LYMPHOCYTES # BLD: 1.93 K/UL (ref 1.18–3.74)
LYMPHOCYTES NFR BLD: 25.3 % (ref 19.3–53.1)
MAGNESIUM SERPL-MCNC: 1.8 MG/DL (ref 1.6–2.4)
MCH RBC QN AUTO: 31.7 PG (ref 25.6–32.2)
MCHC RBC AUTO-ENTMCNC: 33.9 G/DL (ref 32.3–35.5)
MCV RBC AUTO: 93.3 FL (ref 79.4–94.8)
MONOCYTES # BLD: 0.49 K/UL (ref 0.24–0.82)
MONOCYTES NFR BLD: 6.4 % (ref 4.7–12.5)
NEUTROPHILS # BLD: 5.07 K/UL (ref 1.56–6.13)
NEUTS SEG NFR BLD: 66.7 % (ref 34–71.1)
PHOSPHATE SERPL-MCNC: 2.6 MG/DL (ref 2.5–4.5)
PLATELET # BLD AUTO: 226 K/UL (ref 182–369)
PMV BLD AUTO: 9.6 FL (ref 7.4–10.4)
POTASSIUM SERPL-SCNC: 4.6 MMOL/L (ref 3.5–5.1)
PROT SERPL-MCNC: 6.7 G/DL (ref 6.4–8.3)
RBC # BLD AUTO: 4.2 M/UL (ref 3.93–5.22)
SODIUM SERPL-SCNC: 141 MMOL/L (ref 136–145)
WBC # BLD AUTO: 7.62 K/UL (ref 3.98–10.04)

## 2025-01-09 PROCEDURE — 80053 COMPREHEN METABOLIC PANEL: CPT

## 2025-01-09 PROCEDURE — 36415 COLL VENOUS BLD VENIPUNCTURE: CPT

## 2025-01-09 PROCEDURE — 85025 COMPLETE CBC W/AUTO DIFF WBC: CPT

## 2025-01-09 PROCEDURE — 96372 THER/PROPH/DIAG INJ SC/IM: CPT

## 2025-01-09 PROCEDURE — 84100 ASSAY OF PHOSPHORUS: CPT

## 2025-01-09 PROCEDURE — 83735 ASSAY OF MAGNESIUM: CPT

## 2025-01-09 PROCEDURE — 6360000002 HC RX W HCPCS: Performed by: NURSE PRACTITIONER

## 2025-01-09 RX ADMIN — DENOSUMAB 60 MG: 60 INJECTION SUBCUTANEOUS at 10:50

## 2025-01-09 ASSESSMENT — ENCOUNTER SYMPTOMS
EYES NEGATIVE: 1
NAUSEA: 0
WHEEZING: 0
EYE PAIN: 0
RESPIRATORY NEGATIVE: 1
SORE THROAT: 0
SHORTNESS OF BREATH: 0
COUGH: 0
BLOOD IN STOOL: 0
GASTROINTESTINAL NEGATIVE: 1
EYE REDNESS: 0
BACK PAIN: 0
ABDOMINAL PAIN: 0
DIARRHEA: 0
VOMITING: 0
CONSTIPATION: 0
EYE DISCHARGE: 0

## 2025-01-15 DIAGNOSIS — Z12.31 VISIT FOR SCREENING MAMMOGRAM: Primary | ICD-10-CM

## 2025-03-06 DIAGNOSIS — E11.9 TYPE 2 DIABETES MELLITUS WITHOUT COMPLICATION, WITHOUT LONG-TERM CURRENT USE OF INSULIN (HCC): ICD-10-CM

## 2025-03-06 RX ORDER — SEMAGLUTIDE 1.34 MG/ML
1 INJECTION, SOLUTION SUBCUTANEOUS WEEKLY
Qty: 3 ML | Refills: 2 | Status: SHIPPED | OUTPATIENT
Start: 2025-03-06

## 2025-03-06 NOTE — TELEPHONE ENCOUNTER
Mariola BERNADETTE Oneil called to request a refill on her medication.      Last office visit : 11/13/2024   Next office visit : 5/13/2025     Requested Prescriptions     Pending Prescriptions Disp Refills    OZEMPIC, 1 MG/DOSE, 4 MG/3ML SOPN sc injection 3 mL 5     Sig: Inject 1 mg into the skin once a week            Janelle Bowles MA

## 2025-04-21 DIAGNOSIS — E78.2 MIXED HYPERLIPIDEMIA: ICD-10-CM

## 2025-04-21 DIAGNOSIS — E11.9 TYPE 2 DIABETES MELLITUS WITHOUT COMPLICATION, WITHOUT LONG-TERM CURRENT USE OF INSULIN (HCC): ICD-10-CM

## 2025-04-21 RX ORDER — ATORVASTATIN CALCIUM 40 MG/1
40 TABLET, FILM COATED ORAL DAILY
Qty: 90 TABLET | Refills: 0 | Status: SHIPPED | OUTPATIENT
Start: 2025-04-21

## 2025-04-21 RX ORDER — METFORMIN HYDROCHLORIDE 500 MG/1
500 TABLET, EXTENDED RELEASE ORAL DAILY
Qty: 90 TABLET | Refills: 0 | Status: SHIPPED | OUTPATIENT
Start: 2025-04-21

## 2025-04-21 RX ORDER — MONTELUKAST SODIUM 10 MG/1
10 TABLET ORAL DAILY
Qty: 90 TABLET | Refills: 0 | Status: SHIPPED | OUTPATIENT
Start: 2025-04-21

## 2025-05-02 DIAGNOSIS — E11.9 TYPE 2 DIABETES MELLITUS WITHOUT COMPLICATION, WITHOUT LONG-TERM CURRENT USE OF INSULIN (HCC): ICD-10-CM

## 2025-05-02 RX ORDER — SEMAGLUTIDE 1.34 MG/ML
INJECTION, SOLUTION SUBCUTANEOUS
Qty: 9 ML | Refills: 1 | Status: SHIPPED | OUTPATIENT
Start: 2025-05-02

## 2025-05-13 ENCOUNTER — RESULTS FOLLOW-UP (OUTPATIENT)
Dept: PRIMARY CARE CLINIC | Age: 65
End: 2025-05-13

## 2025-05-13 ENCOUNTER — OFFICE VISIT (OUTPATIENT)
Dept: PRIMARY CARE CLINIC | Age: 65
End: 2025-05-13
Payer: MEDICARE

## 2025-05-13 VITALS
SYSTOLIC BLOOD PRESSURE: 134 MMHG | DIASTOLIC BLOOD PRESSURE: 62 MMHG | BODY MASS INDEX: 23.52 KG/M2 | OXYGEN SATURATION: 98 % | WEIGHT: 127.8 LBS | HEIGHT: 62 IN | TEMPERATURE: 97.5 F | HEART RATE: 82 BPM

## 2025-05-13 DIAGNOSIS — E78.2 MIXED HYPERLIPIDEMIA: ICD-10-CM

## 2025-05-13 DIAGNOSIS — I10 ESSENTIAL HYPERTENSION: ICD-10-CM

## 2025-05-13 DIAGNOSIS — Z17.0 MALIGNANT NEOPLASM OF OVERLAPPING SITES OF LEFT BREAST IN FEMALE, ESTROGEN RECEPTOR POSITIVE (HCC): ICD-10-CM

## 2025-05-13 DIAGNOSIS — E11.9 TYPE 2 DIABETES MELLITUS WITHOUT COMPLICATION, WITHOUT LONG-TERM CURRENT USE OF INSULIN (HCC): ICD-10-CM

## 2025-05-13 DIAGNOSIS — C50.812 MALIGNANT NEOPLASM OF OVERLAPPING SITES OF LEFT BREAST IN FEMALE, ESTROGEN RECEPTOR POSITIVE (HCC): ICD-10-CM

## 2025-05-13 DIAGNOSIS — C50.912 INVASIVE DUCTAL CARCINOMA OF BREAST, LEFT (HCC): ICD-10-CM

## 2025-05-13 DIAGNOSIS — E11.9 TYPE 2 DIABETES MELLITUS WITHOUT COMPLICATION, WITHOUT LONG-TERM CURRENT USE OF INSULIN (HCC): Primary | ICD-10-CM

## 2025-05-13 DIAGNOSIS — R00.0 TACHYCARDIA: ICD-10-CM

## 2025-05-13 DIAGNOSIS — M81.0 OSTEOPOROSIS, UNSPECIFIED OSTEOPOROSIS TYPE, UNSPECIFIED PATHOLOGICAL FRACTURE PRESENCE: ICD-10-CM

## 2025-05-13 LAB
25(OH)D3 SERPL-MCNC: 44.5 NG/ML
ALBUMIN SERPL-MCNC: 4.2 G/DL (ref 3.5–5.2)
ALP SERPL-CCNC: 55 U/L (ref 35–104)
ALT SERPL-CCNC: 12 U/L (ref 10–35)
ANION GAP SERPL CALCULATED.3IONS-SCNC: 11 MMOL/L (ref 8–16)
AST SERPL-CCNC: 17 U/L (ref 10–35)
BILIRUB SERPL-MCNC: 0.5 MG/DL (ref 0.2–1.2)
BUN SERPL-MCNC: 20 MG/DL (ref 8–23)
CALCIUM SERPL-MCNC: 10.4 MG/DL (ref 8.8–10.2)
CHLORIDE SERPL-SCNC: 103 MMOL/L (ref 98–107)
CHOLEST SERPL-MCNC: 119 MG/DL (ref 0–199)
CO2 SERPL-SCNC: 24 MMOL/L (ref 22–29)
CREAT SERPL-MCNC: 0.6 MG/DL (ref 0.5–0.9)
GLUCOSE SERPL-MCNC: 115 MG/DL (ref 70–99)
HBA1C MFR BLD: 6.2 % (ref 4–5.6)
HDLC SERPL-MCNC: 52 MG/DL (ref 40–60)
LDLC SERPL CALC-MCNC: 42 MG/DL
POTASSIUM SERPL-SCNC: 4.3 MMOL/L (ref 3.5–5.1)
PROT SERPL-MCNC: 6.7 G/DL (ref 6.4–8.3)
SODIUM SERPL-SCNC: 138 MMOL/L (ref 136–145)
TRIGL SERPL-MCNC: 126 MG/DL (ref 0–149)
TSH SERPL DL<=0.005 MIU/L-ACNC: 0.61 UIU/ML (ref 0.27–4.2)

## 2025-05-13 PROCEDURE — 1123F ACP DISCUSS/DSCN MKR DOCD: CPT | Performed by: NURSE PRACTITIONER

## 2025-05-13 PROCEDURE — 3075F SYST BP GE 130 - 139MM HG: CPT | Performed by: NURSE PRACTITIONER

## 2025-05-13 PROCEDURE — 1036F TOBACCO NON-USER: CPT | Performed by: NURSE PRACTITIONER

## 2025-05-13 PROCEDURE — G8427 DOCREV CUR MEDS BY ELIG CLIN: HCPCS | Performed by: NURSE PRACTITIONER

## 2025-05-13 PROCEDURE — 3017F COLORECTAL CA SCREEN DOC REV: CPT | Performed by: NURSE PRACTITIONER

## 2025-05-13 PROCEDURE — G8420 CALC BMI NORM PARAMETERS: HCPCS | Performed by: NURSE PRACTITIONER

## 2025-05-13 PROCEDURE — G8399 PT W/DXA RESULTS DOCUMENT: HCPCS | Performed by: NURSE PRACTITIONER

## 2025-05-13 PROCEDURE — 1090F PRES/ABSN URINE INCON ASSESS: CPT | Performed by: NURSE PRACTITIONER

## 2025-05-13 PROCEDURE — 3078F DIAST BP <80 MM HG: CPT | Performed by: NURSE PRACTITIONER

## 2025-05-13 PROCEDURE — 99214 OFFICE O/P EST MOD 30 MIN: CPT | Performed by: NURSE PRACTITIONER

## 2025-05-13 PROCEDURE — 1159F MED LIST DOCD IN RCRD: CPT | Performed by: NURSE PRACTITIONER

## 2025-05-13 PROCEDURE — 3044F HG A1C LEVEL LT 7.0%: CPT | Performed by: NURSE PRACTITIONER

## 2025-05-13 PROCEDURE — 1160F RVW MEDS BY RX/DR IN RCRD: CPT | Performed by: NURSE PRACTITIONER

## 2025-05-13 PROCEDURE — 2022F DILAT RTA XM EVC RTNOPTHY: CPT | Performed by: NURSE PRACTITIONER

## 2025-05-13 SDOH — ECONOMIC STABILITY: FOOD INSECURITY: WITHIN THE PAST 12 MONTHS, YOU WORRIED THAT YOUR FOOD WOULD RUN OUT BEFORE YOU GOT MONEY TO BUY MORE.: NEVER TRUE

## 2025-05-13 SDOH — ECONOMIC STABILITY: FOOD INSECURITY: WITHIN THE PAST 12 MONTHS, THE FOOD YOU BOUGHT JUST DIDN'T LAST AND YOU DIDN'T HAVE MONEY TO GET MORE.: NEVER TRUE

## 2025-05-13 ASSESSMENT — PATIENT HEALTH QUESTIONNAIRE - PHQ9
SUM OF ALL RESPONSES TO PHQ QUESTIONS 1-9: 0
SUM OF ALL RESPONSES TO PHQ QUESTIONS 1-9: 0
2. FEELING DOWN, DEPRESSED OR HOPELESS: NOT AT ALL
SUM OF ALL RESPONSES TO PHQ QUESTIONS 1-9: 0
1. LITTLE INTEREST OR PLEASURE IN DOING THINGS: NOT AT ALL
SUM OF ALL RESPONSES TO PHQ QUESTIONS 1-9: 0

## 2025-05-13 ASSESSMENT — ENCOUNTER SYMPTOMS
VOMITING: 0
COUGH: 0
SHORTNESS OF BREATH: 0
VOICE CHANGE: 0
RHINORRHEA: 0
BACK PAIN: 0
PHOTOPHOBIA: 0
COLOR CHANGE: 0
NAUSEA: 0

## 2025-05-13 NOTE — PROGRESS NOTES
[facility]  [address]  [address]  [place] [address]  Dept: [phone]  Dept Fax: [phone]  Loc: [phone]    Mariola Oneil is a 65 y.o. female who presents today for her medical conditions/complaints as noted below.  Mariola Oneil is c/o of 6 Month Follow-Up        HPI:     History of Present Illness  The patient presents for follow-up of breast cancer, diabetes mellitus, and weight loss.    She reports a satisfactory health status, with her blood glucose levels being well-regulated. Her blood sugar was 115 this morning. She has not yet completed her laboratory tests. She is currently on Ozempic.    She has experienced a weight loss of approximately 7 pounds since her last visit, which she attributes to a decreased appetite. She maintains a daily intake of protein through morning smoothies.    She had a follow-up with Barbara Yeh for oncology in 01/2025 and is still taking tamoxifen for breast cancer. She had a mammogram done in 12/2024, which showed scar tissue. She is interested in switching to Dr. Suellen Marie for her breast care. She had an unscheduled meeting with FLAQUITA Mesa because she felt something in her breast. He did an ultrasound and said it was scar tissue.     She has been experiencing episodes of tachycardia while at rest, which last for a few seconds. These episodes have occurred only a couple of times recently. She does not consume caffeine or use inhalers. She reports no feelings of anxiety or nervousness.       Past Medical History:   Diagnosis Date    Abdominal hernia     Breast cancer (HCC)     Cancer (HCC)     breast cancer    Carotid artery plaque     9/16 <50% rt;  50-69% left    Essential hypertension     H/O abnormal cervical Papanicolaou smear     Mixed hyperlipidemia     Osteopenia     Simple goiter     Type 2 diabetes mellitus without complication (HCC)     Umbilical hernia       Past Surgical History:   Procedure

## 2025-07-02 ENCOUNTER — OFFICE VISIT (OUTPATIENT)
Age: 65
End: 2025-07-02

## 2025-07-02 VITALS
HEART RATE: 77 BPM | SYSTOLIC BLOOD PRESSURE: 122 MMHG | WEIGHT: 127 LBS | OXYGEN SATURATION: 98 % | DIASTOLIC BLOOD PRESSURE: 64 MMHG | TEMPERATURE: 98.2 F | BODY MASS INDEX: 23.23 KG/M2 | RESPIRATION RATE: 18 BRPM

## 2025-07-02 DIAGNOSIS — L23.7 POISON IVY DERMATITIS: Primary | ICD-10-CM

## 2025-07-02 RX ORDER — TRIAMCINOLONE ACETONIDE 1 MG/G
CREAM TOPICAL
Qty: 15 G | Refills: 0 | Status: SHIPPED | OUTPATIENT
Start: 2025-07-02

## 2025-07-02 RX ORDER — DEXAMETHASONE SODIUM PHOSPHATE 10 MG/ML
10 INJECTION, SOLUTION INTRA-ARTICULAR; INTRALESIONAL; INTRAMUSCULAR; INTRAVENOUS; SOFT TISSUE ONCE
Status: COMPLETED | OUTPATIENT
Start: 2025-07-02 | End: 2025-07-02

## 2025-07-02 RX ADMIN — DEXAMETHASONE SODIUM PHOSPHATE 10 MG: 10 INJECTION, SOLUTION INTRA-ARTICULAR; INTRALESIONAL; INTRAMUSCULAR; INTRAVENOUS; SOFT TISSUE at 14:09

## 2025-07-02 ASSESSMENT — ENCOUNTER SYMPTOMS
COLOR CHANGE: 0
APNEA: 0
EYE DISCHARGE: 0
SINUS PAIN: 0
ABDOMINAL DISTENTION: 0
RHINORRHEA: 0
NAUSEA: 0
EYE ITCHING: 0
EYE PAIN: 0
SINUS PRESSURE: 0
TROUBLE SWALLOWING: 0
WHEEZING: 0
CHEST TIGHTNESS: 0
ABDOMINAL PAIN: 0
SORE THROAT: 0
SHORTNESS OF BREATH: 0
DIARRHEA: 0
VOMITING: 0
CONSTIPATION: 0
COUGH: 0

## 2025-07-02 NOTE — PROGRESS NOTES
symptoms do not improve.     Electronically signed by JAVAN Engle CNP on 7/2/2025 at 2:09 PM    EMR Dragon/translation disclaimer: Much of this encounter note is an electronic transcription/translation of spoken language to printed text. The electronic translation of spoken language may be erroneous, or at times, nonsensical words or phrases may be inadvertently transcribed.  Although I have reviewed the note for such errors, some may still exist.

## 2025-07-02 NOTE — PATIENT INSTRUCTIONS
Steroid injection given in office today.   Apply steroid cream as prescribed.   May use over-the-counter calamine lotion and/or Benadryl cream for itch relief.   May take Benadryl at night to aid with itching.  Take an over-the-counter antihistamine every morning, such as Zyrtec.   Avoid scratching the rash. May use cool compresses.   Follow up with primary care provider if symptoms do not improve.

## 2025-07-03 ENCOUNTER — TELEPHONE (OUTPATIENT)
Dept: HEMATOLOGY | Age: 65
End: 2025-07-03

## 2025-07-03 NOTE — TELEPHONE ENCOUNTER
Called Patient and reminded patient of their appointment on 07/09/2025 and patient confirmed they would be here. Reminded patient to just come at appointment time, and to not come at the lab appointment time.  We have now moved to the [facility] [place] that is located between our old office and the ER at [facility] [place]. Letting the Pt know that our front entrance faces the  [employer] [place]. Reminded pt to eat well and be well hydrated for their labs

## 2025-07-08 DIAGNOSIS — Z79.899 MEDICATION MANAGEMENT: ICD-10-CM

## 2025-07-08 DIAGNOSIS — C50.812 MALIGNANT NEOPLASM OF OVERLAPPING SITES OF LEFT BREAST IN FEMALE, ESTROGEN RECEPTOR POSITIVE (HCC): Primary | ICD-10-CM

## 2025-07-08 DIAGNOSIS — Z17.0 MALIGNANT NEOPLASM OF OVERLAPPING SITES OF LEFT BREAST IN FEMALE, ESTROGEN RECEPTOR POSITIVE (HCC): Primary | ICD-10-CM

## 2025-07-08 NOTE — PROGRESS NOTES
Progress Note      Pt Name: Mariola Oneil  YOB: 1960  MRN: [id number]    Date of evaluation: 07/09/2025    History Obtained From:  patient, electronic medical record    CHIEF COMPLAINT:    Chief Complaint   Patient presents with    Breast Cancer    Treatment     Prolia     HISTORY OF PRESENT ILLNESS:    Mariola Oneil is a 65 y.o.  female who is being followed for invasive low-grade ductal carcinoma of the left breast, ER 99%, RI 21%, HER-2/nancy 1+ by IHC, diagnosed 8/1/2018 and osteoporosis.  She is status post left lumpectomy and IORT on 9/28/2018. Current recommendation is for endocrine therapy with tamoxifen 20 mg daily for 10 years through August 2028 and Prolia 60 mg subcu every 6 months for her history osteoporosis.  She has had no mammographic imaging to suggest progression of disease.  Sweta returns today in scheduled follow-up for evaluation, lab monitoring, side effect monitoring, consideration for Prolia injection and further treatment recommendations.   History of Present Illness  She reports no new health issues since her last visit. She has been tolerating tamoxifen well, with the exception of occasional warmth sensations, which are not severe enough to disrupt her sleep or quality of life. She reports no vaginal dryness. Her mammogram is scheduled for 01/13/2026. She recalls a previous instance where she discovered a lump during a self-exam, which was later identified as scar tissue from her surgery. This occurred earlier this year. She has sufficient refills of tamoxifen. She maintains an active lifestyle, including regular gym workouts and swimming. She also takes calcium and vitamin D supplements. Her triglycerides were checked in 05/2024 and were within normal limits. She is currently on Prolia for osteopenia with a history of osteoporosis,  bone density improvement noted since 2022.     Today's clinic visit to include physical assessment, review of systems, any lab or

## 2025-07-09 ENCOUNTER — OFFICE VISIT (OUTPATIENT)
Dept: HEMATOLOGY | Age: 65
End: 2025-07-09
Payer: MEDICARE

## 2025-07-09 ENCOUNTER — HOSPITAL ENCOUNTER (OUTPATIENT)
Dept: INFUSION THERAPY | Age: 65
Discharge: HOME | End: 2025-07-09
Payer: MEDICARE

## 2025-07-09 VITALS
WEIGHT: 125.2 LBS | RESPIRATION RATE: 16 BRPM | SYSTOLIC BLOOD PRESSURE: 123 MMHG | HEART RATE: 76 BPM | TEMPERATURE: 98.6 F | BODY MASS INDEX: 22.9 KG/M2 | DIASTOLIC BLOOD PRESSURE: 73 MMHG | OXYGEN SATURATION: 100 %

## 2025-07-09 DIAGNOSIS — C50.812 MALIGNANT NEOPLASM OF OVERLAPPING SITES OF LEFT BREAST IN FEMALE, ESTROGEN RECEPTOR POSITIVE (HCC): ICD-10-CM

## 2025-07-09 DIAGNOSIS — Z79.810 ENCOUNTER FOR MONITORING TAMOXIFEN THERAPY: ICD-10-CM

## 2025-07-09 DIAGNOSIS — M85.852 OSTEOPENIA OF NECK OF LEFT FEMUR: ICD-10-CM

## 2025-07-09 DIAGNOSIS — Z79.83 ENCOUNTER FOR MONITORING BISPHOSPHONATE THERAPY: ICD-10-CM

## 2025-07-09 DIAGNOSIS — C50.812 MALIGNANT NEOPLASM OF OVERLAPPING SITES OF LEFT BREAST IN FEMALE, ESTROGEN RECEPTOR POSITIVE (HCC): Primary | ICD-10-CM

## 2025-07-09 DIAGNOSIS — Z78.0 POSTMENOPAUSAL STATE: Primary | ICD-10-CM

## 2025-07-09 DIAGNOSIS — Z51.81 ENCOUNTER FOR MONITORING BISPHOSPHONATE THERAPY: ICD-10-CM

## 2025-07-09 DIAGNOSIS — Z79.899 MEDICATION MANAGEMENT: ICD-10-CM

## 2025-07-09 DIAGNOSIS — Z17.0 MALIGNANT NEOPLASM OF OVERLAPPING SITES OF LEFT BREAST IN FEMALE, ESTROGEN RECEPTOR POSITIVE (HCC): ICD-10-CM

## 2025-07-09 DIAGNOSIS — Z51.81 ENCOUNTER FOR MONITORING TAMOXIFEN THERAPY: ICD-10-CM

## 2025-07-09 DIAGNOSIS — M81.0 OSTEOPOROSIS, UNSPECIFIED OSTEOPOROSIS TYPE, UNSPECIFIED PATHOLOGICAL FRACTURE PRESENCE: ICD-10-CM

## 2025-07-09 DIAGNOSIS — Z17.0 MALIGNANT NEOPLASM OF OVERLAPPING SITES OF LEFT BREAST IN FEMALE, ESTROGEN RECEPTOR POSITIVE (HCC): Primary | ICD-10-CM

## 2025-07-09 LAB
ALBUMIN SERPL-MCNC: 4.2 G/DL (ref 3.5–5.2)
ALP SERPL-CCNC: 62 U/L (ref 35–104)
ALT SERPL-CCNC: 21 U/L (ref 5–33)
ANION GAP SERPL CALCULATED.3IONS-SCNC: 10 MMOL/L (ref 7–19)
AST SERPL-CCNC: 23 U/L (ref 5–32)
BASOPHILS # BLD: 0.04 K/UL (ref 0–0.2)
BASOPHILS NFR BLD: 0.5 % (ref 0–1)
BILIRUB SERPL-MCNC: 0.6 MG/DL (ref 0–1.2)
BUN SERPL-MCNC: 18 MG/DL (ref 8–23)
CALCIUM SERPL-MCNC: 10.3 MG/DL (ref 8.8–10.2)
CHLORIDE SERPL-SCNC: 102 MMOL/L (ref 98–107)
CO2 SERPL-SCNC: 28 MMOL/L (ref 22–29)
CREAT SERPL-MCNC: 0.6 MG/DL (ref 0.5–0.9)
EOSINOPHIL # BLD: 0.16 K/UL (ref 0–0.6)
EOSINOPHIL NFR BLD: 2.1 % (ref 0–5)
ERYTHROCYTE [DISTWIDTH] IN BLOOD BY AUTOMATED COUNT: 12.2 % (ref 11.5–14.5)
GLUCOSE SERPL-MCNC: 117 MG/DL (ref 70–99)
HCT VFR BLD AUTO: 40.2 % (ref 37–47)
HGB BLD-MCNC: 13.7 G/DL (ref 12–16)
LYMPHOCYTES # BLD: 2.29 K/UL (ref 1.1–4.5)
LYMPHOCYTES NFR BLD: 29.6 % (ref 20–40)
MAGNESIUM SERPL-MCNC: 1.6 MG/DL (ref 1.6–2.4)
MCH RBC QN AUTO: 32.3 PG (ref 27–31)
MCHC RBC AUTO-ENTMCNC: 34.1 G/DL (ref 33–37)
MCV RBC AUTO: 94.8 FL (ref 81–99)
MONOCYTES # BLD: 0.65 K/UL (ref 0–0.9)
MONOCYTES NFR BLD: 8.4 % (ref 1–10)
NEUTROPHILS # BLD: 4.58 K/UL (ref 1.5–7.5)
NEUTS SEG NFR BLD: 59.1 % (ref 50–65)
PHOSPHATE SERPL-MCNC: 2.6 MG/DL (ref 2.5–4.5)
PLATELET # BLD AUTO: 229 K/UL (ref 130–400)
PMV BLD AUTO: 9.9 FL (ref 9.4–12.3)
POTASSIUM SERPL-SCNC: 4.2 MMOL/L (ref 3.5–5.1)
PROT SERPL-MCNC: 6.9 G/DL (ref 6.4–8.3)
RBC # BLD AUTO: 4.24 M/UL (ref 4.2–5.4)
SODIUM SERPL-SCNC: 140 MMOL/L (ref 136–145)
WBC # BLD AUTO: 7.74 K/UL (ref 4.8–10.8)

## 2025-07-09 PROCEDURE — 1123F ACP DISCUSS/DSCN MKR DOCD: CPT | Performed by: NURSE PRACTITIONER

## 2025-07-09 PROCEDURE — 99213 OFFICE O/P EST LOW 20 MIN: CPT

## 2025-07-09 PROCEDURE — 96372 THER/PROPH/DIAG INJ SC/IM: CPT

## 2025-07-09 PROCEDURE — G8399 PT W/DXA RESULTS DOCUMENT: HCPCS | Performed by: NURSE PRACTITIONER

## 2025-07-09 PROCEDURE — 1159F MED LIST DOCD IN RCRD: CPT | Performed by: NURSE PRACTITIONER

## 2025-07-09 PROCEDURE — 80053 COMPREHEN METABOLIC PANEL: CPT

## 2025-07-09 PROCEDURE — G2211 COMPLEX E/M VISIT ADD ON: HCPCS | Performed by: NURSE PRACTITIONER

## 2025-07-09 PROCEDURE — 1036F TOBACCO NON-USER: CPT | Performed by: NURSE PRACTITIONER

## 2025-07-09 PROCEDURE — 84100 ASSAY OF PHOSPHORUS: CPT

## 2025-07-09 PROCEDURE — G8420 CALC BMI NORM PARAMETERS: HCPCS | Performed by: NURSE PRACTITIONER

## 2025-07-09 PROCEDURE — G8428 CUR MEDS NOT DOCUMENT: HCPCS | Performed by: NURSE PRACTITIONER

## 2025-07-09 PROCEDURE — 36415 COLL VENOUS BLD VENIPUNCTURE: CPT

## 2025-07-09 PROCEDURE — 6360000002 HC RX W HCPCS: Performed by: INTERNAL MEDICINE

## 2025-07-09 PROCEDURE — 1090F PRES/ABSN URINE INCON ASSESS: CPT | Performed by: NURSE PRACTITIONER

## 2025-07-09 PROCEDURE — 83735 ASSAY OF MAGNESIUM: CPT

## 2025-07-09 PROCEDURE — 99214 OFFICE O/P EST MOD 30 MIN: CPT | Performed by: NURSE PRACTITIONER

## 2025-07-09 PROCEDURE — 85025 COMPLETE CBC W/AUTO DIFF WBC: CPT

## 2025-07-09 PROCEDURE — 3017F COLORECTAL CA SCREEN DOC REV: CPT | Performed by: NURSE PRACTITIONER

## 2025-07-09 RX ORDER — HYDROCORTISONE SODIUM SUCCINATE 100 MG/2ML
100 INJECTION INTRAMUSCULAR; INTRAVENOUS
OUTPATIENT
Start: 2025-07-09

## 2025-07-09 RX ORDER — SODIUM CHLORIDE 9 MG/ML
INJECTION, SOLUTION INTRAVENOUS CONTINUOUS
OUTPATIENT
Start: 2026-01-04

## 2025-07-09 RX ORDER — FAMOTIDINE 10 MG/ML
20 INJECTION, SOLUTION INTRAVENOUS
OUTPATIENT
Start: 2025-07-09

## 2025-07-09 RX ORDER — ALBUTEROL SULFATE 90 UG/1
4 INHALANT RESPIRATORY (INHALATION) PRN
OUTPATIENT
Start: 2025-07-09

## 2025-07-09 RX ORDER — SODIUM CHLORIDE 9 MG/ML
INJECTION, SOLUTION INTRAVENOUS CONTINUOUS
OUTPATIENT
Start: 2025-07-09

## 2025-07-09 RX ORDER — DIPHENHYDRAMINE HYDROCHLORIDE 50 MG/ML
50 INJECTION, SOLUTION INTRAMUSCULAR; INTRAVENOUS
OUTPATIENT
Start: 2026-01-04

## 2025-07-09 RX ORDER — HYDROCORTISONE SODIUM SUCCINATE 100 MG/2ML
100 INJECTION INTRAMUSCULAR; INTRAVENOUS
OUTPATIENT
Start: 2026-01-04

## 2025-07-09 RX ORDER — DIPHENHYDRAMINE HYDROCHLORIDE 50 MG/ML
50 INJECTION, SOLUTION INTRAMUSCULAR; INTRAVENOUS
OUTPATIENT
Start: 2025-07-09

## 2025-07-09 RX ORDER — FAMOTIDINE 10 MG/ML
20 INJECTION, SOLUTION INTRAVENOUS
OUTPATIENT
Start: 2026-01-04

## 2025-07-09 RX ORDER — ONDANSETRON 2 MG/ML
8 INJECTION INTRAMUSCULAR; INTRAVENOUS
OUTPATIENT
Start: 2025-07-09

## 2025-07-09 RX ORDER — ACETAMINOPHEN 325 MG/1
650 TABLET ORAL
OUTPATIENT
Start: 2026-01-04

## 2025-07-09 RX ORDER — ACETAMINOPHEN 325 MG/1
650 TABLET ORAL
OUTPATIENT
Start: 2025-07-09

## 2025-07-09 RX ORDER — EPINEPHRINE 1 MG/ML
0.3 INJECTION, SOLUTION, CONCENTRATE INTRAVENOUS PRN
OUTPATIENT
Start: 2026-01-04

## 2025-07-09 RX ORDER — ALBUTEROL SULFATE 90 UG/1
4 INHALANT RESPIRATORY (INHALATION) PRN
OUTPATIENT
Start: 2026-01-04

## 2025-07-09 RX ORDER — EPINEPHRINE 1 MG/ML
0.3 INJECTION, SOLUTION, CONCENTRATE INTRAVENOUS PRN
OUTPATIENT
Start: 2025-07-09

## 2025-07-09 RX ORDER — ONDANSETRON 2 MG/ML
8 INJECTION INTRAMUSCULAR; INTRAVENOUS
OUTPATIENT
Start: 2026-01-04

## 2025-07-09 RX ADMIN — DENOSUMAB 60 MG: 60 INJECTION SUBCUTANEOUS at 11:06

## 2025-07-12 ASSESSMENT — ENCOUNTER SYMPTOMS
EYE REDNESS: 0
EYES NEGATIVE: 1
NAUSEA: 0
ABDOMINAL PAIN: 0
EYE DISCHARGE: 0
BLOOD IN STOOL: 0
CONSTIPATION: 0
SHORTNESS OF BREATH: 0
DIARRHEA: 0
EYE PAIN: 0
COUGH: 0
BACK PAIN: 0
GASTROINTESTINAL NEGATIVE: 1
SORE THROAT: 0
VOMITING: 0
RESPIRATORY NEGATIVE: 1
WHEEZING: 0

## 2025-08-03 DIAGNOSIS — E11.9 TYPE 2 DIABETES MELLITUS WITHOUT COMPLICATION, WITHOUT LONG-TERM CURRENT USE OF INSULIN (HCC): ICD-10-CM

## 2025-08-03 DIAGNOSIS — E78.2 MIXED HYPERLIPIDEMIA: ICD-10-CM

## 2025-08-04 RX ORDER — MONTELUKAST SODIUM 10 MG/1
10 TABLET ORAL DAILY
Qty: 90 TABLET | Refills: 3 | Status: SHIPPED | OUTPATIENT
Start: 2025-08-04

## 2025-08-04 RX ORDER — METFORMIN HYDROCHLORIDE 500 MG/1
500 TABLET, EXTENDED RELEASE ORAL DAILY
Qty: 90 TABLET | Refills: 3 | Status: SHIPPED | OUTPATIENT
Start: 2025-08-04

## 2025-08-04 RX ORDER — ATORVASTATIN CALCIUM 40 MG/1
40 TABLET, FILM COATED ORAL DAILY
Qty: 90 TABLET | Refills: 3 | Status: SHIPPED | OUTPATIENT
Start: 2025-08-04

## 2025-08-15 ENCOUNTER — OFFICE VISIT (OUTPATIENT)
Age: 65
End: 2025-08-15

## 2025-08-15 VITALS
BODY MASS INDEX: 22.86 KG/M2 | TEMPERATURE: 97.9 F | OXYGEN SATURATION: 97 % | WEIGHT: 125 LBS | SYSTOLIC BLOOD PRESSURE: 128 MMHG | HEART RATE: 67 BPM | DIASTOLIC BLOOD PRESSURE: 70 MMHG

## 2025-08-15 DIAGNOSIS — L25.9 CONTACT DERMATITIS, UNSPECIFIED CONTACT DERMATITIS TYPE, UNSPECIFIED TRIGGER: Primary | ICD-10-CM

## 2025-08-15 RX ORDER — DEXAMETHASONE SODIUM PHOSPHATE 10 MG/ML
10 INJECTION, SOLUTION INTRA-ARTICULAR; INTRALESIONAL; INTRAMUSCULAR; INTRAVENOUS; SOFT TISSUE ONCE
Status: COMPLETED | OUTPATIENT
Start: 2025-08-15 | End: 2025-08-15

## 2025-08-15 RX ORDER — METHYLPREDNISOLONE 4 MG/1
TABLET ORAL
Qty: 1 KIT | Refills: 0 | Status: SHIPPED | OUTPATIENT
Start: 2025-08-15 | End: 2025-08-21

## 2025-08-15 RX ADMIN — DEXAMETHASONE SODIUM PHOSPHATE 10 MG: 10 INJECTION, SOLUTION INTRA-ARTICULAR; INTRALESIONAL; INTRAMUSCULAR; INTRAVENOUS; SOFT TISSUE at 07:27

## 2025-08-15 ASSESSMENT — ENCOUNTER SYMPTOMS
SORE THROAT: 0
STRIDOR: 0
TROUBLE SWALLOWING: 0
EYE PAIN: 0
SHORTNESS OF BREATH: 0
WHEEZING: 0
CHEST TIGHTNESS: 0
ABDOMINAL PAIN: 0
SINUS PRESSURE: 0
EYE DISCHARGE: 0
ABDOMINAL DISTENTION: 0
COLOR CHANGE: 0
COUGH: 0

## (undated) DEVICE — PROBE DTECT MARGIN F/LUMPECTOMY

## (undated) DEVICE — SYSTEM SKIN CLSR 22CM DERMBND PRINEO

## (undated) DEVICE — 3M™ IOBAN™ 2 ANTIMICROBIAL INCISE DRAPE 6650EZ: Brand: IOBAN™ 2

## (undated) DEVICE — 3M™ TEGADERM™ TRANSPARENT FILM DRESSING FRAME STYLE, 1626W, 4 IN X 4-3/4 IN (10 CM X 12 CM), 50/CT 4CT/CASE: Brand: 3M™ TEGADERM™

## (undated) DEVICE — ASTOUND STANDARD SURGICAL GOWN, XXL: Brand: CONVERTORS

## (undated) DEVICE — THREE QUARTER SHEET: Brand: CONVERTORS

## (undated) DEVICE — GAUZE,SPONGE,FLUFF,6"X6.75",STRL,10/TRAY: Brand: MEDLINE

## (undated) DEVICE — GLOVE SURG SZ 85 L12IN FNGR ORTHO 126MIL CRM LTX FREE

## (undated) DEVICE — GOWN,PREVENTION PLUS,XL,ST,24/CS: Brand: MEDLINE

## (undated) DEVICE — DRAIN JACKSON PRATT ROUND 15FR: Brand: CARDINAL HEALTH

## (undated) DEVICE — SPECIMEN ORIENTATION CHARMS, SIX DISTINCTLY SHAPED STERILE 10MM CHARMS: Brand: MARGINMAP

## (undated) DEVICE — SUTURE VCRL SZ 2-0 L36IN ABSRB UD L36MM CT-1 1/2 CIR J945H

## (undated) DEVICE — GLOVE SURG SZ 75 CRM LTX FREE POLYISOPRENE POLYMER BEAD ANTI

## (undated) DEVICE — SUTURE PROL SZ 2-0 L30IN NONABSORBABLE BLU L26MM CT-2 1/2 8411H

## (undated) DEVICE — 3 ML SYRINGE WITH HYPODERMIC SAFETY NEEDLE: Brand: MAGELLAN

## (undated) DEVICE — KIT APPLICATOR BALLOON POUCH ST 3-4 CM
Type: IMPLANTABLE DEVICE | Site: BREAST | Status: NON-FUNCTIONAL
Removed: 2018-09-28

## (undated) DEVICE — SOLUTION IV IRRIG POUR BRL 0.9% SODIUM CHL 2F7124

## (undated) DEVICE — ADHESIVE SKIN CLSR 0.7ML TOP DERMBND ADV

## (undated) DEVICE — CATHETER URETH 24FR 30CC BLLN SILAS F 2 W SPEC M RND TIP

## (undated) DEVICE — SUTURE PERMAHAND SZ 2-0 L18IN NONABSORBABLE BLK L26MM SH C012D

## (undated) DEVICE — CLIP INT M L GRN TI TRNSVRS GRV CHEVRON SHP W/ PRECIS TIP

## (undated) DEVICE — SUTURE ETHLN SZ 2-0 L30IN NONABSORBABLE BLK L36MM FSLX 3/8 1674H

## (undated) DEVICE — PACK,UNIVERSAL,NO GOWNS: Brand: MEDLINE

## (undated) DEVICE — SUTURE VCRL SZ 3-0 L36IN ABSRB UD L36MM CT-1 1/2 CIR J944H

## (undated) DEVICE — SOLUTION IV IRRIG WATER 1000ML POUR BRL 2F7114

## (undated) DEVICE — BULB SYRINGE, IRRIGATION WITH PROTECTIVE CAP, 60 CC, INDIVIDUALLY WRAPPED: Brand: DOVER

## (undated) DEVICE — MINOR CDS: Brand: MEDLINE INDUSTRIES, INC.

## (undated) DEVICE — PITCHER PT 1200ML W HNDL CSR WRP

## (undated) DEVICE — STANDARD HYPODERMIC NEEDLE,POLYPROPYLENE HUB: Brand: MONOJECT

## (undated) DEVICE — DRESSING GRMCDL 6 12FR D1N CNTR HOLE 4MM ANTMCRBL PRTCTVE DI

## (undated) DEVICE — SUTURE MNCRYL STRATAFIX PS 4-0 30CM

## (undated) DEVICE — COVER US PRB W5XL96IN LTX W/ GEL

## (undated) DEVICE — JACKSON-PRATT 100CC BULB RESERVOIR: Brand: CARDINAL HEALTH

## (undated) DEVICE — GOWN,PREVENTION PLUS,2XL,ST,22/CS: Brand: MEDLINE

## (undated) DEVICE — SUTURE VCRL SZ 3-0 L27IN ABSRB UD L26MM SH 1/2 CIR J416H

## (undated) DEVICE — PEN: MARKING STD 100/CS: Brand: MEDICAL ACTION INDUSTRIES